# Patient Record
Sex: FEMALE | Race: WHITE | NOT HISPANIC OR LATINO | Employment: STUDENT | URBAN - METROPOLITAN AREA
[De-identification: names, ages, dates, MRNs, and addresses within clinical notes are randomized per-mention and may not be internally consistent; named-entity substitution may affect disease eponyms.]

---
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
## 2021-02-11 ENCOUNTER — TELEPHONE (OUTPATIENT)
Dept: FAMILY MEDICINE CLINIC | Facility: CLINIC | Age: 21
End: 2021-02-11

## 2021-02-12 ENCOUNTER — OFFICE VISIT (OUTPATIENT)
Dept: URGENT CARE | Age: 21
End: 2021-02-12
Payer: COMMERCIAL

## 2021-02-12 VITALS — OXYGEN SATURATION: 100 % | TEMPERATURE: 98.6 F | RESPIRATION RATE: 18 BRPM | HEART RATE: 76 BPM

## 2021-02-12 DIAGNOSIS — J02.9 SORE THROAT: ICD-10-CM

## 2021-02-12 DIAGNOSIS — B34.9 VIRAL SYNDROME: Primary | ICD-10-CM

## 2021-02-12 LAB — S PYO AG THROAT QL: NEGATIVE

## 2021-02-12 PROCEDURE — G0382 LEV 3 HOSP TYPE B ED VISIT: HCPCS | Performed by: PHYSICIAN ASSISTANT

## 2021-02-12 PROCEDURE — 87070 CULTURE OTHR SPECIMN AEROBIC: CPT | Performed by: PHYSICIAN ASSISTANT

## 2021-02-12 PROCEDURE — 87147 CULTURE TYPE IMMUNOLOGIC: CPT | Performed by: PHYSICIAN ASSISTANT

## 2021-02-12 PROCEDURE — U0005 INFEC AGEN DETEC AMPLI PROBE: HCPCS | Performed by: PHYSICIAN ASSISTANT

## 2021-02-12 PROCEDURE — U0003 INFECTIOUS AGENT DETECTION BY NUCLEIC ACID (DNA OR RNA); SEVERE ACUTE RESPIRATORY SYNDROME CORONAVIRUS 2 (SARS-COV-2) (CORONAVIRUS DISEASE [COVID-19]), AMPLIFIED PROBE TECHNIQUE, MAKING USE OF HIGH THROUGHPUT TECHNOLOGIES AS DESCRIBED BY CMS-2020-01-R: HCPCS | Performed by: PHYSICIAN ASSISTANT

## 2021-02-12 NOTE — PROGRESS NOTES
[facility]        NAME: Francheska Berg is a 24 y o  female  : 2000    MRN: [id number]  DATE: 2021  TIME: 3:58 PM    Assessment and Plan   Viral syndrome [B34 9]  1  Viral syndrome  Novel Coronavirus (Covid-19),PCR [facility] - Office Collection   2  Sore throat  POCT rapid strepA    Throat culture     Rapid strep negative, throat culture pending  COVID test pending    Patient Instructions     Rapid strep negative, throat culture pending  COVID swab collected today, test results pending  Check MyChart and call in 2-3 days for test results  Results may take up to 7-10 days  Quarantine guidelines discussed  OTC supplements/medications discussed  Follow-up with PCP in the next 1-2 days for re-evaluation  Go to the ED if any fevers, unable to stay hydrated, abdominal pain, chest pain, shortness of breath, wheezing, chest tightness, cough or cold symptoms, new or worsening symptoms or other concerning symptoms  Chief Complaint     Chief Complaint   Patient presents with    Cold Like Symptoms     sinus pressure and congestion, fatigue          History of Present Illness       50-year-old female presents with congestion, sinus pressure, postnasal drip, sore throat and mild generalized fatigue times 3-4 days  She has not tried anything for it  She denies any fevers, cough or other cold-like symptoms  She denies a loss of taste/smell  She denies any known COVID exposure but states she did travel to [place] and returned on Monday  She denies any chest pain, chest tightness, shortness of breath, GI/ symptoms or other complaints  Review of Systems   Review of Systems   Constitutional: Positive for fatigue  Negative for activity change, appetite change, chills and fever  HENT: Positive for congestion, postnasal drip, rhinorrhea, sinus pressure and sore throat  Negative for ear pain, facial swelling, trouble swallowing and voice change      Eyes: Negative for discharge, itching and visual disturbance  Respiratory: Negative for cough, chest tightness, shortness of breath and wheezing  Cardiovascular: Negative for chest pain  Gastrointestinal: Negative for abdominal pain, diarrhea, nausea and vomiting  Musculoskeletal: Negative for back pain and neck pain  Skin: Negative for rash  Neurological: Negative for dizziness, syncope, weakness, numbness and headaches  All other systems reviewed and are negative  Current Medications       Current Outpatient Medications:     ondansetron (ZOFRAN ODT) 4 mg disintegrating tablet, Take 1 tablet (4 mg total) by mouth every 8 (eight) hours as needed for nausea or vomiting for up to 5 days, Disp: 15 tablet, Rfl: 0    Current Allergies     Allergies as of 02/12/2021 - Reviewed 02/12/2021   Allergen Reaction Noted    Apple  08/02/2017    Other Other (See Comments) 02/25/2016    Prunus persica  08/02/2017            The following portions of the patient's history were reviewed and updated as appropriate: allergies, current medications, past family history, past medical history, past social history, past surgical history and problem list      History reviewed  No pertinent past medical history  History reviewed  No pertinent surgical history  History reviewed  No pertinent family history  Medications have been verified  Objective   Pulse 76   Temp 98 6 °F (37 °C)   Resp 18   LMP 02/01/2021   SpO2 100%        Physical Exam     Physical Exam  Vitals signs and nursing note reviewed  Constitutional:       General: She is not in acute distress  Appearance: She is well-developed  HENT:      Head: Normocephalic and atraumatic  Right Ear: Tympanic membrane normal       Left Ear: Tympanic membrane normal       Nose:      Right Sinus: No maxillary sinus tenderness or frontal sinus tenderness  Left Sinus: No maxillary sinus tenderness or frontal sinus tenderness        Mouth/Throat:      Mouth: Mucous membranes are moist       Pharynx: Oropharynx is clear  Uvula midline  Posterior oropharyngeal erythema present  No oropharyngeal exudate or uvula swelling  Eyes:      Pupils: Pupils are equal, round, and reactive to light  Neck:      Musculoskeletal: Normal range of motion and neck supple  Cardiovascular:      Rate and Rhythm: Normal rate and regular rhythm  Heart sounds: Normal heart sounds  Pulmonary:      Effort: Pulmonary effort is normal  No respiratory distress  Breath sounds: Normal breath sounds  No wheezing  Skin:     Capillary Refill: Capillary refill takes less than 2 seconds  Neurological:      Mental Status: She is alert and oriented to person, place, and time     Psychiatric:         Behavior: Behavior normal

## 2021-02-12 NOTE — PATIENT INSTRUCTIONS
Rapid strep negative, throat culture pending  COVID swab collected today, test results pending  Check MyChart and call in 2-3 days for test results  Results may take up to 7-10 days  Quarantine guidelines discussed  OTC supplements/medications discussed  Follow-up with PCP in the next 1-2 days for re-evaluation  Go to the ED if any fevers, unable to stay hydrated, abdominal pain, chest pain, shortness of breath, wheezing, chest tightness, cough or cold symptoms, new or worsening symptoms or other concerning symptoms  [address]    Your healthcare provider and/or public health staff have evaluated you and have determined that you do not need to remain in the hospital at this time  At this time you can be isolated at home where you will be monitored by staff from your local or state health department  You should carefully follow the prevention and isolation steps below until a healthcare provider or local or state health department says that you can return to your normal activities  Stay home except to get medical care    People who are mildly ill with COVID-19 are able to isolate at home during their illness  You should restrict activities outside your home, except for getting medical care  Do not go to work, school, or public areas  Avoid using public transportation, ride-sharing, or taxis  Separate yourself from other people and animals in your home    People: As much as possible, you should stay in a specific room and away from other people in your home  Also, you should use a separate bathroom, if available  Animals: You should restrict contact with pets and other animals while you are sick with COVID-19, just like you would around other people  Although there have not been reports of pets or other animals becoming sick with COVID-19, it is still recommended that people sick with COVID-19 limit contact with animals until more information is known about the virus   When possible, have another member of your household care for your animals while you are sick  If you are sick with COVID-19, avoid contact with your pet, including petting, snuggling, being kissed or licked, and sharing food  If you must care for your pet or be around animals while you are sick, wash your hands before and after you interact with pets and wear a facemask  See COVID-19 and Animals for more information  Call ahead before visiting your doctor    If you have a medical appointment, call the healthcare provider and tell them that you have or may have COVID-19  This will help the healthcare providers office take steps to keep other people from getting infected or exposed  Wear a facemask    You should wear a facemask when you are around other people (e g , sharing a room or vehicle) or pets and before you enter a healthcare providers office  If you are not able to wear a facemask (for example, because it causes trouble breathing), then people who live with you should not stay in the same room with you, or they should wear a facemask if they enter your room  Cover your coughs and sneezes    Cover your mouth and nose with a tissue when you cough or sneeze  Throw used tissues in a lined trash can  Immediately wash your hands with soap and water for at least 20 seconds or, if soap and water are not available, clean your hands with an alcohol-based hand  that contains at least 60% alcohol  Clean your hands often    Wash your hands often with soap and water for at least 20 seconds, especially after blowing your nose, coughing, or sneezing; going to the bathroom; and before eating or preparing food  If soap and water are not readily available, use an alcohol-based hand  with at least 60% alcohol, covering all surfaces of your hands and rubbing them together until they feel dry  Soap and water are the best option if hands are visibly dirty   Avoid touching your eyes, nose, and mouth with unwashed hands  Avoid sharing personal household items    You should not share dishes, drinking glasses, cups, eating utensils, towels, or bedding with other people or pets in your home  After using these items, they should be washed thoroughly with soap and water  Clean all high-touch surfaces everyday    High touch surfaces include counters, tabletops, doorknobs, bathroom fixtures, toilets, phones, keyboards, tablets, and bedside tables  Also, clean any surfaces that may have blood, stool, or body fluids on them  Use a household cleaning spray or wipe, according to the label instructions  Labels contain instructions for safe and effective use of the cleaning product including precautions you should take when applying the product, such as wearing gloves and making sure you have good ventilation during use of the product  Monitor your symptoms    Seek prompt medical attention if your illness is worsening (e g , difficulty breathing)  Before seeking care, call your healthcare provider and tell them that you have, or are being evaluated for, COVID-19  Put on a facemask before you enter the facility  These steps will help the healthcare providers office to keep other people in the office or waiting room from getting infected or exposed  Ask your healthcare provider to call the local or [facility] health department  Persons who are placed under active monitoring or facilitated self-monitoring should follow instructions provided by their local health department or occupational health professionals, as appropriate  If you have a medical emergency and need to call 911, notify the dispatch personnel that you have, or are being evaluated for COVID-19  If possible, put on a facemask before emergency medical services arrive      Discontinuing home isolation    Patients with confirmed COVID-19 should remain under home isolation precautions until the following conditions are met:   - They have had no fever for at least 24 hours (that is one full day of no fever without the use medicine that reduces fevers)  AND  - other symptoms have improved (for example, when their cough or shortness of breath have improved)  AND  - If had mild or moderate illness, at least 10 days have passed since their symptoms first appeared or if severe illness (needed oxygen) or immunosuppressed, at least 20 days have passed since symptoms first appeared  Patients with confirmed COVID-19 should also notify close contacts (including their workplace) and ask that they self-quarantine  Currently, close contact is defined as being within 6 feet for 15 minutes or more from the period 24 hours starting 48 hours before symptom onset to the time at which the patient went into isolation  Close contacts of patients diagnosed with COVID-19 should be instructed by the patient to self-quarantine for 14 days from the last time of their last contact with the patient       Source: [employer]

## 2021-02-14 LAB — SARS-COV-2 RNA RESP QL NAA+PROBE: NEGATIVE

## 2021-02-15 LAB — BACTERIA THROAT CULT: ABNORMAL

## 2021-02-17 ENCOUNTER — TELEPHONE (OUTPATIENT)
Dept: URGENT CARE | Age: 21
End: 2021-02-17

## 2021-02-17 DIAGNOSIS — J02.0 STREP THROAT: Primary | ICD-10-CM

## 2021-02-17 RX ORDER — AMOXICILLIN 500 MG/1
500 CAPSULE ORAL EVERY 12 HOURS SCHEDULED
Qty: 20 CAPSULE | Refills: 0 | Status: SHIPPED | OUTPATIENT
Start: 2021-02-17 | End: 2021-02-27

## 2021-02-17 NOTE — TELEPHONE ENCOUNTER
Called and discussed test results  Patient states feeling better but still slight sore throat  Quarantine reviewed  Answered all questions  Patient denies any allergies medications, denies taking any medications daily  Antibiotics sent over to pharmacy    Patient with follow-up with PCP or go to the ER sooner for any new, worsening or other concerning symptoms

## 2021-05-19 ENCOUNTER — OFFICE VISIT (OUTPATIENT)
Dept: FAMILY MEDICINE CLINIC | Facility: CLINIC | Age: 21
End: 2021-05-19
Payer: COMMERCIAL

## 2021-05-19 VITALS
SYSTOLIC BLOOD PRESSURE: 120 MMHG | TEMPERATURE: 97.7 F | WEIGHT: 165 LBS | HEIGHT: 65 IN | OXYGEN SATURATION: 98 % | BODY MASS INDEX: 27.49 KG/M2 | DIASTOLIC BLOOD PRESSURE: 70 MMHG | HEART RATE: 79 BPM

## 2021-05-19 DIAGNOSIS — G89.29 CHRONIC NECK PAIN: ICD-10-CM

## 2021-05-19 DIAGNOSIS — M54.2 CHRONIC NECK PAIN: ICD-10-CM

## 2021-05-19 DIAGNOSIS — Z00.00 ANNUAL PHYSICAL EXAM: Primary | ICD-10-CM

## 2021-05-19 PROCEDURE — 99385 PREV VISIT NEW AGE 18-39: CPT | Performed by: FAMILY MEDICINE

## 2021-05-19 RX ORDER — NORGESTREL-ETHINYL ESTRADIOL 0.3-0.03MG
TABLET ORAL
COMMUNITY
Start: 2021-03-06 | End: 2021-08-23 | Stop reason: SDUPTHER

## 2021-05-19 NOTE — PROGRESS NOTES
[facility]    NAME: Radha Jacobs  AGE: 22 y o  SEX: female  : 2000     DATE: 2021     Assessment and Plan:     Problem List Items Addressed This Visit     None      Visit Diagnoses     Annual physical exam    -  Primary    Uriel Holloway is healthy on exam   She is to continue a balanced, lower carb diet, and regular exercise  BMI 27 0-27 9,adult        Diet and exercise as above  Chronic neck pain        Pt desires to see PT - referral placed today  Relevant Orders    Ambulatory referral to Physical Therapy          Immunizations and preventive care screenings were discussed with patient today  Appropriate education was printed on patient's after visit summary  Counseling:  Dental Health: discussed importance of regular tooth brushing, flossing, and dental visits  · Exercise: the importance of regular exercise/physical activity was discussed  Recommend exercise 3-5 times per week for at least 30 minutes  BMI Counseling: Body mass index is 27 66 kg/m²  The BMI is above normal  Nutrition recommendations include moderation in carbohydrate intake  Exercise recommendations include exercising 3-5 times per week  No pharmacotherapy was ordered  Patient referred to PCP due to patient being overweight  Return in 1 year (on 2022) for Annual physical      Chief Complaint:     Chief Complaint   Patient presents with    New Patient Visit      History of Present Illness:     Adult Annual Physical   Patient here for a comprehensive physical exam  The patient reports no problems  New patient to the clinic today  Mother works for [employer] now  [place] [employer] [facility] [place] [address] [facility] in the  of  (Communications)  Exercises regularly  Had been seeing [facility]  Completed Pfizer COV-19 vaccine series last week  We discussed also the HPV9 vaccine series      Diet and Physical Activity  · Diet/Nutrition: well balanced diet  · Exercise: 5-7 times a week on average  Depression Screening  PHQ-9 Depression Screening    PHQ-9:   Frequency of the following problems over the past two weeks:      Little interest or pleasure in doing things: 0 - not at all  Feeling down, depressed, or hopeless: 0 - not at all  PHQ-2 Score: 0       General Health  · Sleep: sleeps well  · Hearing: normal - bilateral   · Vision: no vision problems  · Dental: regular dental visits  /GYN Health  · Last menstrual period: Regular menses  · Contraceptive method: oral contraceptives  · History of STDs?: no      Review of Systems:     Review of Systems   Constitutional: Negative for activity change  Respiratory: Negative for shortness of breath  Cardiovascular: Negative for chest pain  Xiphoid region soreness - with lifting  Gastrointestinal: Negative for abdominal pain  Genitourinary: Negative for menstrual problem  No new breast lumps on self-examination  Hx of endometriosis  Musculoskeletal: Positive for neck pain  Chronic, intermittent neck pain, with hx of concussions / "whip lash" injuries playing soccer/lacrosse in HS  Neurological:        Hx of concussions in past    Psychiatric/Behavioral: Negative for dysphoric mood  The patient is not nervous/anxious  Past Medical History:     No past medical history on file  Past Surgical History:     No past surgical history on file     Social History:        Social History     Socioeconomic History    Marital status: Single     Spouse name: None    Number of children: None    Years of education: None    Highest education level: None   Occupational History    None   Social Needs    Financial resource strain: None    Food insecurity     Worry: None     Inability: None    Transportation needs     Medical: None     Non-medical: None   Tobacco Use    Smoking status: Never Smoker    Smokeless tobacco: Never Used   Substance and Sexual Activity    Alcohol use: No    Drug use: Yes     Types: Marijuana    Sexual activity: Not Currently   Lifestyle    Physical activity     Days per week: None     Minutes per session: None    Stress: None   Relationships    Social connections     Talks on phone: None     Gets together: None     Attends Scientologist service: None     Active member of club or organization: None     Attends meetings of clubs or organizations: None     Relationship status: None    Intimate partner violence     Fear of current or ex partner: None     Emotionally abused: None     Physically abused: None     Forced sexual activity: None   Other Topics Concern    None   Social History Narrative    None      Family History:     No family history on file  Current Medications:     Current Outpatient Medications   Medication Sig Dispense Refill    Cryselle-28 0 3-30 MG-MCG per tablet        No current facility-administered medications for this visit  Allergies: Allergies   Allergen Reactions    Apple - Food Allergy     Other Other (See Comments)     Per patient, itchy eyes, stuffy nose & sneezing   Prunus Persica       Physical Exam:     /70 (BP Location: Left arm)   Pulse 79   Temp 97 7 °F (36 5 °C)   Ht 5' 4 76" (1 645 m)   Wt 74 8 kg (165 lb)   SpO2 98%   BMI 27 66 kg/m²     Physical Exam  Vitals signs and nursing note reviewed  Constitutional:       General: She is not in acute distress  Appearance: Normal appearance  She is not ill-appearing, toxic-appearing or diaphoretic  HENT:      Head: Normocephalic and atraumatic  Eyes:      General: No scleral icterus  Conjunctiva/sclera: Conjunctivae normal    Neck:      Musculoskeletal: Normal range of motion and neck supple  No neck rigidity or muscular tenderness  Cardiovascular:      Rate and Rhythm: Normal rate and regular rhythm  Heart sounds: Normal heart sounds  No murmur  No friction rub  No gallop  Pulmonary:      Effort: Pulmonary effort is normal  No respiratory distress  Breath sounds: Normal breath sounds  No stridor  No wheezing, rhonchi or rales  Abdominal:      General: Abdomen is flat  Bowel sounds are normal  There is no distension  Palpations: Abdomen is soft  There is no mass  Tenderness: There is no abdominal tenderness  There is no guarding or rebound  Lymphadenopathy:      Cervical: No cervical adenopathy  Neurological:      Mental Status: She is alert and oriented to person, place, and time  Psychiatric:         Mood and Affect: Mood normal          Behavior: Behavior normal          Thought Content: Thought content normal          Judgment: Judgment normal       Ama Wolfe was seen today for new patient visit  Diagnoses and all orders for this visit:    Annual physical exam  Comments:  Ama Wolfe is healthy on exam   She is to continue a balanced, lower carb diet, and regular exercise  BMI 27 0-27 9,adult  Comments:  Diet and exercise as above  Chronic neck pain  Comments:  Pt desires to see PT - referral placed today  Orders:  -     Ambulatory referral to Physical Therapy;  Future          Wojcie[address]iega DO   [facility]

## 2021-05-19 NOTE — PATIENT INSTRUCTIONS

## 2021-05-20 ENCOUNTER — OFFICE VISIT (OUTPATIENT)
Dept: OBGYN CLINIC | Facility: CLINIC | Age: 21
End: 2021-05-20
Payer: COMMERCIAL

## 2021-05-20 VITALS
BODY MASS INDEX: 27.69 KG/M2 | HEIGHT: 65 IN | WEIGHT: 166.2 LBS | SYSTOLIC BLOOD PRESSURE: 118 MMHG | DIASTOLIC BLOOD PRESSURE: 68 MMHG

## 2021-05-20 DIAGNOSIS — R87.810 ASCUS WITH POSITIVE HIGH RISK HPV CERVICAL: ICD-10-CM

## 2021-05-20 DIAGNOSIS — R87.610 ASCUS WITH POSITIVE HIGH RISK HPV CERVICAL: ICD-10-CM

## 2021-05-20 DIAGNOSIS — N71.1 CHRONIC ENDOMETRITIS: Primary | ICD-10-CM

## 2021-05-20 PROCEDURE — 99203 OFFICE O/P NEW LOW 30 MIN: CPT | Performed by: OBSTETRICS & GYNECOLOGY

## 2021-05-21 PROBLEM — R87.610 ASCUS WITH POSITIVE HIGH RISK HPV CERVICAL: Status: ACTIVE | Noted: 2021-05-21

## 2021-05-21 PROBLEM — R87.810 ASCUS WITH POSITIVE HIGH RISK HPV CERVICAL: Status: ACTIVE | Noted: 2021-05-21

## 2021-05-21 NOTE — PROGRESS NOTES
Assessment/Plan:   Diagnoses and all orders for this visit:    Chronic endometritis  - reviewed evaluation and previous treatment with patient  Significant improvement after treatment with doxycycline  - monitor for progressive and worsening postcoital bleeding  Consider using prophylactic anti-inflammatory if intercourse performed during those days preceding menses  ASCUS with positive high risk HPV cervical    - reviewed result with patient and recommendation to follow up with pap smear at her annual gyn exam this year  Discussed that pap is typically delayed until age 19yo however in light of her bleeding symptoms last year, the pap was performed to rule out any contribution  - reviewed HPV positive result and common infection found in women in early 19's  Recommended Guardasil vaccination against HPV and explained 3 part series  Patient very interested  - will schedule HPV vaccination in 1 week as patient recently received COVID vaccination last week  Subjective:    Patient ID: Armida Cardenas is a 22 y o  female  Chief Complaint   Patient presents with   [address] pt    Consult     Pt was dx w/ endometriosis  Would like a second opinion  Pt will RTO for hpv vaccine, recevied 2nd covid shot 5/10  Adrianne Valenzuela is a [place] [id number] presenting today as a new patient for second opinion regarding her diagnoses of chronic endometritis and further discussion of her abnormal pap smear and HPV positive results  She was evaluated by [facility] for postcoital bleeding last summer (August 2020)  Due to persistent post-coital bleeding, which patient described as a short menses and heavier than spotting, a pap smear was performed and she was treated with doxycycline for 10 days  She did have a recurrent episode following in November 2020 which was evaluated with EMB and US and tissue biopsy confirmed chronic endometritis       Adrianne Valenzuela states that her post-coital bleeding has improved significantly since her course of doxycycline  She states that she may have some bleeding but only on the 1-2 days preceding her menses each month  She denies dyspareunia, foul odor, and discharge  She is currently taking combined OCPs for contraception and satisfied with her method  She reports some anxiety related to her positive HPV result and wants to discuss recommendations for further management  The following portions of the patient's history were reviewed and updated as appropriate: allergies, current medications, past family history, past medical history, past social history, past surgical history and problem list     Review of Systems   Constitutional: Negative for activity change, appetite change and unexpected weight change  Respiratory: Negative for cough and shortness of breath  Gastrointestinal: Negative for abdominal pain, constipation, diarrhea, nausea and vomiting  Genitourinary: Negative for difficulty urinating, dyspareunia, frequency, menstrual problem, pelvic pain, urgency, vaginal bleeding, vaginal discharge and vaginal pain  Musculoskeletal: Negative for back pain  Skin: Negative  Neurological: Negative for dizziness, weakness, light-headedness and headaches  Psychiatric/Behavioral: Negative  Objective:  /68 (BP Location: Right arm, Patient Position: Sitting, Cuff Size: Standard)   Ht 5' 5" (1 651 m)   Wt 75 4 kg (166 lb 3 2 oz)   LMP 04/29/2021   BMI 27 66 kg/m²   Physical Exam  Constitutional:       General: She is not in acute distress  Appearance: Normal appearance  She is normal weight  She is not diaphoretic  HENT:      Head: Normocephalic and atraumatic  Pulmonary:      Effort: Pulmonary effort is normal  No respiratory distress  Neurological:      Mental Status: She is alert and oriented to person, place, and time  Psychiatric:         Mood and Affect: Mood normal          Behavior: Behavior normal          Thought Content:  Thought content normal          Judgment: Judgment normal    Vitals signs and nursing note reviewed

## 2021-06-08 ENCOUNTER — CLINICAL SUPPORT (OUTPATIENT)
Dept: OBGYN CLINIC | Facility: CLINIC | Age: 21
End: 2021-06-08
Payer: COMMERCIAL

## 2021-06-08 DIAGNOSIS — Z23 NEED FOR HPV VACCINATION: Primary | ICD-10-CM

## 2021-06-08 PROCEDURE — 90471 IMMUNIZATION ADMIN: CPT | Performed by: OBSTETRICS & GYNECOLOGY

## 2021-06-08 PROCEDURE — 96372 THER/PROPH/DIAG INJ SC/IM: CPT | Performed by: OBSTETRICS & GYNECOLOGY

## 2021-06-08 PROCEDURE — 90651 9VHPV VACCINE 2/3 DOSE IM: CPT | Performed by: OBSTETRICS & GYNECOLOGY

## 2021-06-08 NOTE — PROGRESS NOTES
Pt  Presenting for 1st Gardasil dose  Given in RIGHT deltoid without difficulty  Pt  Can return in 1-2 months for next dose

## 2021-07-07 ENCOUNTER — EVALUATION (OUTPATIENT)
Dept: PHYSICAL THERAPY | Facility: CLINIC | Age: 21
End: 2021-07-07
Payer: COMMERCIAL

## 2021-07-07 DIAGNOSIS — M54.2 CHRONIC NECK PAIN: Primary | ICD-10-CM

## 2021-07-07 DIAGNOSIS — G89.29 CHRONIC NECK PAIN: Primary | ICD-10-CM

## 2021-07-07 PROCEDURE — 97110 THERAPEUTIC EXERCISES: CPT | Performed by: PHYSICAL THERAPIST

## 2021-07-07 PROCEDURE — 97162 PT EVAL MOD COMPLEX 30 MIN: CPT | Performed by: PHYSICAL THERAPIST

## 2021-07-07 NOTE — PROGRESS NOTES
PT Evaluation     Today's date: 2021  Patient name: Frankie Bernal  : 2000  MRN: [id number]  Referring provider: Chava Hunt DO  Dx:   Encounter Diagnosis     ICD-10-CM    1  Chronic neck pain  M54 2 Ambulatory referral to Physical Therapy    G89 29     Pt desires to see PT - referral placed today  Assessment  Assessment details: Patient presents with signs and symptoms consistent with referring diagnosis  She presents with mechanical neck pain with presentation consistent with posterior derangement  She also has DNF weakness and neural tension B  Positive prognostic indicators include: Motivated to improve  Negative prognostic indicators include: :Long standing symptoms, may not be in area due ot work/schoo-l- may need to transfer care to another facility  She presents with: pain, decreased: ROM, strength and  functional capacity  She requires skilled PT to address these deficits and restore maximal functional capacity  Thank you for this pleasant referral        Impairments: abnormal or restricted ROM, activity intolerance, impaired physical strength, lacks appropriate home exercise program and pain with function  Understanding of Dx/Px/POC: good   Prognosis: good    Goals  ST-6 weeks  1  Patient to be independent with HEP  2   Decrease pain at least 2 subjective levels  LT-12 weeks  1    Patient to voice comfort with self management of condition  2   75% or > decreased pain  3   75% or > decreased functional deficits  4   Normalize AROM of all deficit planes  5   Normalize strength  7   Patient to voice understanding of activities/positions to avoid  8   Centralize symptoms        Plan  Patient would benefit from: skilled PT  Referral necessary: No  Planned modality interventions: cryotherapy  Planned therapy interventions: IADL retraining, joint mobilization, manual therapy, motor coordination training, neuromuscular re-education, patient education, postural training, self care, strengthening, stretching, therapeutic activities, therapeutic exercise, home exercise program, flexibility, ADL training, balance and body mechanics training  Frequency: 2x week  Duration in weeks: 6  Treatment plan discussed with: patient        Subjective Evaluation    History of Present Illness  Onset date: 4 years  Mechanism of injury: Chief Complaint: Neck Pain    History:  Patient has chronic history of neck pain over the past 4 years which began with a series of concussions including being it with a lacrosse ball 2x and falling on her head in soccer  Past treatment has consisted of physical therapy (last time was 2 years ago)  She notes in general her symptoms are worsening  She works in [email or web address] as well as [employer]  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        PMH:  Fasciotomy shin     Aggravating factors:  Sitting upright, lifting weights, cerviial flexion (writing, phone work), driving    Relieving factors:  Stretching, theraband exercises    Functional Deficits: Limited lifting frequency and intensity (upper body especially), cell phone use, writng    Patient Goals: "Be able to freely do whatever I want without this feeling"  "More flexibility of neck movement"    Quality of life: good    Pain  At best pain ratin  At worst pain rating: 3  Location: entire neck region and anterior shoulders; at times into  L > R UE- elbow tightness adn hand paresthesias          Objective     Postural Observations  Standing posture: fair        Active Range of Motion   Cervical/Thoracic Spine       Cervical    Subcranial retraction:   Restriction level: moderate  Flexion:  WFL  Extension:  with pain Restriction level: moderate  Left lateral flexion:  Restriction level: minimal  Right lateral flexion:  Restriction level minimal  Left rotation:  with pain Restriction level: minimal  Right rotation:  with pain Restriction level: moderate    Joint Play     Hypomobile: C2, C3, C4, C5, C6, C7 and T1   Mechanical Assessment    Cervical    Seated Flexion:  repeated movements  Pain intensity: worse  Pain level: increased  Seated Extension: repeated movements  Pain location: no change  Supine retractation: repeated movements  Pain level: increased    Thoracic      Lumbar      General Comments:      Cervical/Thoracic Comments  (-) Compression  (-) Distraction  (+) ULTT: B Median, Ulnar Nerve Bias; (+) R only Radial bias  Remainder UQS WFL               Precautions: (-)      Manuals 7/7            Retraction Mobilization             Nerve Glides B M, U; R Radial                                       Neuro Re-Ed             Nerve Glides: B Median, Ulnar             Nerve Glides R Radial                                                                              Ther Ex             HEP 10'            Scap Retraction             Cx Retractoin             Cx Retraction pt OP             DNF Hold                                                    Ther Activity                                       Gait Training                                       Modalities

## 2021-07-14 ENCOUNTER — APPOINTMENT (OUTPATIENT)
Dept: PHYSICAL THERAPY | Facility: CLINIC | Age: 21
End: 2021-07-14
Payer: COMMERCIAL

## 2021-07-14 ENCOUNTER — TELEPHONE (OUTPATIENT)
Dept: FAMILY MEDICINE CLINIC | Facility: CLINIC | Age: 21
End: 2021-07-14

## 2021-07-14 NOTE — TELEPHONE ENCOUNTER
Pt mother calls in, patient s fully vaccinated but is having a runny nose and a fever yesterday  She would like to be tested for covid for peace of mind     Needs COVID test ordered to get done at [facility]

## 2021-07-27 ENCOUNTER — CLINICAL SUPPORT (OUTPATIENT)
Dept: OBGYN CLINIC | Facility: CLINIC | Age: 21
End: 2021-07-27
Payer: COMMERCIAL

## 2021-07-27 VITALS
BODY MASS INDEX: 27.39 KG/M2 | SYSTOLIC BLOOD PRESSURE: 118 MMHG | DIASTOLIC BLOOD PRESSURE: 68 MMHG | HEIGHT: 65 IN | WEIGHT: 164.4 LBS

## 2021-07-27 DIAGNOSIS — Z23 NEED FOR HPV VACCINE: Primary | ICD-10-CM

## 2021-07-27 PROCEDURE — 90651 9VHPV VACCINE 2/3 DOSE IM: CPT | Performed by: OBSTETRICS & GYNECOLOGY

## 2021-07-27 PROCEDURE — 90471 IMMUNIZATION ADMIN: CPT | Performed by: OBSTETRICS & GYNECOLOGY

## 2021-08-03 ENCOUNTER — EVALUATION (OUTPATIENT)
Dept: PHYSICAL THERAPY | Facility: CLINIC | Age: 21
End: 2021-08-03
Payer: COMMERCIAL

## 2021-08-03 DIAGNOSIS — M54.2 CHRONIC NECK PAIN: Primary | ICD-10-CM

## 2021-08-03 DIAGNOSIS — G89.29 CHRONIC NECK PAIN: Primary | ICD-10-CM

## 2021-08-03 PROCEDURE — 97140 MANUAL THERAPY 1/> REGIONS: CPT | Performed by: PHYSICAL THERAPIST

## 2021-08-03 PROCEDURE — 97110 THERAPEUTIC EXERCISES: CPT | Performed by: PHYSICAL THERAPIST

## 2021-08-03 NOTE — PROGRESS NOTES
Daily Note     Today's date: 8/3/2021  Patient name: Mahsa Logan  : 2000  MRN: [id number]  Referring provider: Barber Hollis DO  Dx:   Encounter Diagnosis     ICD-10-CM    1  Chronic neck pain  M54 2     G89 29                   Subjective: Pt notes no significant change in neck symptoms; L UE paresthesias have been worse      Objective: See treatment diary below  Assessment: Tolerated treatment well  Patient has (+) neural tension on L and active L cervical posterior derangement  DId not tolerate seated or supine retraction, full pain free ROM with retraction/extension with distraction       Plan: Continue per plan of care        Precautions: (-)      Manuals 7/7 8/3           Retraction Mobilization  5'           Nerve Glides B M, U; R Radial             Ret with Ext Mob  5'                        Neuro Re-Ed             Nerve Glides: B Median, Ulnar  Pt ed HEP Median           Nerve Glides R Radial                                                                              Ther Ex             HEP 10'            Scap Retraction  :05 20           Cx Retractoin  20x           Cx Retraction pt OP  20x           DNF Hold             Cx Retraction supine  10x           Supine Ret with Ext with towel   10x                        Ther Activity                                       Gait Training                                       Modalities

## 2021-08-10 ENCOUNTER — OFFICE VISIT (OUTPATIENT)
Dept: PHYSICAL THERAPY | Facility: CLINIC | Age: 21
End: 2021-08-10
Payer: COMMERCIAL

## 2021-08-10 DIAGNOSIS — G89.29 CHRONIC NECK PAIN: Primary | ICD-10-CM

## 2021-08-10 DIAGNOSIS — M54.2 CHRONIC NECK PAIN: Primary | ICD-10-CM

## 2021-08-10 PROCEDURE — 97140 MANUAL THERAPY 1/> REGIONS: CPT | Performed by: PHYSICAL THERAPIST

## 2021-08-10 PROCEDURE — 97110 THERAPEUTIC EXERCISES: CPT | Performed by: PHYSICAL THERAPIST

## 2021-08-10 NOTE — PROGRESS NOTES
PT ReEvaluation and Discharge    Today's date: 8/10/2021  Patient name: Daria Ryan  : 2000  MRN: [id number]  Referring provider: Abelino Mancuso DO  Dx:   Encounter Diagnosis     ICD-10-CM    1  Chronic neck pain  M54 2     G89 29                   Assessment  Assessment details: Patient has been compliant with attending PT and home exercise program since initial eval   She has a clear directional preference of cervical extension but requires a few more sessions to fully resolve symptoms and educate in self management fully  She will only be in the area before moving to college 2 more weeks  Plan to transition to HEP at that time  Thank you for this pleasant referral       Impairments: abnormal or restricted ROM, activity intolerance, impaired physical strength and pain with function  Understanding of Dx/Px/POC: good   Prognosis: good    Goals  ST-6 weeks  1  Patient to be independent with HEP - Met  2  Decrease pain at least 2 subjective levels  - Met     LT-12 weeks  1    Patient to voice comfort with self management of condition - Partially Met  2   75% or > decreased pain  - Partially Met  3   75% or > decreased functional deficits  - Partially Met  4  Normalize AROM of all deficit planes  - Partially Met  7  Patient to voice understanding of activities/positions to avoid  - Partially Met  8    Centralize symptoms - Partially Met      Plan  Patient would benefit from: skilled PT  Referral necessary: No  Planned modality interventions: cryotherapy  Planned therapy interventions: IADL retraining, joint mobilization, manual therapy, motor coordination training, neuromuscular re-education, patient education, postural training, self care, strengthening, stretching, therapeutic activities, therapeutic exercise, home exercise program, flexibility, ADL training, balance and body mechanics training  Frequency: 2x week  Duration in weeks: 6  Treatment plan discussed with: patient        Subjective Evaluation    History of Present Illness  Onset date: 4 years  Mechanism of injury: Chief Complaint: Neck Pain    Patient notes good improvement (50-60% overall) since beginning therapy despite only 2 therapy session in the past month  She notes much less, though not yet fully resolved, hand paresthesias and overall more days with less neck pain  She still has provoked neck/shoulder tightness after heavier squatting at the gym       Patient Goals: "Be able to freely do whatever I want without this feeling"  "More flexibility of neck movement"    Quality of life: good    Pain  At best pain ratin  At worst pain rating: 3  Location: entire neck region and anterior shoulders; at times into  L > R UE- elbow tightness adn hand paresthesias          Objective     Postural Observations  Standing posture: fair        Active Range of Motion   Cervical/Thoracic Spine       Cervical    Subcranial retraction:   Restriction level: minimal  Flexion:  WFL  Extension:  with pain Restriction level: minimal  Left lateral flexion:  Restriction level: minimal  Right lateral flexion:  Restriction level minimal  Left rotation:  with pain Restriction level: minimal  Right rotation:  Restriction level: minimal    Joint Play     Hypomobile: C2, C3, C4, C5, C6, C7 and T1   Mechanical Assessment    Cervical    Seated Flexion:  repeated movements  Pain intensity: worse  Pain level: increased  Seated Extension: repeated movements  Pain location: no change  Supine retractation: repeated movements  Pain level: increased    Thoracic      Lumbar      General Comments:      Cervical/Thoracic Comments  (-) Compression  (-) Distraction  (+) ULTT: B Median Nerve Bias (mild B)  Remainder UQS WFL               Precautions: (-)         Manuals 7/7 8/3 810          Retraction Mobilization  5' 5          Nerve Glides B M, U; R Radial   5          Ret with Ext Mob  5' 5                       Neuro Re-Ed             Nerve Glides: B Median, Ulnar  Pt ed HEP Median Pt ed/review          Nerve Glides R Radial                                                                              Ther Ex             HEP 10'            Scap Retraction  :05 20           Cx Retractoin  20x 20x          Cx Retraction pt OP  20x 20x          DNF Hold             Cx Retraction supine  10x 20x          Supine Ret with Ext with towel   10x review          Seated Ret with Ext   20          Seated Ret pt OP   20                                                                           Ther Activity                                       Gait Training                                       Modalities

## 2021-08-18 ENCOUNTER — APPOINTMENT (OUTPATIENT)
Dept: PHYSICAL THERAPY | Facility: CLINIC | Age: 21
End: 2021-08-18
Payer: COMMERCIAL

## 2021-08-23 ENCOUNTER — TELEPHONE (OUTPATIENT)
Dept: FAMILY MEDICINE CLINIC | Facility: CLINIC | Age: 21
End: 2021-08-23

## 2021-08-23 DIAGNOSIS — N71.1 CHRONIC ENDOMETRITIS: Primary | ICD-10-CM

## 2021-08-23 RX ORDER — NORGESTREL-ETHINYL ESTRADIOL 0.3-0.03MG
1 TABLET ORAL DAILY
Qty: 90 TABLET | Refills: 3 | Status: SHIPPED | OUTPATIENT
Start: 2021-08-23 | End: 2022-06-20 | Stop reason: ALTCHOICE

## 2021-08-23 NOTE — TELEPHONE ENCOUNTER
Medication: Cryselle-28 0 3-30 MG-MCG per tablet  Dosage:  How Often:  Not on order  Quantity:  30  Last Office Visit: 5/19/2021  Next Office Visit:5/24/2021  Last refilled: unsure  How many pills left:0  Pharmacy:   [address] [place] [address]) - [facility] - [address]  [address] [address]  Phone: [phone] Fax: [phone]

## 2021-10-18 ENCOUNTER — OFFICE VISIT (OUTPATIENT)
Dept: FAMILY MEDICINE CLINIC | Facility: CLINIC | Age: 21
End: 2021-10-18
Payer: COMMERCIAL

## 2021-10-18 VITALS
SYSTOLIC BLOOD PRESSURE: 108 MMHG | WEIGHT: 164.8 LBS | TEMPERATURE: 97.7 F | HEART RATE: 96 BPM | HEIGHT: 65 IN | BODY MASS INDEX: 27.46 KG/M2 | OXYGEN SATURATION: 96 % | DIASTOLIC BLOOD PRESSURE: 70 MMHG | RESPIRATION RATE: 16 BRPM

## 2021-10-18 DIAGNOSIS — G89.21 CHRONIC PAIN DUE TO TRAUMA: ICD-10-CM

## 2021-10-18 DIAGNOSIS — Z87.820 PERSONAL HISTORY OF MULTIPLE CONCUSSIONS: ICD-10-CM

## 2021-10-18 DIAGNOSIS — M79.10 MYALGIA: Primary | ICD-10-CM

## 2021-10-18 PROCEDURE — 99214 OFFICE O/P EST MOD 30 MIN: CPT | Performed by: FAMILY MEDICINE

## 2021-10-18 RX ORDER — IBUPROFEN 200 MG
400 TABLET ORAL EVERY 6 HOURS
COMMUNITY

## 2021-10-18 RX ORDER — CYCLOBENZAPRINE HCL 10 MG
10 TABLET ORAL 3 TIMES DAILY PRN
Qty: 90 TABLET | Refills: 0 | Status: SHIPPED | OUTPATIENT
Start: 2021-10-18 | End: 2022-06-20 | Stop reason: ALTCHOICE

## 2021-11-30 ENCOUNTER — CONSULT (OUTPATIENT)
Dept: PAIN MEDICINE | Facility: CLINIC | Age: 21
End: 2021-11-30
Payer: COMMERCIAL

## 2021-11-30 VITALS
SYSTOLIC BLOOD PRESSURE: 127 MMHG | WEIGHT: 163 LBS | HEART RATE: 105 BPM | DIASTOLIC BLOOD PRESSURE: 84 MMHG | BODY MASS INDEX: 27.12 KG/M2

## 2021-11-30 DIAGNOSIS — G89.29 CHRONIC GENERALIZED PAIN: Primary | ICD-10-CM

## 2021-11-30 DIAGNOSIS — M79.10 MYALGIA: ICD-10-CM

## 2021-11-30 DIAGNOSIS — R52 CHRONIC GENERALIZED PAIN: Primary | ICD-10-CM

## 2021-11-30 DIAGNOSIS — G89.21 CHRONIC PAIN DUE TO TRAUMA: ICD-10-CM

## 2021-11-30 PROCEDURE — 99244 OFF/OP CNSLTJ NEW/EST MOD 40: CPT | Performed by: PHYSICAL MEDICINE & REHABILITATION

## 2021-11-30 RX ORDER — GABAPENTIN 300 MG/1
300 CAPSULE ORAL 3 TIMES DAILY
Qty: 90 CAPSULE | Refills: 1 | Status: SHIPPED | OUTPATIENT
Start: 2021-11-30 | End: 2022-06-20 | Stop reason: ALTCHOICE

## 2021-12-20 ENCOUNTER — CLINICAL SUPPORT (OUTPATIENT)
Dept: OBGYN CLINIC | Facility: CLINIC | Age: 21
End: 2021-12-20
Payer: COMMERCIAL

## 2021-12-20 VITALS — DIASTOLIC BLOOD PRESSURE: 68 MMHG | SYSTOLIC BLOOD PRESSURE: 104 MMHG | WEIGHT: 167.8 LBS | BODY MASS INDEX: 27.92 KG/M2

## 2021-12-20 DIAGNOSIS — Z23 NEED FOR HPV VACCINATION: Primary | ICD-10-CM

## 2021-12-20 PROCEDURE — 90471 IMMUNIZATION ADMIN: CPT

## 2021-12-20 PROCEDURE — 90651 9VHPV VACCINE 2/3 DOSE IM: CPT

## 2022-01-05 ENCOUNTER — LAB (OUTPATIENT)
Dept: LAB | Facility: AMBULARY SURGERY CENTER | Age: 22
End: 2022-01-05
Payer: COMMERCIAL

## 2022-01-05 ENCOUNTER — OFFICE VISIT (OUTPATIENT)
Dept: OBGYN CLINIC | Facility: CLINIC | Age: 22
End: 2022-01-05
Payer: COMMERCIAL

## 2022-01-05 VITALS — BODY MASS INDEX: 26.96 KG/M2 | DIASTOLIC BLOOD PRESSURE: 70 MMHG | WEIGHT: 162 LBS | SYSTOLIC BLOOD PRESSURE: 118 MMHG

## 2022-01-05 DIAGNOSIS — Z11.3 SCREENING FOR STD (SEXUALLY TRANSMITTED DISEASE): ICD-10-CM

## 2022-01-05 DIAGNOSIS — B37.3 VAGINAL CANDIDIASIS: Primary | ICD-10-CM

## 2022-01-05 PROBLEM — N89.8 VAGINAL IRRITATION: Status: ACTIVE | Noted: 2022-01-05

## 2022-01-05 PROCEDURE — 87340 HEPATITIS B SURFACE AG IA: CPT

## 2022-01-05 PROCEDURE — 87591 N.GONORRHOEAE DNA AMP PROB: CPT | Performed by: OBSTETRICS & GYNECOLOGY

## 2022-01-05 PROCEDURE — 99214 OFFICE O/P EST MOD 30 MIN: CPT | Performed by: OBSTETRICS & GYNECOLOGY

## 2022-01-05 PROCEDURE — 86803 HEPATITIS C AB TEST: CPT

## 2022-01-05 PROCEDURE — 36415 COLL VENOUS BLD VENIPUNCTURE: CPT

## 2022-01-05 PROCEDURE — 87491 CHLMYD TRACH DNA AMP PROBE: CPT | Performed by: OBSTETRICS & GYNECOLOGY

## 2022-01-05 PROCEDURE — 87389 HIV-1 AG W/HIV-1&-2 AB AG IA: CPT

## 2022-01-05 PROCEDURE — 86592 SYPHILIS TEST NON-TREP QUAL: CPT

## 2022-01-05 RX ORDER — FLUCONAZOLE 150 MG/1
150 TABLET ORAL ONCE
Qty: 1 TABLET | Refills: 1 | Status: SHIPPED | OUTPATIENT
Start: 2022-01-05 | End: 2022-01-05

## 2022-01-05 NOTE — PROGRESS NOTES
Assessment/Plan    Diagnoses and all orders for this visit:    Vaginal candidiasis        -POCT wet mount/ KOH- c/w yeast  -     fluconazole (DIFLUCAN) 150 mg tablet; Take 1 tablet (150 mg total) by mouth once for 1 dose  -pt will c/w probiotics    Screening for STD (sexually transmitted disease)  -     Chlamydia/GC amplified DNA by PCR  -     HIV 1/2 ANTIGEN/ANTIBODY (4TH GENERATION) W REFLEX [facility]; Future  -     Hepatitis C antibody; Future  -     Hepatitis B surface antigen; Future  -     RPR; Future              Subjective     Liv Robbins is a 22 y o  female here for a problem visit  Patient is complaining of vaginal irritation, and feeling "raw" for about 2 weeks  Pt reports a hx of recurrent vulvo-vaginal candidiasis last year  Has been better since taking probiotics  Denies dyspareunia or pelvic pain  Denies any noticeable discharge or irregular bleeding  Was having some issues remembering to take ocp, but has been consistent the past several weeks  Due to start menses tomorrow  Monogamous without condom use  Patient Active Problem List   Diagnosis    ASCUS with positive high risk HPV cervical    Chronic pain due to trauma    Myalgia    Vaginal irritation         Gynecologic History  Patient's last menstrual period was 2021 (approximate)    Contraception: OCP (estrogen/progesterone)    Obstetric History  OB History    Para Term  AB Living   0 0 0 0 0 0   SAB IAB Ectopic Multiple Live Births   0 0 0 0 0   Obstetric Comments   Menarche: 16         Past Medical History:   Diagnosis Date    Endometriosis        Past Surgical History:   Procedure Laterality Date    LEG TENDON SURGERY      WISDOM TOOTH EXTRACTION           Family History   Problem Relation Age of Onset    No Known Problems Mother     No Known Problems Father        Social History     Socioeconomic History    Marital status: Single     Spouse name: Not on file    Number of children: Not on file  Years of education: Not on file    Highest education level: Not on file   Occupational History    Not on file   Tobacco Use    Smoking status: Never Smoker    Smokeless tobacco: Never Used   Vaping Use    Vaping Use: Never used   Substance and Sexual Activity    Alcohol use: Not Currently     Comment: social     Drug use: Yes     Types: Marijuana     Comment: social     Sexual activity: Yes     Partners: Male     Birth control/protection: OCP   Other Topics Concern    Not on file   Social History Narrative    Not on file     Social Determinants of Health     Financial Resource Strain: Not on file   Food Insecurity: Not on file   Transportation Needs: Not on file   Physical Activity: Not on file   Stress: Not on file   Social Connections: Not on file   Intimate Partner Violence: Not on file   Housing Stability: Not on file       Allergies  Apple - food allergy, Other, and Prunus persica    Medications    Current Outpatient Medications:     Cryselle-28 0 3-30 MG-MCG per tablet, Take 1 tablet by mouth daily, Disp: 90 tablet, Rfl: 3    cyclobenzaprine (FLEXERIL) 10 mg tablet, Take 1 tablet (10 mg total) by mouth 3 (three) times a day as needed for muscle spasms, Disp: 90 tablet, Rfl: 0    gabapentin (NEURONTIN) 300 mg capsule, Take 1 capsule (300 mg total) by mouth 3 (three) times a day, Disp: 90 capsule, Rfl: 1    ibuprofen (MOTRIN) 200 mg tablet, Take 400 mg by mouth every 6 (six) hours Take RTC for 1-2 weeks, Disp: , Rfl:     fluconazole (DIFLUCAN) 150 mg tablet, Take 1 tablet (150 mg total) by mouth once for 1 dose, Disp: 1 tablet, Rfl: 1      Review of Systems  Review of Systems   Constitutional: Negative for activity change, chills, fever and unexpected weight change  HENT: Negative for congestion, ear pain, hearing loss and sore throat  Respiratory: Negative for cough, chest tightness and shortness of breath  Cardiovascular: Negative for chest pain and leg swelling     Gastrointestinal: Negative for abdominal pain, constipation, diarrhea, nausea and vomiting  Endocrine: Negative for polydipsia, polyphagia and polyuria  Genitourinary: Positive for vaginal pain  Negative for difficulty urinating, dysuria, frequency, menstrual problem, pelvic pain and vaginal discharge  Skin: Negative for color change and rash  Neurological: Negative for dizziness, numbness and headaches  Psychiatric/Behavioral: Negative for agitation and confusion  Objective     /70 (BP Location: Right arm, Patient Position: Sitting, Cuff Size: Standard)   Wt 73 5 kg (162 lb)   LMP 12/05/2021 (Approximate)   BMI 26 96 kg/m²       Physical Exam  Constitutional:       Appearance: Normal appearance  Genitourinary:      Vulva and rectum normal       Right Labia: No rash, tenderness or lesions  Left Labia: No tenderness, lesions or rash  No labial fusion noted  Vaginal discharge and bleeding present  No vaginal tenderness  Vaginal exam comments: Small blood in vault  KOH/wet mount performed- hyphae  No cervical motion tenderness or friability  HENT:      Head: Normocephalic and atraumatic  Eyes:      Extraocular Movements: Extraocular movements intact  Conjunctiva/sclera: Conjunctivae normal       Pupils: Pupils are equal, round, and reactive to light  Pulmonary:      Effort: Pulmonary effort is normal    Musculoskeletal:         General: Normal range of motion  Cervical back: Normal range of motion  Neurological:      General: No focal deficit present  Mental Status: She is alert and oriented to person, place, and time  Skin:     General: Skin is warm and dry  Psychiatric:         Mood and Affect: Mood normal          Behavior: Behavior normal          Thought Content: Thought content normal    Vitals and nursing note reviewed

## 2022-01-06 LAB
HBV SURFACE AG SER QL: NORMAL
HCV AB SER QL: NORMAL
HIV 1+2 AB+HIV1 P24 AG SERPL QL IA: NORMAL
RPR SER QL: NORMAL

## 2022-01-07 ENCOUNTER — TELEPHONE (OUTPATIENT)
Dept: OBGYN CLINIC | Facility: CLINIC | Age: 22
End: 2022-01-07

## 2022-01-08 LAB
C TRACH DNA SPEC QL NAA+PROBE: NEGATIVE
N GONORRHOEA DNA SPEC QL NAA+PROBE: NEGATIVE

## 2022-03-01 ENCOUNTER — APPOINTMENT (OUTPATIENT)
Dept: LAB | Facility: CLINIC | Age: 22
End: 2022-03-01
Payer: COMMERCIAL

## 2022-03-01 ENCOUNTER — OFFICE VISIT (OUTPATIENT)
Dept: RHEUMATOLOGY | Facility: CLINIC | Age: 22
End: 2022-03-01
Payer: COMMERCIAL

## 2022-03-01 VITALS
HEIGHT: 65 IN | WEIGHT: 162 LBS | BODY MASS INDEX: 26.99 KG/M2 | DIASTOLIC BLOOD PRESSURE: 76 MMHG | SYSTOLIC BLOOD PRESSURE: 124 MMHG

## 2022-03-01 DIAGNOSIS — R52 CHRONIC GENERALIZED PAIN: ICD-10-CM

## 2022-03-01 DIAGNOSIS — G89.21 CHRONIC PAIN DUE TO TRAUMA: ICD-10-CM

## 2022-03-01 DIAGNOSIS — M47.819 SPONDYLOARTHROPATHY: Primary | ICD-10-CM

## 2022-03-01 DIAGNOSIS — M79.10 MYALGIA: ICD-10-CM

## 2022-03-01 DIAGNOSIS — M25.50 ARTHRALGIA, UNSPECIFIED JOINT: ICD-10-CM

## 2022-03-01 DIAGNOSIS — M47.819 SPONDYLOARTHROPATHY: ICD-10-CM

## 2022-03-01 DIAGNOSIS — G89.29 CHRONIC GENERALIZED PAIN: ICD-10-CM

## 2022-03-01 LAB
25(OH)D3 SERPL-MCNC: 37.5 NG/ML (ref 30–100)
ALBUMIN SERPL BCP-MCNC: 3.8 G/DL (ref 3.5–5)
ALP SERPL-CCNC: 55 U/L (ref 46–116)
ALT SERPL W P-5'-P-CCNC: 26 U/L (ref 12–78)
ANION GAP SERPL CALCULATED.3IONS-SCNC: 11 MMOL/L (ref 4–13)
AST SERPL W P-5'-P-CCNC: 28 U/L (ref 5–45)
BASOPHILS # BLD AUTO: 0.06 THOUSANDS/ΜL (ref 0–0.1)
BASOPHILS NFR BLD AUTO: 1 % (ref 0–1)
BILIRUB SERPL-MCNC: 0.36 MG/DL (ref 0.2–1)
BUN SERPL-MCNC: 15 MG/DL (ref 5–25)
CALCIUM SERPL-MCNC: 9.2 MG/DL (ref 8.3–10.1)
CHLORIDE SERPL-SCNC: 102 MMOL/L (ref 100–108)
CK MB SERPL-MCNC: 2.8 NG/ML (ref 0–5)
CK MB SERPL-MCNC: <1 % (ref 0–2.5)
CK SERPL-CCNC: 300 U/L (ref 26–192)
CO2 SERPL-SCNC: 26 MMOL/L (ref 21–32)
CREAT SERPL-MCNC: 0.76 MG/DL (ref 0.6–1.3)
CRP SERPL QL: 4.6 MG/L
EOSINOPHIL # BLD AUTO: 0.31 THOUSAND/ΜL (ref 0–0.61)
EOSINOPHIL NFR BLD AUTO: 5 % (ref 0–6)
ERYTHROCYTE [DISTWIDTH] IN BLOOD BY AUTOMATED COUNT: 11.9 % (ref 11.6–15.1)
ERYTHROCYTE [SEDIMENTATION RATE] IN BLOOD: 3 MM/HOUR (ref 0–19)
GFR SERPL CREATININE-BSD FRML MDRD: 113 ML/MIN/1.73SQ M
GLUCOSE P FAST SERPL-MCNC: 86 MG/DL (ref 65–99)
HCT VFR BLD AUTO: 44.5 % (ref 34.8–46.1)
HGB BLD-MCNC: 15.2 G/DL (ref 11.5–15.4)
IMM GRANULOCYTES # BLD AUTO: 0.02 THOUSAND/UL (ref 0–0.2)
IMM GRANULOCYTES NFR BLD AUTO: 0 % (ref 0–2)
LYMPHOCYTES # BLD AUTO: 2.13 THOUSANDS/ΜL (ref 0.6–4.47)
LYMPHOCYTES NFR BLD AUTO: 33 % (ref 14–44)
MCH RBC QN AUTO: 32.6 PG (ref 26.8–34.3)
MCHC RBC AUTO-ENTMCNC: 34.2 G/DL (ref 31.4–37.4)
MCV RBC AUTO: 96 FL (ref 82–98)
MONOCYTES # BLD AUTO: 0.5 THOUSAND/ΜL (ref 0.17–1.22)
MONOCYTES NFR BLD AUTO: 8 % (ref 4–12)
NEUTROPHILS # BLD AUTO: 3.44 THOUSANDS/ΜL (ref 1.85–7.62)
NEUTS SEG NFR BLD AUTO: 53 % (ref 43–75)
NRBC BLD AUTO-RTO: 0 /100 WBCS
PLATELET # BLD AUTO: 314 THOUSANDS/UL (ref 149–390)
PMV BLD AUTO: 9.1 FL (ref 8.9–12.7)
POTASSIUM SERPL-SCNC: 3.9 MMOL/L (ref 3.5–5.3)
PROT SERPL-MCNC: 7.7 G/DL (ref 6.4–8.2)
RBC # BLD AUTO: 4.66 MILLION/UL (ref 3.81–5.12)
SODIUM SERPL-SCNC: 139 MMOL/L (ref 136–145)
TSH SERPL DL<=0.05 MIU/L-ACNC: 2.28 UIU/ML (ref 0.46–3.98)
VIT B12 SERPL-MCNC: 539 PG/ML (ref 100–900)
WBC # BLD AUTO: 6.46 THOUSAND/UL (ref 4.31–10.16)

## 2022-03-01 PROCEDURE — 84443 ASSAY THYROID STIM HORMONE: CPT

## 2022-03-01 PROCEDURE — 85652 RBC SED RATE AUTOMATED: CPT

## 2022-03-01 PROCEDURE — 80053 COMPREHEN METABOLIC PANEL: CPT

## 2022-03-01 PROCEDURE — 82550 ASSAY OF CK (CPK): CPT

## 2022-03-01 PROCEDURE — 81374 HLA I TYPING 1 ANTIGEN LR: CPT

## 2022-03-01 PROCEDURE — 82085 ASSAY OF ALDOLASE: CPT

## 2022-03-01 PROCEDURE — 86200 CCP ANTIBODY: CPT

## 2022-03-01 PROCEDURE — 82553 CREATINE MB FRACTION: CPT

## 2022-03-01 PROCEDURE — 82306 VITAMIN D 25 HYDROXY: CPT

## 2022-03-01 PROCEDURE — 85025 COMPLETE CBC W/AUTO DIFF WBC: CPT

## 2022-03-01 PROCEDURE — 86140 C-REACTIVE PROTEIN: CPT

## 2022-03-01 PROCEDURE — 36415 COLL VENOUS BLD VENIPUNCTURE: CPT

## 2022-03-01 PROCEDURE — 82607 VITAMIN B-12: CPT

## 2022-03-01 PROCEDURE — 99204 OFFICE O/P NEW MOD 45 MIN: CPT | Performed by: INTERNAL MEDICINE

## 2022-03-01 PROCEDURE — 86431 RHEUMATOID FACTOR QUANT: CPT

## 2022-03-01 PROCEDURE — 86235 NUCLEAR ANTIGEN ANTIBODY: CPT

## 2022-03-01 PROCEDURE — 86430 RHEUMATOID FACTOR TEST QUAL: CPT

## 2022-03-01 NOTE — PROGRESS NOTES
Rheumatology Consult   Danny Chairez 22 y o  female 2000    DATE: 3/1/2022    Reason for Consult: diffuse arthralgias/myalgias and spinal pain    Assessment and Plan:  Arthralgias/myalgias, inflammatory spinal pain--check serologies and muscle-specific enzymes  Check TSH, B12   Raynaud's--check anti-Centromere/RNP antibodies  Muscle stretching, topical analgesics  Anti-inflammatory diet/exercise  F/u in 6-8 weeks to discuss results and assess response to Flexeril  Recc  Neuro  eval  for mult  paresthesias, history of headache and mult  concussions  History of Present Illness:  Danny Chairez is a 22 y o  female Here for initial eval   She describes diffuse spinal stiffness and paraspinal muscle discomfort and "tightening "  She has paresthesias affecting her hands and feet  +Raynaud's affecting her feet  +diffuse myalgias  Did not start Flexeril or Neurontin  No recent labs available for review  Performing chiropractic weekly  Review of Systems  Review of Systems   Constitutional: Negative for fatigue, fever and unexpected weight change  HENT: Negative for mouth sores  Respiratory: Negative for cough and shortness of breath  Cardiovascular: Negative for chest pain and leg swelling  Gastrointestinal: Negative for abdominal pain, constipation and diarrhea  Musculoskeletal: Positive for arthralgias, back pain, myalgias, neck pain and neck stiffness  Negative for joint swelling  Skin: Negative for color change and rash  Neurological: Negative for weakness  Hematological: Negative for adenopathy  Psychiatric/Behavioral: Negative for sleep disturbance         Allergies  Allergies   Allergen Reactions    Apple - Food Allergy     Other Other (See Comments)     Per patient, itchy eyes, stuffy nose & sneezing --Cats    Prunus Persica        Current Medications      Past Medical History  Past Medical History:   Diagnosis Date    Endometriosis        Past Surgical History  Past Surgical History:   Procedure Laterality Date    LEG TENDON SURGERY      WISDOM TOOTH EXTRACTION         Family History  No known autoimmune or inflammatory diseases in the family  Family History   Problem Relation Age of Onset    No Known Problems Mother     No Known Problems Father        Social History  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  Social History     Substance and Sexual Activity   Alcohol Use Not Currently    Comment: social      Social History     Substance and Sexual Activity   Drug Use Yes    Types: Marijuana    Comment: social      Social History     Tobacco Use   Smoking Status Never Smoker   Smokeless Tobacco Never Used        Objective:  /76   Ht 5' 5" (1 651 m)   Wt 73 5 kg (162 lb)   BMI 26 96 kg/m²     Physical Exam  Constitutional:       General: She is not in acute distress  HENT:      Head: Normocephalic and atraumatic  Eyes:      Conjunctiva/sclera: Conjunctivae normal    Cardiovascular:      Rate and Rhythm: Normal rate and regular rhythm  Heart sounds: S1 normal and S2 normal  No friction rub  Pulmonary:      Effort: Pulmonary effort is normal  No respiratory distress  Breath sounds: Normal breath sounds  No wheezing, rhonchi or rales  Musculoskeletal:      Right hand: Tenderness present  Left hand: Tenderness present  Cervical back: Neck supple  Skin:     General: Skin is warm and dry  Coloration: Skin is not pale  Findings: No rash  Neurological:      Mental Status: She is alert  Mental status is at baseline  Psychiatric:         Mood and Affect: Mood normal          Behavior: Behavior normal             Lab Results: I have personally reviewed pertinent reports        CBC:   , Chemistry Profile:       Invalid input(s): ALBUMIN, Coagulation Studies:   , Cardiac Studies:   , Additional Labs:   , iSTAT CHEM 8:       Invalid input(s): POTASSIUMIS, ABG:   , Toxicology:   , Last A1C/Lipid Panel/Thyroid Panel: No results found for: HGBA1C, TRIG, CHOL, HDL, LDLCALC, UKP2HMIVFEBM, T3FREE, N7TBGSV, FREET4  Lab Results   Component Value Date    HEPCAB Non-reactive 01/05/2022           Invalid input(s): URIBILINOGEN         Imaging: I have personally reviewed pertinent films in PACS

## 2022-03-01 NOTE — PATIENT INSTRUCTIONS
Continue stretching every day  Use warm moist heat on painful stiff muscles  You can also try over the counter lidocaine patches to your painful back (Salonpas)  Apply one patch to the painful back area and leave on for 12 hours  Take the Flexeril (cyclobenzaprine) at bedtime  Also, please make an appointment to see a Neurologist give the history of multiple concussions, "head discomfort," and tingling that you feel in your hands and feet  I ordered some blood work for you to have done today  We'll discuss the results at a virtual/telephone follow up appointment in 6-8 weeks

## 2022-03-02 LAB
ALDOLASE SERPL-CCNC: 5.3 U/L (ref 3.3–10.3)
CENTROMERE B AB SER-ACNC: <0.2 AI (ref 0–0.9)
CRYOGLOB RF SER-ACNC: ABNORMAL [IU]/ML
ENA RNP AB SER-ACNC: <0.2 AI (ref 0–0.9)
ENA SM AB SER-ACNC: <0.2 AI (ref 0–0.9)
RHEUMATOID FACT SER QL LA: POSITIVE

## 2022-03-04 LAB — CCP AB SER IA-ACNC: 1.1

## 2022-03-11 LAB — HLA-B27 QL NAA+PROBE: NEGATIVE

## 2022-05-23 ENCOUNTER — TELEMEDICINE (OUTPATIENT)
Dept: RHEUMATOLOGY | Facility: CLINIC | Age: 22
End: 2022-05-23
Payer: COMMERCIAL

## 2022-05-23 DIAGNOSIS — M79.10 MYALGIA: Primary | ICD-10-CM

## 2022-05-23 DIAGNOSIS — M19.90 ARTHRITIS: ICD-10-CM

## 2022-05-23 PROCEDURE — 99213 OFFICE O/P EST LOW 20 MIN: CPT | Performed by: INTERNAL MEDICINE

## 2022-05-23 NOTE — PROGRESS NOTES
Virtual Brief Visit    Patient is located in the following state in which I hold an active license [place]      Assessment/Plan:    Problem List Items Addressed This Visit    None         Recent Visits  No visits were found meeting these conditions  Showing recent visits within past 7 days and meeting all other requirements  Future Appointments  No visits were found meeting these conditions  Showing future appointments within next 150 days and meeting all other requirements         I spent 15 minutes directly with the patient during this visit   Assessment and Plan:   +RF, +CRP, arthralgias/myalgias--once COVID infection has resloved, will initiate a short course of corticosteroids  Medrol prescription will be sent in  Continue to monitor symptoms  Will repeat CRP and CK once COVID infection has resolved and patient understands  Rheumatic Disease Summary  As above    Here for a virtual f/u visit  Last visit in March 2022  She has an active COVID infection  Still with arthralgias and myalgias  No other complaints  The following portions of the patient's history were reviewed and updated as appropriate: allergies, current medications, past family history, past medical history, past social history, past surgical history and problem list     Review of Systems:   Review of Systems   Constitutional: Positive for fatigue  HENT: Negative for mouth sores  Eyes: Negative for pain  Respiratory: Negative for shortness of breath  Cardiovascular: Negative for leg swelling  Musculoskeletal: Positive for arthralgias  Negative for joint swelling  Skin: Negative for rash  Neurological: Negative for weakness  Hematological: Negative for adenopathy  Psychiatric/Behavioral: Negative for sleep disturbance         Home Medications:    Current Outpatient Medications:     Cryselle-28 0 3-30 MG-MCG per tablet, Take 1 tablet by mouth daily, Disp: 90 tablet, Rfl: 3    cyclobenzaprine (FLEXERIL) 10 mg tablet, Take 1 tablet (10 mg total) by mouth 3 (three) times a day as needed for muscle spasms, Disp: 90 tablet, Rfl: 0    gabapentin (NEURONTIN) 300 mg capsule, Take 1 capsule (300 mg total) by mouth 3 (three) times a day, Disp: 90 capsule, Rfl: 1    ibuprofen (MOTRIN) 200 mg tablet, Take 400 mg by mouth every 6 (six) hours Take RTC for 1-2 weeks, Disp: , Rfl:     Objective: There were no vitals filed for this visit  Physical Exam  Constitutional:       General: She is not in acute distress  HENT:      Head: Normocephalic and atraumatic  Eyes:      Conjunctiva/sclera: Conjunctivae normal    Pulmonary:      Effort: Pulmonary effort is normal  No respiratory distress  Musculoskeletal:      Cervical back: Neck supple  Skin:     Coloration: Skin is not pale  Neurological:      Mental Status: She is alert  Mental status is at baseline  Psychiatric:         Mood and Affect: Mood normal          Behavior: Behavior normal          Reviewed labs and imaging  Imaging:   No results found      Labs:   Appointment on 03/01/2022   Component Date Value Ref Range Status    Aldolase 03/01/2022 5 3  3 3 - 10 3 U/L Final    WBC 03/01/2022 6 46  4 31 - 10 16 Thousand/uL Final    RBC 03/01/2022 4 66  3 81 - 5 12 Million/uL Final    Hemoglobin 03/01/2022 15 2  11 5 - 15 4 g/dL Final    Hematocrit 03/01/2022 44 5  34 8 - 46 1 % Final    MCV 03/01/2022 96  82 - 98 fL Final    MCH 03/01/2022 32 6  26 8 - 34 3 pg Final    MCHC 03/01/2022 34 2  31 4 - 37 4 g/dL Final    RDW 03/01/2022 11 9  11 6 - 15 1 % Final    MPV 03/01/2022 9 1  8 9 - 12 7 fL Final    Platelets 49/95/7364 314  149 - 390 Thousands/uL Final    nRBC 03/01/2022 0  /100 WBCs Final    Neutrophils Relative 03/01/2022 53  43 - 75 % Final    Immat GRANS % 03/01/2022 0  0 - 2 % Final    Lymphocytes Relative 03/01/2022 33  14 - 44 % Final    Monocytes Relative 03/01/2022 8  4 - 12 % Final    Eosinophils Relative 03/01/2022 5  0 - 6 % Final    Basophils Relative 03/01/2022 1  0 - 1 % Final    Neutrophils Absolute 03/01/2022 3 44  1 85 - 7 62 Thousands/µL Final    Immature Grans Absolute 03/01/2022 0 02  0 00 - 0 20 Thousand/uL Final    Lymphocytes Absolute 03/01/2022 2 13  0 60 - 4 47 Thousands/µL Final    Monocytes Absolute 03/01/2022 0 50  0 17 - 1 22 Thousand/µL Final    Eosinophils Absolute 03/01/2022 0 31  0 00 - 0 61 Thousand/µL Final    Basophils Absolute 03/01/2022 0 06  0 00 - 0 10 Thousands/µL Final    Total CK 03/01/2022 300 (A) 26 - 192 U/L Final    Sodium 03/01/2022 139  136 - 145 mmol/L Final    Potassium 03/01/2022 3 9  3 5 - 5 3 mmol/L Final    Chloride 03/01/2022 102  100 - 108 mmol/L Final    CO2 03/01/2022 26  21 - 32 mmol/L Final    ANION GAP 03/01/2022 11  4 - 13 mmol/L Final    BUN 03/01/2022 15  5 - 25 mg/dL Final    Creatinine 03/01/2022 0 76  0 60 - 1 30 mg/dL Final    Standardized to IDMS reference method    Glucose, Fasting 03/01/2022 86  65 - 99 mg/dL Final    Specimen collection should occur prior to Sulfasalazine administration due to the potential for falsely depressed results  Specimen collection should occur prior to Sulfapyridine administration due to the potential for falsely elevated results   Calcium 03/01/2022 9 2  8 3 - 10 1 mg/dL Final    AST 03/01/2022 28  5 - 45 U/L Final    Specimen collection should occur prior to Sulfasalazine administration due to the potential for falsely depressed results   ALT 03/01/2022 26  12 - 78 U/L Final    Specimen collection should occur prior to Sulfasalazine administration due to the potential for falsely depressed results       Alkaline Phosphatase 03/01/2022 55  46 - 116 U/L Final    Total Protein 03/01/2022 7 7  6 4 - 8 2 g/dL Final    Albumin 03/01/2022 3 8  3 5 - 5 0 g/dL Final    Total Bilirubin 03/01/2022 0 36  0 20 - 1 00 mg/dL Final    Use of this assay is not recommended for patients undergoing treatment with eltrombopag due to the potential for falsely elevated results   eGFR 03/01/2022 113  ml/min/1 73sq m Final    CRP 03/01/2022 4 6 (A) <3 0 mg/L Final    Sed Rate 03/01/2022 3  0 - 19 mm/hour Final    TSH 3RD GENERATON 03/01/2022 2 278  0 463 - 3 980 uIU/mL Final    The recommended reference ranges for TSH during pregnancy are as follows:   First trimester 0 1 to 2 5 uIU/mL   Second trimester  0 2 to 3 0 uIU/mL   Third trimester 0 3 to 3 0 uIU/m    Note: Normal ranges may not apply to patients who are transgender, non-binary, or whose legal sex, sex at birth, and gender identity differ   Vitamin B-12 03/01/2022 539  100 - 900 pg/mL Final    Vit D, 25-Hydroxy 03/01/2022 37 5  30 0 - 100 0 ng/mL Final    Rheumatoid Factor 03/01/2022 Positive (A) Negative Final    See RF Quantitation    HLA B27 03/01/2022 Negative   Final    HLA-B*27 Negative  B27 allele interpretation for all loci based on IMGT/HLA  database version 3 44  This test was developed and its performance characteristics  determined by [facility]   It has not been cleared or approved  by the Food and Drug Administration  [facility] CLIA ID Number [id number]  This test was performed using PCR (Polymerase Chain Reaction)/SSOP  (Sequence Specific Oligonucleotide Probes) technique  SBT (Sequence  Based Typing) and/or SSP (Sequence Specific Primers) may be used as  supplemental methods when necessary  Please contact [facility] Customer  Service at [phone] if you have any questions     Director of [facility] Laboratory   Dr Mariama Jacobs, PhD    Anti-Centromere B Antibodies 03/01/2022 <0 2  0 0 - 0 9 AI Final    Cyclic Citrullinated Peptide Ab  03/01/2022 1 1  See comment Final    NIXON Rivas (SM) Ab 03/01/2022 <0 2  0 0 - 0 9 AI Final    NIXON RNP Ab 03/01/2022 <0 2  0 0 - 0 9 AI Final    CK-MB Index 03/01/2022 <1 0  0 0 - 2 5 % Final    CK-MB 03/01/2022 2 8  0 0 - 5 0 ng/mL Final    RF Quantitation 03/01/2022 32 IU/mL (A) (none) Final   Lab on 01/05/2022   Component Date Value Ref Range Status  HIV-1/HIV-2 Ab 01/05/2022 Non-Reactive  Non-Reactive Final    Hepatitis C Ab 01/05/2022 Non-reactive  Non-reactive Final    Hepatitis B Surface Ag 01/05/2022 Non-reactive  Non-reactive, NonReactive - Confirmed Final    RPR 01/05/2022 Non-Reactive  Non-Reactive Final   Office Visit on 01/05/2022   Component Date Value Ref Range Status    N gonorrhoeae, DNA Probe 01/05/2022 Negative  Negative Final    Chlamydia trachomatis, DNA Probe 01/05/2022 Negative  Negative Final

## 2022-05-23 NOTE — PATIENT INSTRUCTIONS
Continue to monitor your symptoms  Please have your blood work done once you are feeling better  We can schedule a follow up appointment in 2-3 months

## 2022-06-01 ENCOUNTER — TELEPHONE (OUTPATIENT)
Dept: OBGYN CLINIC | Facility: HOSPITAL | Age: 22
End: 2022-06-01

## 2022-06-01 DIAGNOSIS — M19.90 ARTHRITIS: Primary | ICD-10-CM

## 2022-06-01 NOTE — TELEPHONE ENCOUNTER
Patient was called, she needs blood work entered into the system to check her inflammation levels  They were high on her last results and was told to have them rechecked prior to her next appointment

## 2022-06-01 NOTE — TELEPHONE ENCOUNTER
Patient needs lab work script uploaded in the system  Please call patient when ready at [phone]

## 2022-06-02 NOTE — TELEPHONE ENCOUNTER
Patient's labs were faxed to [address] [place] [address] labs, and patient was called to let her know

## 2022-06-03 NOTE — TELEPHONE ENCOUNTER
Patient was called and spoken with, She stated " that the lab had them in the system , but kept telling her they were not there  She made them re-check and they found them " Patient had her lab work completed

## 2022-06-17 ENCOUNTER — TELEPHONE (OUTPATIENT)
Dept: ADMINISTRATIVE | Facility: OTHER | Age: 22
End: 2022-06-17

## 2022-06-17 ENCOUNTER — TELEPHONE (OUTPATIENT)
Dept: OTHER | Facility: OTHER | Age: 22
End: 2022-06-17

## 2022-06-17 NOTE — TELEPHONE ENCOUNTER
----- Message from Jett Akins sent at 6/17/2022  8:13 AM EDT -----  Regarding: cervical cancer screening    Hello, our patient attached above has had Pap Smear (HPV) aka Cervical Cancer Screening completed/performed  Please assist in updating the patient chart by pulling the Care Everywhere (CE) document  The date of service is 9/9/2020       Thank you,  bell

## 2022-06-17 NOTE — TELEPHONE ENCOUNTER
Upon review of the In Basket request we were able to locate, review, and update the patient chart as requested for Pap Smear (HPV) aka Cervical Cancer Screening  Any additional questions or concerns should be emailed to the Practice Liaisons via [email or web address] email, please do not reply via In Basket      Thank you  Arianna Pearce

## 2022-06-20 ENCOUNTER — OFFICE VISIT (OUTPATIENT)
Dept: FAMILY MEDICINE CLINIC | Facility: CLINIC | Age: 22
End: 2022-06-20
Payer: COMMERCIAL

## 2022-06-20 VITALS
HEIGHT: 66 IN | RESPIRATION RATE: 16 BRPM | BODY MASS INDEX: 27.71 KG/M2 | WEIGHT: 172.4 LBS | TEMPERATURE: 97.7 F | DIASTOLIC BLOOD PRESSURE: 78 MMHG | HEART RATE: 93 BPM | SYSTOLIC BLOOD PRESSURE: 112 MMHG | OXYGEN SATURATION: 98 %

## 2022-06-20 DIAGNOSIS — E66.3 OVERWEIGHT (BMI 25.0-29.9): ICD-10-CM

## 2022-06-20 DIAGNOSIS — Z11.1 SCREENING-PULMONARY TB: ICD-10-CM

## 2022-06-20 DIAGNOSIS — Z00.00 ANNUAL PHYSICAL EXAM: Primary | ICD-10-CM

## 2022-06-20 DIAGNOSIS — Z23 ENCOUNTER FOR IMMUNIZATION: ICD-10-CM

## 2022-06-20 PROCEDURE — 86580 TB INTRADERMAL TEST: CPT

## 2022-06-20 PROCEDURE — 90715 TDAP VACCINE 7 YRS/> IM: CPT

## 2022-06-20 PROCEDURE — 90471 IMMUNIZATION ADMIN: CPT

## 2022-06-20 PROCEDURE — 99395 PREV VISIT EST AGE 18-39: CPT | Performed by: NURSE PRACTITIONER

## 2022-06-20 NOTE — ASSESSMENT & PLAN NOTE
Unremarkable exam of healthy adult female  UTD with gyn screening  tdap administered today  Continue with healthy diet, regular exercise  utd with dental and eye exams

## 2022-06-20 NOTE — PATIENT INSTRUCTIONS

## 2022-06-20 NOTE — ASSESSMENT & PLAN NOTE
Intentional weight gain reported, increased calorie intake to put on muscle  Continue with healthy diet, lean protein, regular exercise

## 2022-06-20 NOTE — PROGRESS NOTES
[facility]    NAME: Naman Almanzar  AGE: 25 y o  SEX: female  : 2000     DATE: 2022     Assessment and Plan:     Problem List Items Addressed This Visit        Other    Annual physical exam - Primary     Unremarkable exam of healthy adult female  UTD with gyn screening  tdap administered today  Continue with healthy diet, regular exercise  utd with dental and eye exams  Overweight (BMI 25 0-29  9)     Intentional weight gain reported, increased calorie intake to put on muscle  Continue with healthy diet, lean protein, regular exercise  Other Visit Diagnoses     Encounter for immunization        Relevant Orders    TDAP VACCINE GREATER THAN OR EQUAL TO 6YO IM    Screening-pulmonary TB        Relevant Orders    TB Skin Test          Immunizations and preventive care screenings were discussed with patient today  Appropriate education was printed on patient's after visit summary  Counseling:  Dental Health: discussed importance of regular tooth brushing, flossing, and dental visits  Injury prevention: discussed safety/seat belts, safety helmets, smoke detectors, carbon dioxide detectors, and smoking near bedding or upholstery  Sexual health: discussed sexually transmitted diseases, partner selection, use of condoms, avoidance of unintended pregnancy, and contraceptive alternatives  Exercise: the importance of regular exercise/physical activity was discussed  Recommend exercise 3-5 times per week for at least 30 minutes  BMI Counseling: Body mass index is 28 17 kg/m²  The BMI is above normal  Nutrition recommendations include decreasing portion sizes, encouraging healthy choices of fruits and vegetables, consuming healthier snacks, moderation in carbohydrate intake and increasing intake of lean protein   Exercise recommendations include moderate physical activity 150 minutes/week, exercising 3-5 times per week and strength training exercises  Rationale for BMI follow-up plan is due to patient being overweight or obese  Depression Screening and Follow-up Plan: Patient was screened for depression during today's encounter  They screened negative with a PHQ-2 score of 0  Return in about 1 year (around 6/20/2023) for Annual physical      Chief Complaint:     Chief Complaint   Patient presents with    Physical Exam     Patient is here for her physical       History of Present Illness:     Adult Annual Physical   Patient here for a comprehensive physical exam  The patient reports no problems  Diet and Physical Activity  Diet/Nutrition: well balanced diet, consuming 3-5 servings of fruits/vegetables daily, adequate fiber intake and adequate whole grain intake  Exercise: moderate cardiovascular exercise, strength training exercises, 5-7 times a week on average and 1-2 hours on average  Depression Screening  PHQ-2/9 Depression Screening    Little interest or pleasure in doing things: 0 - not at all  Feeling down, depressed, or hopeless: 0 - not at all  PHQ-2 Score: 0  PHQ-2 Interpretation: Negative depression screen       General Health  Sleep: sleeps well and gets 7-8 hours of sleep on average  Hearing: normal - bilateral   Vision: no vision problems, goes for regular eye exams, most recent eye exam <1 year ago and wears glasses and contacts  Dental: regular dental visits and brushes teeth twice daily  /GYN Health  Last menstrual period: 5/20/22  Contraceptive method: none  History of STDs?: no      Review of Systems:     Review of Systems   Constitutional: Negative for activity change, appetite change, chills, diaphoresis, fatigue, fever and unexpected weight change  HENT: Negative for hearing loss  Eyes: Negative for visual disturbance  Respiratory: Negative for cough, chest tightness and shortness of breath      Cardiovascular: Negative for chest pain (with vigerous cardiovascular activity gets chest wall pain with inhalation), palpitations and leg swelling  Gastrointestinal: Negative for constipation, diarrhea, nausea and vomiting  Genitourinary: Negative for difficulty urinating, dysuria, frequency and menstrual problem  Musculoskeletal: Negative for arthralgias, back pain and myalgias  Skin: Negative  Allergic/Immunologic: Negative for environmental allergies  Neurological: Negative for dizziness, weakness, light-headedness, numbness and headaches  Psychiatric/Behavioral: Negative for dysphoric mood, self-injury, sleep disturbance and suicidal ideas  The patient is not nervous/anxious         Past Medical History:     Past Medical History:   Diagnosis Date    Endometriosis       Past Surgical History:     Past Surgical History:   Procedure Laterality Date    LEG TENDON SURGERY      WISDOM TOOTH EXTRACTION        Social History:     Social History     Socioeconomic History    Marital status: Single     Spouse name: None    Number of children: None    Years of education: None    Highest education level: None   Occupational History    None   Tobacco Use    Smoking status: Never Smoker    Smokeless tobacco: Never Used   Vaping Use    Vaping Use: Never used   Substance and Sexual Activity    Alcohol use: Not Currently     Comment: social     Drug use: Yes     Types: Marijuana     Comment: social     Sexual activity: Yes     Partners: Male     Birth control/protection: OCP   Other Topics Concern    None   Social History Narrative    None     Social Determinants of Health     Financial Resource Strain: Not on file   Food Insecurity: Not on file   Transportation Needs: Not on file   Physical Activity: Not on file   Stress: Not on file   Social Connections: Not on file   Intimate Partner Violence: Not on file   Housing Stability: Not on file      Family History:     Family History   Problem Relation Age of Onset    No Known Problems Mother    Heather Arroyo No Known Problems Father       Current Medications:     Current Outpatient Medications   Medication Sig Dispense Refill    ibuprofen (MOTRIN) 200 mg tablet Take 400 mg by mouth every 6 (six) hours Take RTC for 1-2 weeks (Patient not taking: Reported on 6/20/2022)       No current facility-administered medications for this visit  Allergies: Allergies   Allergen Reactions    Apple - Food Allergy     Other Other (See Comments)     Per patient, itchy eyes, stuffy nose & sneezing --Cats    Prunus Persica       Physical Exam:     /78   Pulse 93   Temp 97 7 °F (36 5 °C)   Resp 16   Ht 5' 5 59" (1 666 m)   Wt 78 2 kg (172 lb 6 4 oz)   SpO2 98%   BMI 28 17 kg/m²     Physical Exam  Vitals reviewed  Constitutional:       General: She is awake  She is not in acute distress  Appearance: Normal appearance  She is well-developed and well-groomed  She is not ill-appearing  HENT:      Head: Normocephalic  Right Ear: Hearing, tympanic membrane, ear canal and external ear normal       Left Ear: Hearing, tympanic membrane, ear canal and external ear normal       Nose: Nose normal       Mouth/Throat:      Lips: Pink  Mouth: Mucous membranes are moist       Pharynx: Uvula midline  Tonsils: No tonsillar exudate  2+ on the right  2+ on the left  Eyes:      General: Lids are normal       Conjunctiva/sclera: Conjunctivae normal       Pupils: Pupils are equal, round, and reactive to light  Neck:      Thyroid: No thyroid mass or thyromegaly  Vascular: Normal carotid pulses  No carotid bruit or JVD  Cardiovascular:      Rate and Rhythm: Normal rate and regular rhythm  Pulses: Normal pulses  Heart sounds: Normal heart sounds, S1 normal and S2 normal  No murmur heard  Pulmonary:      Effort: Pulmonary effort is normal       Breath sounds: Normal breath sounds  Abdominal:      General: Abdomen is flat  Bowel sounds are normal       Palpations: Abdomen is soft  Tenderness: There is no abdominal tenderness  Musculoskeletal:         General: Normal range of motion  Cervical back: Full passive range of motion without pain  Right lower leg: No edema  Left lower leg: No edema  Lymphadenopathy:      Head:      Right side of head: No submental, submandibular, tonsillar, preauricular, posterior auricular or occipital adenopathy  Left side of head: No submental, submandibular, tonsillar, preauricular, posterior auricular or occipital adenopathy  Cervical: No cervical adenopathy  Skin:     General: Skin is warm and dry  Neurological:      Mental Status: She is alert and oriented to person, place, and time  Sensory: No sensory deficit  Psychiatric:         Attention and Perception: Attention normal          Mood and Affect: Mood normal          Speech: Speech normal          Behavior: Behavior normal  Behavior is cooperative  Thought Content:  Thought content normal          Cognition and Memory: Cognition normal          Judgment: Judgment normal           Mia Pro, [address] [place] [address]

## 2022-06-21 ENCOUNTER — NURSE TRIAGE (OUTPATIENT)
Dept: OTHER | Facility: OTHER | Age: 22
End: 2022-06-21

## 2022-06-21 DIAGNOSIS — B37.9 YEAST INFECTION: Primary | ICD-10-CM

## 2022-06-21 RX ORDER — FLUCONAZOLE 150 MG/1
150 TABLET ORAL ONCE
Qty: 1 TABLET | Refills: 0 | Status: SHIPPED | OUTPATIENT
Start: 2022-06-21 | End: 2022-06-21

## 2022-06-21 NOTE — TELEPHONE ENCOUNTER
Pt called in stating she was just seen for  PE yesterday and woke up today with Yeast infection sx's  Per pt she is having white cottage cheese like discharge and severe vaginal itching  Pt stated she gets yeast infections often and would like a Rx called in for her  Please advise

## 2022-06-21 NOTE — TELEPHONE ENCOUNTER
Reason for Disposition   Symptoms of a vaginal yeast infection (i e , white, thick, cottage-cheese-like, itchy, not bad smelling discharge)    Answer Assessment - Initial Assessment Questions  1  DISCHARGE: "Describe the discharge " (e g , white, yellow, green, gray, foamy, cottage cheese-like)      White discharge   2  ODOR: "Is there a bad odor?"      Denies  3  ONSET: "When did the discharge begin?"      Started yesterday   4  RASH: "Is there a rash in that area?" If Yes, ask: "Describe it " (e g , redness, blisters, sores, bumps)      Denies  5  ABDOMINAL PAIN: "Are you having any abdominal pain?" If Yes, ask: "What does it feel like? " (e g , crampy, dull, intermittent, constant)       Denies  6  ABDOMINAL PAIN SEVERITY: If present, ask: "How bad is it?"  (e g , mild, moderate, severe)   - MILD - doesn't interfere with normal activities    - MODERATE - interferes with normal activities or awakens from sleep    - SEVERE - patient doesn't want to move (R/O peritonitis)       N/a   7  CAUSE: "What do you think is causing the discharge?" "Have you had the same problem before? What happened then?"      Frequent Yeast infections  8  OTHER SYMPTOMS: "Do you have any other symptoms?" (e g , fever, itching, vaginal bleeding, pain with urination, injury to genital area, vaginal foreign body)      Vaginal itchy and dryness noted      Protocols used: VAGINAL DISCHARGE-ADULT-OH

## 2022-06-21 NOTE — TELEPHONE ENCOUNTER
Regarding: Yeast infection  ----- Message from Keila Barnes sent at 6/21/2022  8:30 AM EDT -----  "I have a yeast infection "

## 2022-06-22 ENCOUNTER — CLINICAL SUPPORT (OUTPATIENT)
Dept: FAMILY MEDICINE CLINIC | Facility: CLINIC | Age: 22
End: 2022-06-22

## 2022-06-22 DIAGNOSIS — Z11.1 ENCOUNTER FOR PPD SKIN TEST READING: Primary | ICD-10-CM

## 2022-06-22 LAB
INDURATION: 0 MM
TB SKIN TEST: NEGATIVE

## 2022-06-22 NOTE — PROGRESS NOTES
Assessment/Plan:    No problem-specific Assessment & Plan notes found for this encounter  Diagnoses and all orders for this visit:    Encounter for PPD skin test reading          Subjective:      Patient ID: Naman Almanzar is a 25 y o  female  HPI        Review of Systems      Objective: There were no vitals taken for this visit           Physical Exam

## 2022-06-28 ENCOUNTER — OFFICE VISIT (OUTPATIENT)
Dept: OBGYN CLINIC | Facility: CLINIC | Age: 22
End: 2022-06-28
Payer: COMMERCIAL

## 2022-06-28 VITALS
DIASTOLIC BLOOD PRESSURE: 72 MMHG | WEIGHT: 170 LBS | BODY MASS INDEX: 27.32 KG/M2 | HEIGHT: 66 IN | SYSTOLIC BLOOD PRESSURE: 110 MMHG

## 2022-06-28 DIAGNOSIS — Z11.3 SCREENING FOR STD (SEXUALLY TRANSMITTED DISEASE): Primary | ICD-10-CM

## 2022-06-28 DIAGNOSIS — N76.0 ACUTE VAGINITIS: ICD-10-CM

## 2022-06-28 PROCEDURE — 99214 OFFICE O/P EST MOD 30 MIN: CPT | Performed by: PHYSICIAN ASSISTANT

## 2022-06-28 RX ORDER — FLUCONAZOLE 150 MG/1
150 TABLET ORAL
Qty: 2 TABLET | Refills: 0 | Status: SHIPPED | OUTPATIENT
Start: 2022-06-28 | End: 2022-07-02

## 2022-06-28 NOTE — PROGRESS NOTES
Assessment/Plan:  - Start diflucan   - Keep area clean and dry, avoid fragrant soaps, wet bathing suits  - Sureswab collected  - Recommend repeat UPT if no menses in 1 week  - Recommend complete STI labs in 1 month  - Will reach out to patient with results  Diagnoses and all orders for this visit:    Screening for STD (sexually transmitted disease)  -     Cancel: Chlamydia/GC amplified DNA by PCR  -     Hepatitis B surface antigen; Future  -     Hepatitis C antibody; Future  -     HIV 1/2 Antigen/Antibody (4th Generation) w Reflex [facility]; Future  -     RPR; Future  -     Sureswab(R), Vaginosis/Vaginitis Plus    Acute vaginitis  -     Sureswab(R), Vaginosis/Vaginitis Plus  -     fluconazole (DIFLUCAN) 150 mg tablet; Take 1 tablet (150 mg total) by mouth every third day for 2 doses          Subjective:      Patient ID: Luis Alcala is a 25 y o  female  Amandohank Power is a 23Yo G0 WF presenting to the office with complaints of vaginal itching and irritation x 2 weeks  Patient reports she was intoxicated on 6/12 and had unprotected sex  She reports that she is not pursuing any type of assault charges, even though she states she would not have had intercourse if she was sober  She states that since that time, she has experienced vaginal itching and irritation with a white discharge  She has been prone to yeast infections in the past and took a diflucan which she does not feel helped her symptoms  She reports she would like STI screening today  She denies vaginal odor or yellow/green discharge  She also reports she has been under stress and was due for her period 1 week ago  She took a negative UPT this past week         The following portions of the patient's history were reviewed and updated as appropriate: allergies, current medications, past family history, past medical history, past social history, past surgical history and problem list     Review of Systems   Constitutional: Negative for chills, fever and unexpected weight change  Respiratory: Negative for shortness of breath  Cardiovascular: Negative for chest pain  Gastrointestinal: Negative for abdominal pain, diarrhea, nausea and vomiting  Genitourinary: Positive for vaginal discharge  Skin: Negative for rash  Objective:      /72 (BP Location: Right arm, Patient Position: Sitting, Cuff Size: Standard)   Ht 5' 5 5" (1 664 m)   Wt 77 1 kg (170 lb)   LMP 05/25/2022 (Exact Date)   BMI 27 86 kg/m²          Physical Exam  Vitals reviewed  Constitutional:       Appearance: Normal appearance  HENT:      Head: Normocephalic and atraumatic  Pulmonary:      Effort: Pulmonary effort is normal    Abdominal:      General: There is no distension  Palpations: Abdomen is soft  Tenderness: There is no abdominal tenderness  Genitourinary:     General: Normal vulva  Exam position: Lithotomy position  Pubic Area: No rash  Labia:         Right: No rash or lesion  Left: No rash or lesion  Vagina: Vaginal discharge (mild white clumped discharge) present  No tenderness or lesions  Cervix: No discharge or lesion  Skin:     General: Skin is warm and dry  Findings: No lesion or rash  Neurological:      General: No focal deficit present  Mental Status: She is alert

## 2022-07-03 LAB
A VAGINAE DNA VAG NAA+PROBE-LOG#: NOT DETECTED LOG CELLS/ML
C GLABRATA DNA VAG QL NAA+PROBE: NOT DETECTED
C TRACH RRNA SPEC QL NAA+PROBE: NOT DETECTED
CANDIDA DNA VAG QL NAA+PROBE: NOT DETECTED
G VAGINALIS DNA VAG NAA+PROBE-LOG#: 5.1 LOG (CELLS/ML)
LACTOBACILLUS DNA VAG NAA+PROBE-LOG#: 7.1 LOG (CELLS/ML)
MEGASPHAERA SP DNA VAG NAA+PROBE-LOG#: NOT DETECTED LOG CELLS/ML
N GONORRHOEA RRNA SPEC QL NAA+PROBE: NOT DETECTED
SL AMB BV CATEGORY:: NORMAL
SL AMB C. PARAPSILOSIS, DNA: NOT DETECTED
SL AMB C. TROPICALIS, DNA: NOT DETECTED
T VAGINALIS RRNA SPEC QL NAA+PROBE: NOT DETECTED

## 2022-08-01 ENCOUNTER — TELEPHONE (OUTPATIENT)
Dept: OTHER | Facility: OTHER | Age: 22
End: 2022-08-01

## 2022-11-14 ENCOUNTER — TELEPHONE (OUTPATIENT)
Dept: OTHER | Facility: OTHER | Age: 22
End: 2022-11-14

## 2022-11-14 NOTE — TELEPHONE ENCOUNTER
Per patient's phone call, she would like to have a Rx for Cryselle-28 0 3-30 MG-MCG sent to [facility] on [address] [facility] in [facility]  Per patient she last took this in May/Anyi

## 2022-11-29 ENCOUNTER — TELEPHONE (OUTPATIENT)
Dept: OBGYN CLINIC | Facility: CLINIC | Age: 22
End: 2022-11-29

## 2022-11-29 DIAGNOSIS — Z30.011 ENCOUNTER FOR INITIAL PRESCRIPTION OF CONTRACEPTIVE PILLS: Primary | ICD-10-CM

## 2022-11-29 RX ORDER — NORGESTREL-ETHINYL ESTRADIOL 0.3-0.03MG
1 TABLET ORAL DAILY
Qty: 84 TABLET | Refills: 0 | Status: SHIPPED | OUTPATIENT
Start: 2022-11-29 | End: 2023-02-21

## 2022-11-29 NOTE — TELEPHONE ENCOUNTER
RX sent  Patient needs to wait until next normal period and negative pregnancy test to start her OCP  Needs backup protection x 2 weeks to prevent pregnancy

## 2022-11-29 NOTE — TELEPHONE ENCOUNTER
Pt  Would like to go back on OCPs  She stopped them about 7 mos ago  They were previously prescribed by PCP, who advised her to reach out to us for a new rx  Yearly scheduled with Brandon Quiñones in January        Cryselle-28 0 3-30 MG-MCG per tablet

## 2023-01-09 ENCOUNTER — ANNUAL EXAM (OUTPATIENT)
Dept: OBGYN CLINIC | Facility: CLINIC | Age: 23
End: 2023-01-09

## 2023-01-09 VITALS
HEIGHT: 66 IN | DIASTOLIC BLOOD PRESSURE: 68 MMHG | WEIGHT: 172 LBS | BODY MASS INDEX: 27.64 KG/M2 | SYSTOLIC BLOOD PRESSURE: 118 MMHG

## 2023-01-09 DIAGNOSIS — Z01.419 WELL WOMAN EXAM WITH ROUTINE GYNECOLOGICAL EXAM: Primary | ICD-10-CM

## 2023-01-09 DIAGNOSIS — Z12.4 ENCOUNTER FOR SCREENING FOR MALIGNANT NEOPLASM OF CERVIX: ICD-10-CM

## 2023-01-09 DIAGNOSIS — Z11.51 SCREENING FOR HPV (HUMAN PAPILLOMAVIRUS): ICD-10-CM

## 2023-01-09 PROBLEM — Z00.00 ANNUAL PHYSICAL EXAM: Status: RESOLVED | Noted: 2022-06-20 | Resolved: 2023-01-09

## 2023-01-09 PROBLEM — F07.81 POSTCONCUSSION SYNDROME: Status: ACTIVE | Noted: 2017-01-11

## 2023-01-09 PROBLEM — N89.8 VAGINAL IRRITATION: Status: RESOLVED | Noted: 2022-01-05 | Resolved: 2023-01-09

## 2023-01-09 NOTE — PROGRESS NOTES
ASSESSMENT & PLAN:   Diagnoses and all orders for this visit:    Well woman exam with routine gynecological exam  -     Liquid-based pap, screening    Encounter for screening for malignant neoplasm of cervix  -     Liquid-based pap, screening    Screening for HPV (human papillomavirus)  -     Liquid-based pap, screening      The following were reviewed in today's visit: ASCCP guidelines, Gardisil vaccination, STD testing breast self exam, STD testing, exercise and healthy diet  Patient to return to office in yearly for annual exam      All questions have been answered to her satisfaction  CC:  Annual Gynecologic Examination  Chief Complaint   Patient presents with   • Gynecologic Exam     Pt is here for her annual exam and first pap smear  She is doing well, no concerns  HPI: Darci Vargas is a 25 y o  Celia Snide who presents for annual gynecologic examination  She has the following concerns:  None today  Patient had abnormal PAP in 2019 which was done due to post coital bleeding  Results of that PAP were ASCUS, otherHRHPV positive  She received her full HPV vaccine series  Health Maintenance:    Exercise: frequently  Breast exams/breast awareness: yes  Diet: well balanced diet      Past Medical History:   Diagnosis Date   • Endometriosis        Past Surgical History:   Procedure Laterality Date   • LEG TENDON SURGERY     • WISDOM TOOTH EXTRACTION         Past OB/Gyn History:  Period Cycle (Days): 30  Period Duration (Days): 4-5  Period Pattern: Regular  Menstrual Flow: Moderate, Light  Dysmenorrhea: NonePatient's last menstrual period was 01/03/2023  Last Pap: 2020 : ASCUS, +otherHRHPV, HPV16/18 neg (had PCB)  History of abnormal Pap smear: yes  HPV vaccine completed: yes    Patient is currently sexually active     STD testing: no  Current contraception: OCP (estrogen/progesterone)      Family History  Family History   Problem Relation Age of Onset   • No Known Problems Mother • No Known Problems Father    • No Known Problems Brother    • No Known Problems Maternal Grandmother    • No Known Problems Maternal Grandfather    • Diabetes Paternal Grandmother    • Lung disease Paternal Grandmother        Family history of uterine or ovarian cancer: no  Family history of breast cancer: no  Family history of colon cancer: no    Social History:  Social History     Socioeconomic History   • Marital status: Single     Spouse name: Not on file   • Number of children: Not on file   • Years of education: Not on file   • Highest education level: Not on file   Occupational History   • Not on file   Tobacco Use   • Smoking status: Never   • Smokeless tobacco: Never   Vaping Use   • Vaping Use: Never used   Substance and Sexual Activity   • Alcohol use: Yes     Comment: social    • Drug use: Yes     Types: Marijuana     Comment: social    • Sexual activity: Not Currently     Partners: Male     Birth control/protection: None   Other Topics Concern   • Not on file   Social History Narrative   • Not on file     Social Determinants of Health     Financial Resource Strain: Not on file   Food Insecurity: Not on file   Transportation Needs: Not on file   Physical Activity: Not on file   Stress: Not on file   Social Connections: Not on file   Intimate Partner Violence: Not on file   Housing Stability: Not on file     Domestic violence screen: negative    Allergies: Allergies   Allergen Reactions   • Apple - Food Allergy    • Other Other (See Comments)     Per patient, itchy eyes, stuffy nose & sneezing --Cats   • Prunus Persica        Medications:    Current Outpatient Medications:   •  norgestrel-ethinyl estradiol (Cryselle-28) 0 3 mg-30 mcg per tablet, Take 1 tablet by mouth daily, Disp: 84 tablet, Rfl: 0    Review of Systems:  Review of Systems   Constitutional: Negative for chills, fever and unexpected weight change  Respiratory: Negative for shortness of breath      Cardiovascular: Negative for chest pain    Gastrointestinal: Negative for abdominal pain, diarrhea, nausea and vomiting  Skin: Negative for rash  Psychiatric/Behavioral: Negative for dysphoric mood  The patient is not nervous/anxious  Physical Exam:  /68   Ht 5' 5 5" (1 664 m)   Wt 78 kg (172 lb)   LMP 01/03/2023   BMI 28 19 kg/m²    Physical Exam  Constitutional:       Appearance: Normal appearance  Genitourinary:      Vulva and urethral meatus normal       No lesions in the vagina  Right Labia: No rash or lesions  Left Labia: No lesions or rash  No vaginal discharge, erythema or bleeding  Right Adnexa: not tender and no mass present  Left Adnexa: not tender and no mass present  Cervical lesion (3 small (1-2mm) erythematous lesions on posterior aspect of cervix) present  No cervical discharge or friability  Uterus is not tender  Breasts:     Breasts are symmetrical       Right: No mass or skin change  Left: No mass or skin change  HENT:      Head: Normocephalic and atraumatic  Cardiovascular:      Rate and Rhythm: Normal rate and regular rhythm  Heart sounds: Normal heart sounds  No murmur heard  No friction rub  No gallop  Pulmonary:      Effort: Pulmonary effort is normal       Breath sounds: Normal breath sounds  No wheezing, rhonchi or rales  Abdominal:      General: Abdomen is flat  There is no distension  Palpations: Abdomen is soft  Tenderness: There is no abdominal tenderness  Musculoskeletal:      Cervical back: Neck supple  Lymphadenopathy:      Upper Body:      Right upper body: No axillary adenopathy  Left upper body: No axillary adenopathy  Neurological:      General: No focal deficit present  Mental Status: She is alert  Skin:     General: Skin is warm and dry  Psychiatric:         Mood and Affect: Mood normal          Behavior: Behavior normal    Vitals reviewed

## 2023-01-17 LAB
LAB AP GYN PRIMARY INTERPRETATION: NORMAL
Lab: NORMAL

## 2023-02-07 ENCOUNTER — OFFICE VISIT (OUTPATIENT)
Dept: FAMILY MEDICINE CLINIC | Facility: CLINIC | Age: 23
End: 2023-02-07

## 2023-02-07 ENCOUNTER — APPOINTMENT (OUTPATIENT)
Dept: RADIOLOGY | Age: 23
End: 2023-02-07

## 2023-02-07 VITALS
TEMPERATURE: 96.4 F | RESPIRATION RATE: 14 BRPM | WEIGHT: 170.2 LBS | HEART RATE: 96 BPM | SYSTOLIC BLOOD PRESSURE: 122 MMHG | DIASTOLIC BLOOD PRESSURE: 84 MMHG | OXYGEN SATURATION: 98 % | BODY MASS INDEX: 27.35 KG/M2 | HEIGHT: 66 IN

## 2023-02-07 DIAGNOSIS — G89.29 CHRONIC RIGHT-SIDED LOW BACK PAIN WITHOUT SCIATICA: Primary | ICD-10-CM

## 2023-02-07 DIAGNOSIS — M54.50 CHRONIC RIGHT-SIDED LOW BACK PAIN WITHOUT SCIATICA: ICD-10-CM

## 2023-02-07 DIAGNOSIS — G89.29 CHRONIC RIGHT-SIDED THORACIC BACK PAIN: ICD-10-CM

## 2023-02-07 DIAGNOSIS — M54.6 CHRONIC RIGHT-SIDED THORACIC BACK PAIN: ICD-10-CM

## 2023-02-07 DIAGNOSIS — G89.29 CHRONIC RIGHT-SIDED LOW BACK PAIN WITHOUT SCIATICA: ICD-10-CM

## 2023-02-07 DIAGNOSIS — M54.50 CHRONIC RIGHT-SIDED LOW BACK PAIN WITHOUT SCIATICA: Primary | ICD-10-CM

## 2023-02-07 NOTE — PROGRESS NOTES
FAMILY PRACTICE OFFICE VISIT       NAME: Luis Schwartz  AGE: 25 y o  SEX: female       : 2000        MRN: [id number]    DATE: 2023  TIME: 4:26 PM    Assessment and Plan   1  Chronic right-sided low back pain without sciatica  Comments:  Pt stable on exam  Treating with heat to the regions, OTC Tylenol PRN, stretching, PT referral, etc   Xrays ordered  Orders:  -     XR spine thoracic 3 vw; Future; Expected date: 2023  -     XR spine lumbar 2 or 3 views injury; Future; Expected date: 2023  -     Ambulatory Referral to Physical Therapy; Future    2  Chronic right-sided thoracic back pain  Comments:  As above  Pt declines PRN muscle relaxant  Orders:  -     XR spine thoracic 3 vw; Future; Expected date: 2023  -     XR spine lumbar 2 or 3 views injury; Future; Expected date: 2023  -     Ambulatory Referral to Physical Therapy; Future         There are no Patient Instructions on file for this visit  Chief Complaint     Chief Complaint   Patient presents with   • Back Pain     Patient being seen for right side back pain x 8-9 months        History of Present Illness   Luis Schwartz is a 25y o -year-old female who presents with the c/o right upper and lower back pain for several months  Continues to exercise, lift weights  No hx of back surgery  No fecal / urinary incontinence  No true sciatica - will feel in the left inner thigh occasionally  Pt is not pregnant at this time  [place] PDMP was checked  Review of Systems   Review of Systems    Active Problem List     Patient Active Problem List   Diagnosis   • ASCUS with positive high risk HPV cervical   • Chronic pain due to trauma   • Myalgia   • Overweight (BMI 25 0-29  9)   • Postconcussion syndrome   • Late effect of intracranial injury         Past Medical History:  Past Medical History:   Diagnosis Date   • Endometriosis        Past Surgical History:  Past Surgical History:   Procedure Laterality Date   • LEG TENDON SURGERY     • WISDOM TOOTH EXTRACTION         Family History:  Family History   Problem Relation Age of Onset   • No Known Problems Mother    • No Known Problems Father    • No Known Problems Brother    • No Known Problems Maternal Grandmother    • No Known Problems Maternal Grandfather    • Diabetes Paternal Grandmother    • Lung disease Paternal Grandmother        Social History:  Social History     Socioeconomic History   • Marital status: Single     Spouse name: Not on file   • Number of children: Not on file   • Years of education: Not on file   • Highest education level: Not on file   Occupational History   • Not on file   Tobacco Use   • Smoking status: Never   • Smokeless tobacco: Never   Vaping Use   • Vaping Use: Never used   Substance and Sexual Activity   • Alcohol use: Yes     Comment: social    • Drug use: Not Currently     Types: Marijuana   • Sexual activity: Not Currently     Partners: Male     Birth control/protection: None   Other Topics Concern   • Not on file   Social History Narrative   • Not on file     Social Determinants of Health     Financial Resource Strain: Not on file   Food Insecurity: Not on file   Transportation Needs: Not on file   Physical Activity: Not on file   Stress: Not on file   Social Connections: Not on file   Intimate Partner Violence: Not on file   Housing Stability: Not on file       Objective     Vitals:    02/07/23 1604   BP: 122/84   Pulse: 96   Resp: 14   Temp: (!) 96 4 °F (35 8 °C)   SpO2: 98%     Wt Readings from Last 3 Encounters:   02/07/23 77 2 kg (170 lb 3 2 oz)   01/09/23 78 kg (172 lb)   06/28/22 77 1 kg (170 lb)       Physical Exam  Vitals and nursing note reviewed  Constitutional:       General: She is not in acute distress  Appearance: Normal appearance  She is not ill-appearing, toxic-appearing or diaphoretic  HENT:      Head: Normocephalic and atraumatic  Eyes:      General: No scleral icterus  Conjunctiva/sclera: Conjunctivae normal    Musculoskeletal:        Back:    Neurological:      Mental Status: She is alert and oriented to person, place, and time  Sensory: Sensation is intact  No sensory deficit  Motor: Motor function is intact  No weakness  Deep Tendon Reflexes:      Reflex Scores:       Tricep reflexes are 2+ on the right side and 2+ on the left side  Bicep reflexes are 2+ on the right side and 2+ on the left side  Brachioradialis reflexes are 2+ on the right side and 2+ on the left side  Patellar reflexes are 2+ on the right side and 2+ on the left side  Achilles reflexes are 2+ on the right side and 2+ on the left side  Comments: MS +5/5 bilateral UE and LE  Psychiatric:         Mood and Affect: Mood normal          Behavior: Behavior normal          Thought Content:  Thought content normal          Judgment: Judgment normal          Pertinent Laboratory/Diagnostic Studies:  Lab Results   Component Value Date    BUN 15 03/01/2022    CREATININE 0 76 03/01/2022    CALCIUM 9 2 03/01/2022    K 3 9 03/01/2022    CO2 26 03/01/2022     03/01/2022     Lab Results   Component Value Date    ALT 26 03/01/2022    AST 28 03/01/2022    ALKPHOS 55 03/01/2022       Lab Results   Component Value Date    WBC 6 46 03/01/2022    HGB 15 2 03/01/2022    HCT 44 5 03/01/2022    MCV 96 03/01/2022     03/01/2022       No results found for: TSH    No results found for: CHOL  No results found for: TRIG  No results found for: HDL  No results found for: LDLCALC  No results found for: HGBA1C    Results for orders placed or performed in visit on 01/09/23   Liquid-based pap, screening   Result Value Ref Range    Case Report       Gynecologic Cytology Report                       Case: [id number]                                  Authorizing Provider:  Cynthia Lopez PA-C        Collected:           01/09/2023 5315              Ordering Location:     [facility]  Received:            01/09/2023 3561                                     Health                                                                       First Screen:          Lauren Shoulders, CT                                                    Rescreen:              Nyasia Bernabe                                                                  Specimen:    LIQUID-BASED PAP, SCREENING, Cervix, Endocervical                                          Primary Interpretation Negative for intraepithelial lesion or malignancy     Specimen Adequacy       Satisfactory for evaluation  Endocervical/transformation zone component present  Additional Information       [facility]'s FDA approved ,  and ThinPrep Imaging Duo System are utilized with strict adherence to the 's instruction manual to prepare gynecologic and non-gynecologic cytology specimens for the production of ThinPrep slides as well as for gynecologic ThinPrep imaging  These processes have been validated by our laboratory and/or by the   The Pap test is not a diagnostic procedure and should not be used as the sole means to detect cervical cancer  It is only a screening procedure to aid in the detection of cervical cancer and its precursors  Both false-negative and false-positive results have been experienced  Your patient's test result should be interpreted in this context together with the history and clinical findings           Orders Placed This Encounter   Procedures   • XR spine thoracic 3 vw   • XR spine lumbar 2 or 3 views injury   • Ambulatory Referral to Physical Therapy       ALLERGIES:  Allergies   Allergen Reactions   • Apple - Food Allergy    • Other Other (See Comments)     Per patient, itchy eyes, stuffy nose & sneezing --Cats   • Prunus Persica        Current Medications     Current Outpatient Medications   Medication Sig Dispense Refill   • norgestrel-ethinyl estradiol (Cryselle-28) 0 3 mg-30 mcg per tablet Take 1 tablet by mouth daily 84 tablet 0     No current facility-administered medications for this visit           Health Maintenance     Health Maintenance   Topic Date Due   • COVID-19 Vaccine (3 - Booster for Pfizer series) 07/05/2021   • Influenza Vaccine (1) 09/01/2022   • Chlamydia Screening  01/05/2023   • BMI: Followup Plan  06/20/2023   • Annual Physical  01/09/2024   • Depression Screening  02/07/2024   • BMI: Adult  02/07/2024   • Cervical Cancer Screening  01/09/2026   • DTaP,Tdap,and Td Vaccines (8 - Td or Tdap) 06/20/2032   • HIV Screening  Completed   • Hepatitis C Screening  Completed   • HIB Vaccine  Completed   • IPV Vaccine  Completed   • Hepatitis A Vaccine  Completed   • Meningococcal ACWY Vaccine  Completed   • HPV Vaccine  Completed   • Pneumococcal Vaccine: Pediatrics (0 to 5 Years) and At-Risk Patients (6 to 59 Years)  Aged [employer] History   Administered Date(s) Administered   • COVID-19 PFIZER VACCINE 0 3 ML IM 02/19/2021, 05/10/2021   • DTaP / HiB 2001   • DTaP / Avonne Peppers / IPV 2001, 2001   • DTaP 5 06/27/2002, 05/09/2006   • HPV9 06/08/2021, 06/08/2021, 07/27/2021, 07/27/2021, 12/20/2021   • Hep A, adult 05/10/2007, 05/12/2008   • Hep B, adult 2001, 01/02/2002, 06/27/2002   • Hib (PRP-OMP) 03/27/2002   • INFLUENZA 2001, 11/20/2010, 11/03/2014, 02/19/2016, 01/30/2018   • IPV 06/27/2002, 05/09/2006   • Influenza, seasonal, injectable 11/23/2013   • MMR 03/27/2002, 04/25/2005   • Meningococcal MCV4P 10/18/2017   • Meningococcal, Unknown Serogroups 05/14/2012   • Pneumococcal Conjugate PCV 7 2001, 2001, 2001, 03/27/2002   • Tdap 05/14/2012, 06/20/2022   • Tuberculin Skin Test-PPD Intradermal 06/20/2022   • Varicella 03/27/2002, 05/10/2007          Wojciech Mckay DO

## 2023-03-06 DIAGNOSIS — Z30.011 ENCOUNTER FOR INITIAL PRESCRIPTION OF CONTRACEPTIVE PILLS: ICD-10-CM

## 2023-03-06 RX ORDER — NORGESTREL-ETHINYL ESTRADIOL 0.3-0.03MG
1 TABLET ORAL DAILY
Qty: 84 TABLET | Refills: 0 | Status: SHIPPED | OUTPATIENT
Start: 2023-03-06 | End: 2023-05-29

## 2023-03-06 NOTE — TELEPHONE ENCOUNTER
Pt is calling for her BC refill  pt is at school and would like the refill sent to the [facility] now on file ([facility])

## 2023-03-14 ENCOUNTER — OFFICE VISIT (OUTPATIENT)
Dept: FAMILY MEDICINE CLINIC | Facility: CLINIC | Age: 23
End: 2023-03-14

## 2023-03-14 VITALS
RESPIRATION RATE: 16 BRPM | TEMPERATURE: 96.5 F | SYSTOLIC BLOOD PRESSURE: 114 MMHG | DIASTOLIC BLOOD PRESSURE: 82 MMHG | BODY MASS INDEX: 27.51 KG/M2 | OXYGEN SATURATION: 99 % | HEART RATE: 96 BPM | HEIGHT: 66 IN | WEIGHT: 171.2 LBS

## 2023-03-14 DIAGNOSIS — R05.1 ACUTE COUGH: Primary | ICD-10-CM

## 2023-03-14 DIAGNOSIS — R09.81 SINUS CONGESTION: ICD-10-CM

## 2023-03-14 RX ORDER — AZITHROMYCIN 250 MG/1
TABLET, FILM COATED ORAL
Qty: 6 TABLET | Refills: 0 | Status: SHIPPED | OUTPATIENT
Start: 2023-03-14 | End: 2023-03-19

## 2023-03-14 RX ORDER — ALBUTEROL SULFATE 90 UG/1
2 AEROSOL, METERED RESPIRATORY (INHALATION) EVERY 6 HOURS PRN
Qty: 18 G | Refills: 0 | Status: SHIPPED | OUTPATIENT
Start: 2023-03-14

## 2023-03-14 NOTE — PROGRESS NOTES
FAMILY PRACTICE OFFICE VISIT       NAME: Gagandeep Brantley  AGE: 25 y o  SEX: female       : 2000        MRN: [id number]    DATE: 3/14/2023  TIME: 12:17 PM    Assessment and Plan   1  Acute cough  Comments:  Pt stable on exam   Treating with Azithromycin, Albuterol PRN, warm salt water gargles, OTC Cough/Cold Preps PRN, rest, and good PO hydration  Orders:  -     azithromycin (ZITHROMAX) 250 mg tablet; Take 2 tablets by mouth on day #1, then 1 tab daily for four more days  -     albuterol (Ventolin HFA) 90 mcg/act inhaler; Inhale 2 puffs every 6 (six) hours as needed for wheezing    2  Sinus congestion  Comments:  As above  Orders:  -     azithromycin (ZITHROMAX) 250 mg tablet; Take 2 tablets by mouth on day #1, then 1 tab daily for four more days  There are no Patient Instructions on file for this visit  Chief Complaint     Chief Complaint   Patient presents with   • Cough     Patient being seen for cough x 1 week   • Nasal Congestion     Patient being seen for congestion x 1 week   • Generalized Body Aches     Patient being seen for body aches x 1 week       History of Present Illness   Gagandeep Brantley is a 25y o -year-old female who presents today with the c/o feeling sick x 1 week+  Tested negative for COV-19 at home  Had been to an Urgent Care - treated with a burst of Prednisone, and an undisclosed antbx x 5 days  Now with ongoing cough, sinus discharge, and chest congestion  Pt is not pregnant at this time  Review of Systems   Review of Systems   Constitutional: Negative for activity change and fever  HENT: Positive for congestion and rhinorrhea  Negative for sore throat  Respiratory: Positive for cough  +Chest congestion  Active Problem List     Patient Active Problem List   Diagnosis   • ASCUS with positive high risk HPV cervical   • Chronic pain due to trauma   • Myalgia   • Overweight (BMI 25 0-29  9)   • Postconcussion syndrome   • Late effect of intracranial injury         Past Medical History:  Past Medical History:   Diagnosis Date   • Endometriosis        Past Surgical History:  Past Surgical History:   Procedure Laterality Date   • LEG TENDON SURGERY     • WISDOM TOOTH EXTRACTION         Family History:  Family History   Problem Relation Age of Onset   • No Known Problems Mother    • No Known Problems Father    • No Known Problems Brother    • No Known Problems Maternal Grandmother    • No Known Problems Maternal Grandfather    • Diabetes Paternal Grandmother    • Lung disease Paternal Grandmother        Social History:  Social History     Socioeconomic History   • Marital status: Single     Spouse name: Not on file   • Number of children: Not on file   • Years of education: Not on file   • Highest education level: Not on file   Occupational History   • Not on file   Tobacco Use   • Smoking status: Never   • Smokeless tobacco: Never   Vaping Use   • Vaping Use: Never used   Substance and Sexual Activity   • Alcohol use: Yes     Comment: social    • Drug use: Not Currently     Types: Marijuana   • Sexual activity: Not Currently     Partners: Male     Birth control/protection: None   Other Topics Concern   • Not on file   Social History Narrative   • Not on file     Social Determinants of Health     Financial Resource Strain: Not on file   Food Insecurity: Not on file   Transportation Needs: Not on file   Physical Activity: Not on file   Stress: Not on file   Social Connections: Not on file   Intimate Partner Violence: Not on file   Housing Stability: Not on file       Objective     Vitals:    03/14/23 1150   BP: 114/82   Pulse: 96   Resp: 16   Temp: (!) 96 5 °F (35 8 °C)   SpO2: 99%     Wt Readings from Last 3 Encounters:   03/14/23 77 7 kg (171 lb 3 2 oz)   02/07/23 77 2 kg (170 lb 3 2 oz)   01/09/23 78 kg (172 lb)       Physical Exam  Vitals and nursing note reviewed  Constitutional:       General: She is not in acute distress  Appearance: Normal appearance  She is not ill-appearing, toxic-appearing or diaphoretic  HENT:      Head: Normocephalic and atraumatic  Right Ear: Tympanic membrane, ear canal and external ear normal       Left Ear: Tympanic membrane, ear canal and external ear normal       Nose: Congestion present  Right Sinus: No maxillary sinus tenderness or frontal sinus tenderness  Left Sinus: No maxillary sinus tenderness or frontal sinus tenderness  Eyes:      General: No scleral icterus  Conjunctiva/sclera: Conjunctivae normal    Cardiovascular:      Rate and Rhythm: Normal rate and regular rhythm  Heart sounds: Normal heart sounds  No murmur heard  No friction rub  No gallop  Pulmonary:      Effort: Pulmonary effort is normal  No respiratory distress  Breath sounds: Normal breath sounds  No stridor  No wheezing, rhonchi or rales  Musculoskeletal:      Cervical back: Normal range of motion and neck supple  No rigidity or tenderness  Lymphadenopathy:      Cervical: No cervical adenopathy  Neurological:      Mental Status: She is alert and oriented to person, place, and time  Psychiatric:         Mood and Affect: Mood normal          Behavior: Behavior normal          Thought Content:  Thought content normal          Judgment: Judgment normal          Pertinent Laboratory/Diagnostic Studies:  Lab Results   Component Value Date    BUN 15 03/01/2022    CREATININE 0 76 03/01/2022    CALCIUM 9 2 03/01/2022    K 3 9 03/01/2022    CO2 26 03/01/2022     03/01/2022     Lab Results   Component Value Date    ALT 26 03/01/2022    AST 28 03/01/2022    ALKPHOS 55 03/01/2022       Lab Results   Component Value Date    WBC 6 46 03/01/2022    HGB 15 2 03/01/2022    HCT 44 5 03/01/2022    MCV 96 03/01/2022     03/01/2022       No results found for: TSH    No results found for: CHOL  No results found for: TRIG  No results found for: HDL  No results found for: LDLCALC  No results found for: HGBA1C    Results for orders placed or performed in visit on 01/09/23   Liquid-based pap, screening   Result Value Ref Range    Case Report       Gynecologic Cytology Report                       Case: [id number]                                  Authorizing Provider:  Doris Bradshaw PA-C        Collected:           01/09/2023 [id number]              Ordering Location:     [facility]  Received:            01/09/2023 [id number]                                     Health                                                                       First Screen:          Candance Flow, CT                                                    Rescreen:              Huy Membreno                                                                  Specimen:    LIQUID-BASED PAP, SCREENING, Cervix, Endocervical                                          Primary Interpretation Negative for intraepithelial lesion or malignancy     Specimen Adequacy       Satisfactory for evaluation  Endocervical/transformation zone component present  Additional Information       [employer]'s FDA approved ,  and ThinPrep Imaging Duo System are utilized with strict adherence to the 's instruction manual to prepare gynecologic and non-gynecologic cytology specimens for the production of ThinPrep slides as well as for gynecologic ThinPrep imaging  These processes have been validated by our laboratory and/or by the   The Pap test is not a diagnostic procedure and should not be used as the sole means to detect cervical cancer  It is only a screening procedure to aid in the detection of cervical cancer and its precursors  Both false-negative and false-positive results have been experienced  Your patient's test result should be interpreted in this context together with the history and clinical findings  No orders of the defined types were placed in this encounter        ALLERGIES:  Allergies   Allergen Reactions • Apple - Food Allergy    • Other Other (See Comments)     Per patient, itchy eyes, stuffy nose & sneezing --Cats   • Prunus Persica        Current Medications     Current Outpatient Medications   Medication Sig Dispense Refill   • albuterol (Ventolin HFA) 90 mcg/act inhaler Inhale 2 puffs every 6 (six) hours as needed for wheezing 18 g 0   • azithromycin (ZITHROMAX) 250 mg tablet Take 2 tablets by mouth on day #1, then 1 tab daily for four more days  6 tablet 0   • norgestrel-ethinyl estradiol (Cryselle-28) 0 3 mg-30 mcg per tablet Take 1 tablet by mouth daily 84 tablet 0     No current facility-administered medications for this visit           Health Maintenance     Health Maintenance   Topic Date Due   • COVID-19 Vaccine (3 - Booster for Pfizer series) 07/05/2021   • Influenza Vaccine (1) 09/01/2022   • Chlamydia Screening  01/05/2023   • BMI: Followup Plan  06/20/2023   • Annual Physical  01/09/2024   • Depression Screening  02/07/2024   • BMI: Adult  03/14/2024   • Cervical Cancer Screening  01/09/2026   • DTaP,Tdap,and Td Vaccines (8 - Td or Tdap) 06/20/2032   • HIV Screening  Completed   • Hepatitis C Screening  Completed   • HIB Vaccine  Completed   • IPV Vaccine  Completed   • Hepatitis A Vaccine  Completed   • Meningococcal ACWY Vaccine  Completed   • HPV Vaccine  Completed   • Pneumococcal Vaccine: Pediatrics (0 to 5 Years) and At-Risk Patients (6 to 59 Years)  Aged [employer] History   Administered Date(s) Administered   • COVID-19 PFIZER VACCINE 0 3 ML IM 02/19/2021, 05/10/2021   • DTaP / HiB 2001   • DTaP / Candance Divine / IPV 2001, 2001   • DTaP 5 06/27/2002, 05/09/2006   • HPV9 06/08/2021, 06/08/2021, 07/27/2021, 07/27/2021, 12/20/2021   • Hep A, adult 05/10/2007, 05/12/2008   • Hep B, adult 2001, 01/02/2002, 06/27/2002   • Hib (PRP-OMP) 03/27/2002   • INFLUENZA 2001, 11/20/2010, 11/03/2014, 02/19/2016, 01/30/2018   • IPV 06/27/2002, 05/09/2006   • Influenza, seasonal, injectable 11/23/2013   • MMR 03/27/2002, 04/25/2005   • Meningococcal MCV4P 10/18/2017   • Meningococcal, Unknown Serogroups 05/14/2012   • Pneumococcal Conjugate PCV 7 2001, 2001, 2001, 03/27/2002   • Tdap 05/14/2012, 06/20/2022   • Tuberculin Skin Test-PPD Intradermal 06/20/2022   • Varicella 03/27/2002, 05/10/2007          Wojciech Mckay DO

## 2023-03-21 ENCOUNTER — TELEPHONE (OUTPATIENT)
Dept: FAMILY MEDICINE CLINIC | Facility: CLINIC | Age: 23
End: 2023-03-21

## 2023-03-21 DIAGNOSIS — J01.01 ACUTE RECURRENT MAXILLARY SINUSITIS: Primary | ICD-10-CM

## 2023-03-21 RX ORDER — AMOXICILLIN AND CLAVULANATE POTASSIUM 875; 125 MG/1; MG/1
1 TABLET, FILM COATED ORAL 2 TIMES DAILY WITH MEALS
Qty: 20 TABLET | Refills: 0 | Status: SHIPPED | OUTPATIENT
Start: 2023-03-21 | End: 2023-03-31

## 2023-03-21 NOTE — TELEPHONE ENCOUNTER
Patient called and was asking if you would be able to call something else into the pharmacy for her  The patient stated that she still is not feeling good from her visit with you on 3/14/2023  The patient stated that she has gotten worse since when you saw her  Please advise        Pharmacy:     [facility] [address] [place] [address]  Phone: [phone] Fax: [phone]

## 2023-04-25 ENCOUNTER — TELEPHONE (OUTPATIENT)
Dept: OTHER | Facility: OTHER | Age: 23
End: 2023-04-25

## 2023-04-25 NOTE — TELEPHONE ENCOUNTER
Call from patient advising when she was in last she had asked Dr Javed Chicas if he could write her a letter to have her deep tissue massages covered under her [address] [place] [address]  Patient is just following up because she has not heard back regarding that  Please return her call

## 2023-05-05 DIAGNOSIS — Z30.011 ENCOUNTER FOR INITIAL PRESCRIPTION OF CONTRACEPTIVE PILLS: ICD-10-CM

## 2023-05-05 RX ORDER — NORGESTREL-ETHINYL ESTRADIOL 0.3-0.03MG
1 TABLET ORAL DAILY
Qty: 84 TABLET | Refills: 0 | Status: SHIPPED | OUTPATIENT
Start: 2023-05-05 | End: 2023-07-28

## 2023-05-30 ENCOUNTER — TELEPHONE (OUTPATIENT)
Dept: DERMATOLOGY | Facility: CLINIC | Age: 23
End: 2023-05-30

## 2023-06-02 ENCOUNTER — OFFICE VISIT (OUTPATIENT)
Age: 23
End: 2023-06-02
Payer: COMMERCIAL

## 2023-06-02 VITALS
HEART RATE: 100 BPM | DIASTOLIC BLOOD PRESSURE: 89 MMHG | BODY MASS INDEX: 27.98 KG/M2 | SYSTOLIC BLOOD PRESSURE: 105 MMHG | HEIGHT: 66 IN

## 2023-06-02 DIAGNOSIS — M79.18 DIFFUSE MYOFASCIAL PAIN SYNDROME: ICD-10-CM

## 2023-06-02 DIAGNOSIS — M99.03 SEGMENTAL DYSFUNCTION OF LUMBAR REGION: ICD-10-CM

## 2023-06-02 DIAGNOSIS — M99.02 SEGMENTAL DYSFUNCTION OF THORACIC REGION: ICD-10-CM

## 2023-06-02 DIAGNOSIS — M99.04 SEGMENTAL DYSFUNCTION OF SACRAL REGION: Primary | ICD-10-CM

## 2023-06-02 DIAGNOSIS — M99.01 SEGMENTAL DYSFUNCTION OF CERVICAL REGION: ICD-10-CM

## 2023-06-02 PROCEDURE — 98941 CHIROPRACT MANJ 3-4 REGIONS: CPT | Performed by: CHIROPRACTOR

## 2023-06-02 PROCEDURE — 97110 THERAPEUTIC EXERCISES: CPT | Performed by: CHIROPRACTOR

## 2023-06-02 PROCEDURE — 99203 OFFICE O/P NEW LOW 30 MIN: CPT | Performed by: CHIROPRACTOR

## 2023-06-02 NOTE — PROGRESS NOTES
Diagnoses and all orders for this visit:    Segmental dysfunction of sacral region    Diffuse myofascial pain syndrome    Segmental dysfunction of lumbar region    Segmental dysfunction of thoracic region    Segmental dysfunction of cervical region    No red flags, radiculopathy or neurologic deficit appreciated clinically  Pt's symptoms and exam findings consistent with mechanical neck/back pain secondary to repetitive st/sp injury, exacerbated by myofascial pain syndrome  Discussed possible metabolic and inflammatory factors that may be associated with underlying issue  Pt responded well to stretches and manual mobilization of the affected spinal and myofascial tissues with increased ROM; trial of conservative tx recommended consisting of stretching, ther-ex, graded mobilization/manipulation of the affected spinal/myofascial tissues, postural/ergonomic education and take home stretches/exercises  If symptoms fail to improve with short trial of conservative care, appropriate imaging and referral will be coordinated  Spent greater than 30 min c pt discussing hx, pe, ddx, tx options and reviewing notes/imaging    TREATMENT:  Fear avoidance behavior discussion, encouraged and reassured pt that natural course of condition is to improve over time with adherence to tx plan and home care strategies  Home care recommendations: avoid bed rest, walk (but avoid trails and uneven surfaces), gradual return to activity to tolerance (avoid anything that peripheralizes symptoms), ice 20 min on/off, watch for ice burn, call if symptoms peripheralize, worsen, or neurologic deficit progresses   Ther-ex: IASTM - discussed post procedure soreness and/or ecchymosis for up to 36 hrs, applied to affected mm hypertonicities; wall sarah, axial retraction, upper trap stretch, lev scap stretch, SCM stretch, side laying QL stretch, single knee to chest stretch, lat rows with t-band, lat pull down with t-band, abdominal bracing; greater than 15 min spent performing above mentioned ther-ex  Thoracic mobilization/manipulation: prone P-A mob, supine A-P manip; cervical mobilization/manipulation: diversified supine graded mobilization, cervical traction; R SIJ LAT and crespo drop table maneuver; lumbar supine diversified rotary mobilization B    HPI:  Chely Gottlieb is a 26 y o  female   Chief Complaint   Patient presents with   • Neck Pain     Pain score 8   • Back Pain     Lower right sided going down rt leg  Pain score 8     Pt presents for eval and tx of chronic intermittent neck/back pain  Pt reports hx of 4 concussions; whiplash associuated; c/s xrays and MRI demonstrate straightening and grade 1 listhesis C3/4/5  Pt underwent PT for neck 3 times without benefit  Pt lifts weights 5 days/wk    Neck Pain   This is a chronic problem  The current episode started more than 1 year ago  The problem occurs constantly  The problem has been waxing and waning  The pain is present in the midline and right side  The quality of the pain is described as aching and shooting  Pain scale: 3-8/10  The symptoms are aggravated by stress and position (crunches, mvmt of neck; palliative includes massage, chiro care, laying on neck support)  Worse during: worse end of day  Stiffness is present all day  Pertinent negatives include no chest pain, fever, headaches, numbness, trouble swallowing or weakness  Back Pain  This is a chronic problem  The current episode started more than 1 year ago  The problem occurs constantly  The pain is present in the lumbar spine, sacro-iliac, thoracic spine and gluteal  The quality of the pain is described as aching  The pain radiates to the right thigh and right knee  Pain scale: 3-8/10  Worse during: worse end of day  The symptoms are aggravated by bending and stress  Stiffness is present all day  Pertinent negatives include no chest pain, dysuria, fever, headaches, numbness, paresthesias or weakness       Past Medical History: Diagnosis Date   • Endometriosis       The following portions of the patient's history were reviewed and updated as appropriate: allergies, past family history, past medical history, past social history, past surgical history, and problem list   Review of Systems   Constitutional: Negative for fever and unexpected weight change  HENT: Negative for tinnitus and trouble swallowing  Cardiovascular: Negative for chest pain  Gastrointestinal: Negative for nausea  Genitourinary: Negative for dysuria  Musculoskeletal: Positive for back pain and neck pain  Neurological: Negative for dizziness, syncope, speech difficulty, weakness, light-headedness, numbness, headaches and paresthesias  Physical Exam  Eyes:      Extraocular Movements: Extraocular movements intact  Neck:        Comments: Pnful and limited in ext/rrot  Cardiovascular:      Pulses: Normal pulses  Abdominal:      General: There is no distension  Tenderness: There is no abdominal tenderness  Musculoskeletal:      Cervical back: Pain with movement and muscular tenderness present  Decreased range of motion  Thoracic back: Spasms and tenderness present  Decreased range of motion  Lumbar back: Spasms and tenderness present  Decreased range of motion  Negative right straight leg raise test and negative left straight leg raise test         Back:       Comments: Pnful and limited in Ext in R SIJ, Rlf    Lymphadenopathy:      Cervical: No cervical adenopathy  Skin:     General: Skin is warm and dry  Neurological:      Mental Status: She is alert and oriented to person, place, and time  Cranial Nerves: Cranial nerves 2-12 are intact  Sensory: Sensation is intact  Motor: Motor function is intact  Coordination: Coordination is intact  Gait: Gait is intact  Gait and tandem walk normal       Deep Tendon Reflexes: Reflexes normal  Babinski sign absent on the right side  Babinski sign absent on the left side  Reflex Scores:       Tricep reflexes are 2+ on the right side and 2+ on the left side  Bicep reflexes are 2+ on the right side and 2+ on the left side  Brachioradialis reflexes are 2+ on the right side and 2+ on the left side  Patellar reflexes are 2+ on the right side and 2+ on the left side  Achilles reflexes are 2+ on the right side and 2+ on the left side  Psychiatric:         Mood and Affect: Mood and affect normal          Behavior: Behavior normal        SOFT TISSUE ASSESSMENT: Hypertonicity and tenderness palpated B C5-T6 erector spinae, R upper traps, R lev scap, R SCM, R rhomboid, R QL, R glute med/min, R hip flexor JOINT RECTRICTIONS: C5-T6, L2-S1 and R SIJ ORTHO: Mervat unremarkable for centralization/peripheralization; max foraminal comp elicits local np R; shoulder depression elicits stiffness in R upper trap; brachial plexus tension test elicits no neural tension in R/L UE; cervical distraction palliative; iliac compression, thigh thrust and gaenslens elicit stiffness in R SIJ; tristin elicits tension in R hip mm    Return in about 2 weeks (around 6/16/2023) for Next scheduled follow up

## 2023-06-14 ENCOUNTER — PROCEDURE VISIT (OUTPATIENT)
Age: 23
End: 2023-06-14
Payer: COMMERCIAL

## 2023-06-14 VITALS
BODY MASS INDEX: 27.48 KG/M2 | HEIGHT: 66 IN | HEART RATE: 88 BPM | SYSTOLIC BLOOD PRESSURE: 113 MMHG | WEIGHT: 171 LBS | DIASTOLIC BLOOD PRESSURE: 73 MMHG

## 2023-06-14 DIAGNOSIS — M79.18 DIFFUSE MYOFASCIAL PAIN SYNDROME: ICD-10-CM

## 2023-06-14 DIAGNOSIS — M99.03 SEGMENTAL DYSFUNCTION OF LUMBAR REGION: ICD-10-CM

## 2023-06-14 DIAGNOSIS — M99.01 SEGMENTAL DYSFUNCTION OF CERVICAL REGION: ICD-10-CM

## 2023-06-14 DIAGNOSIS — M99.02 SEGMENTAL DYSFUNCTION OF THORACIC REGION: ICD-10-CM

## 2023-06-14 DIAGNOSIS — M99.04 SEGMENTAL DYSFUNCTION OF SACRAL REGION: Primary | ICD-10-CM

## 2023-06-14 PROCEDURE — 98941 CHIROPRACT MANJ 3-4 REGIONS: CPT | Performed by: CHIROPRACTOR

## 2023-06-14 PROCEDURE — 97110 THERAPEUTIC EXERCISES: CPT | Performed by: CHIROPRACTOR

## 2023-06-14 NOTE — PROGRESS NOTES
Diagnoses and all orders for this visit:    Segmental dysfunction of sacral region    Diffuse myofascial pain syndrome    Segmental dysfunction of lumbar region    Segmental dysfunction of thoracic region    Segmental dysfunction of cervical region    Pt slightly improved with reduced pain and increased ROM    TREATMENT:  Ther-ex: IASTM - discussed post procedure soreness and/or ecchymosis for up to 36 hrs, applied to affected mm hypertonicities; wall sarah, axial retraction, upper trap stretch, lev scap stretch, SCM stretch, side laying QL stretch, single knee to chest stretch, lat rows with t-band, lat pull down with t-band, abdominal bracing; greater than 15 min spent performing above mentioned ther-ex  Thoracic mobilization/manipulation: prone P-A mob, supine A-P manip; cervical mobilization/manipulation: diversified supine graded mobilization, cervical traction; R SIJ LAT and crespo drop table maneuver; lumbar supine diversified rotary mobilization B    HPI:  Verner Catchings is a 26 y o  female   Chief Complaint   Patient presents with   • Back Pain     Pain score 6   • Leg Pain     Right leg pain score 6/7   • Hip Pain     Right hip pain s core 7   • Neck Pain     Pain score 5     Pt presents for eval tx of chronic intermittent neck/back pain  Pt reports hx of 4 concussions; whiplash associuated; c/s xrays and MRI demonstrate straightening and grade 1 listhesis C3/4/5  Pt underwent PT for neck 3 times without benefit  Pt lifts weights 5 days/wk  6/14: Pt reports overall better but R hip more tight    Neck Pain   This is a chronic problem  The current episode started more than 1 year ago  The problem occurs constantly  The problem has been waxing and waning  The pain is present in the midline and right side  The quality of the pain is described as aching and shooting  Pain scale: 3-6/10   The symptoms are aggravated by stress and position (crunches, mvmt of neck; palliative includes massage, chiro care, laying on neck support)  Worse during: worse end of day  Stiffness is present all day  Associated symptoms include leg pain  Back Pain  This is a chronic problem  The current episode started more than 1 year ago  The problem occurs constantly  The pain is present in the lumbar spine, sacro-iliac, thoracic spine and gluteal  The quality of the pain is described as aching  The pain radiates to the right thigh and right knee  Pain scale: 3-6/10  Worse during: worse end of day  The symptoms are aggravated by bending and stress  Stiffness is present all day  Associated symptoms include leg pain  Pertinent negatives include no paresthesias  Leg Pain     Hip Pain       Past Medical History:   Diagnosis Date   • Endometriosis       The following portions of the patient's history were reviewed and updated as appropriate: allergies, past family history, past medical history, past social history, past surgical history, and problem list   Review of Systems   Musculoskeletal: Positive for back pain and neck pain  Neurological: Negative for paresthesias  Physical Exam  Neck:        Comments: Pnful and limited in ext/rrot  Musculoskeletal:      Cervical back: Pain with movement and muscular tenderness present  Decreased range of motion  Thoracic back: Spasms and tenderness present  Decreased range of motion  Lumbar back: Spasms and tenderness present  Decreased range of motion  Negative right straight leg raise test and negative left straight leg raise test         Back:       Comments: Pnful and limited in Ext in R SIJ, Rlf    Lymphadenopathy:      Cervical: No cervical adenopathy  Skin:     General: Skin is warm and dry  Neurological:      Mental Status: She is alert and oriented to person, place, and time  Gait: Gait is intact     Psychiatric:         Mood and Affect: Mood and affect normal          Behavior: Behavior normal        SOFT TISSUE ASSESSMENT: Hypertonicity and tenderness palpated B C5-T6 erector spinae, R upper traps, R lev scap, R SCM, R rhomboid, R QL, R glute med/min, R hip flexor JOINT RECTRICTIONS: C5-T6, L2-S1 and R SIJ   Return in about 2 weeks (around 6/28/2023) for Next scheduled follow up

## 2023-06-28 ENCOUNTER — PROCEDURE VISIT (OUTPATIENT)
Age: 23
End: 2023-06-28
Payer: COMMERCIAL

## 2023-06-28 VITALS
BODY MASS INDEX: 27.48 KG/M2 | WEIGHT: 171 LBS | HEART RATE: 115 BPM | HEIGHT: 66 IN | SYSTOLIC BLOOD PRESSURE: 108 MMHG | DIASTOLIC BLOOD PRESSURE: 81 MMHG

## 2023-06-28 DIAGNOSIS — M99.02 SEGMENTAL DYSFUNCTION OF THORACIC REGION: ICD-10-CM

## 2023-06-28 DIAGNOSIS — M99.04 SEGMENTAL DYSFUNCTION OF SACRAL REGION: Primary | ICD-10-CM

## 2023-06-28 DIAGNOSIS — M99.01 SEGMENTAL DYSFUNCTION OF CERVICAL REGION: ICD-10-CM

## 2023-06-28 DIAGNOSIS — M79.18 DIFFUSE MYOFASCIAL PAIN SYNDROME: ICD-10-CM

## 2023-06-28 DIAGNOSIS — M99.03 SEGMENTAL DYSFUNCTION OF LUMBAR REGION: ICD-10-CM

## 2023-06-28 PROCEDURE — 97110 THERAPEUTIC EXERCISES: CPT | Performed by: CHIROPRACTOR

## 2023-06-28 PROCEDURE — 98941 CHIROPRACT MANJ 3-4 REGIONS: CPT | Performed by: CHIROPRACTOR

## 2023-06-28 NOTE — PROGRESS NOTES
Diagnoses and all orders for this visit:    Segmental dysfunction of sacral region    Diffuse myofascial pain syndrome    Segmental dysfunction of lumbar region    Segmental dysfunction of thoracic region    Segmental dysfunction of cervical region    Pt slightly improved with reduced pain and increased ROM    TREATMENT:  Ther-ex: IASTM - discussed post procedure soreness and/or ecchymosis for up to 36 hrs, applied to affected mm hypertonicities; wall sarah, axial retraction, upper trap stretch, lev scap stretch, SCM stretch, side laying QL stretch, single knee to chest stretch, lat rows with t-band, lat pull down with t-band, abdominal bracing; greater than 15 min spent performing above mentioned ther-ex  Thoracic mobilization/manipulation: prone P-A mob, supine A-P manip; cervical mobilization/manipulation: diversified supine graded mobilization, cervical traction; R SIJ LAT and crespo drop table maneuver; lumbar supine diversified rotary mobilization B    HPI:  Yaa Perez is a 26 y o  female   Chief Complaint   Patient presents with   • Back Pain     Lower right 7/8  Left side pain score 4   • Neck Pain     Pain score 6     Pt presents for tx of chronic intermittent neck/back pain  Pt reports hx of 4 concussions; whiplash associuated; c/s xrays and MRI demonstrate straightening and grade 1 listhesis C3/4/5  Pt underwent PT for neck 3 times without benefit  Pt lifts weights 5 days/wk  6/28: Pt reports feeling and moving better; eased up at gym and bit and watching form more    Back Pain  This is a chronic problem  The current episode started more than 1 year ago  The problem occurs constantly  The pain is present in the lumbar spine, sacro-iliac, thoracic spine and gluteal  The quality of the pain is described as aching  The pain radiates to the right thigh and right knee  Pain scale: 1-5/10  Worse during: worse end of day  The symptoms are aggravated by bending and stress   Stiffness is present all day  Associated symptoms include leg pain  Leg Pain     Hip Pain     Neck Pain   This is a chronic problem  The current episode started more than 1 year ago  The problem occurs constantly  The problem has been waxing and waning  The pain is present in the midline and right side  The quality of the pain is described as aching and shooting  Pain scale: 1-6/10  The symptoms are aggravated by stress and position (crunches, mvmt of neck; palliative includes massage, chiro care, laying on neck support)  Worse during: worse end of day  Stiffness is present all day  Associated symptoms include leg pain  Past Medical History:   Diagnosis Date   • Endometriosis       The following portions of the patient's history were reviewed and updated as appropriate: allergies, past family history, past medical history, past social history, past surgical history, and problem list   Review of Systems   Musculoskeletal: Positive for back pain and neck pain  Physical Exam  Neck:        Comments: Pnful and limited in ext/rrot  Musculoskeletal:      Cervical back: Pain with movement and muscular tenderness present  Decreased range of motion  Thoracic back: Spasms and tenderness present  Decreased range of motion  Lumbar back: Spasms and tenderness present  Decreased range of motion  Negative right straight leg raise test and negative left straight leg raise test         Back:       Comments: Pnful and limited in Ext in R SIJ, Rlf    Lymphadenopathy:      Cervical: No cervical adenopathy  Skin:     General: Skin is warm and dry  Neurological:      Mental Status: She is alert and oriented to person, place, and time  Gait: Gait is intact     Psychiatric:         Mood and Affect: Mood and affect normal          Behavior: Behavior normal        SOFT TISSUE ASSESSMENT: Hypertonicity and tenderness palpated B C5-T6 erector spinae, R upper traps, R lev scap, R SCM, R rhomboid, R QL, R glute med/min, R hip flexor JOINT RECTRICTIONS: C5-T6, L2-S1 and R SIJ   Return in about 2 weeks (around 7/12/2023) for Next scheduled follow up

## 2023-07-12 ENCOUNTER — PROCEDURE VISIT (OUTPATIENT)
Age: 23
End: 2023-07-12
Payer: COMMERCIAL

## 2023-07-12 VITALS
SYSTOLIC BLOOD PRESSURE: 124 MMHG | DIASTOLIC BLOOD PRESSURE: 82 MMHG | BODY MASS INDEX: 27.68 KG/M2 | HEART RATE: 88 BPM | HEIGHT: 66 IN

## 2023-07-12 DIAGNOSIS — M99.01 SEGMENTAL DYSFUNCTION OF CERVICAL REGION: ICD-10-CM

## 2023-07-12 DIAGNOSIS — M79.18 DIFFUSE MYOFASCIAL PAIN SYNDROME: ICD-10-CM

## 2023-07-12 DIAGNOSIS — M99.04 SEGMENTAL DYSFUNCTION OF SACRAL REGION: Primary | ICD-10-CM

## 2023-07-12 DIAGNOSIS — M99.02 SEGMENTAL DYSFUNCTION OF THORACIC REGION: ICD-10-CM

## 2023-07-12 DIAGNOSIS — M99.03 SEGMENTAL DYSFUNCTION OF LUMBAR REGION: ICD-10-CM

## 2023-07-12 PROCEDURE — 98941 CHIROPRACT MANJ 3-4 REGIONS: CPT | Performed by: CHIROPRACTOR

## 2023-07-12 PROCEDURE — 97110 THERAPEUTIC EXERCISES: CPT | Performed by: CHIROPRACTOR

## 2023-07-12 NOTE — PROGRESS NOTES
Diagnoses and all orders for this visit:    Segmental dysfunction of sacral region    Diffuse myofascial pain syndrome    Segmental dysfunction of lumbar region    Segmental dysfunction of thoracic region    Segmental dysfunction of cervical region    Pt slightly improved with reduced pain and increased ROM    TREATMENT:  Ther-ex: IASTM - discussed post procedure soreness and/or ecchymosis for up to 36 hrs, applied to affected mm hypertonicities; wall sarah, axial retraction, upper trap stretch, lev scap stretch, SCM stretch, side laying QL stretch, single knee to chest stretch, lat rows with t-band, lat pull down with t-band, abdominal bracing; greater than 15 min spent performing above mentioned ther-ex. Thoracic mobilization/manipulation: prone P-A mob, supine A-P manip; cervical mobilization/manipulation: diversified supine graded mobilization, cervical traction; R SIJ LAT and crespo drop table maneuver; lumbar supine diversified rotary mobilization B    HPI:  Gabbie Villeda is a 26 y.o. female   Chief Complaint   Patient presents with   • Back Pain     Lower rt side tightness   • Neck Pain     Tightness. Pain score 4/5     Pt presents for tx of chronic intermittent neck/back pain. Pt reports hx of 4 concussions; whiplash associuated; c/s xrays and MRI demonstrate straightening and grade 1 listhesis C3/4/5. Pt underwent PT for neck 3 times without benefit. Pt lifts weights 5 days/wk  7/12 Pt reports feeling and moving better; able to do more at gym without injury    Back Pain  This is a chronic problem. The current episode started more than 1 year ago. The problem occurs constantly. The pain is present in the lumbar spine, sacro-iliac, thoracic spine and gluteal. The quality of the pain is described as aching. The pain radiates to the right thigh and right knee. Pain scale: 1-5/10. Worse during: worse end of day. The symptoms are aggravated by bending and stress. Stiffness is present all day. Associated symptoms include leg pain. Neck Pain   This is a chronic problem. The current episode started more than 1 year ago. The problem occurs constantly. The problem has been waxing and waning. The pain is present in the midline and right side. The quality of the pain is described as aching and shooting. Pain scale: 1-6/10. The symptoms are aggravated by stress and position (crunches, mvmt of neck; palliative includes massage, chiro care, laying on neck support). Worse during: worse end of day. Stiffness is present all day. Associated symptoms include leg pain. Leg Pain     Hip Pain       Past Medical History:   Diagnosis Date   • Endometriosis       The following portions of the patient's history were reviewed and updated as appropriate: allergies, past family history, past medical history, past social history, past surgical history, and problem list.  Review of Systems   Musculoskeletal: Positive for back pain and neck pain. Physical Exam  Neck:        Comments: Pnful and limited in ext/rrot  Musculoskeletal:      Cervical back: Pain with movement and muscular tenderness present. Decreased range of motion. Thoracic back: Spasms and tenderness present. Decreased range of motion. Lumbar back: Spasms and tenderness present. Decreased range of motion. Negative right straight leg raise test and negative left straight leg raise test.        Back:       Comments: Pnful and limited in Ext in R SIJ, Rlf    Lymphadenopathy:      Cervical: No cervical adenopathy. Skin:     General: Skin is warm and dry. Neurological:      Mental Status: She is alert and oriented to person, place, and time. Gait: Gait is intact.    Psychiatric:         Mood and Affect: Mood and affect normal.         Behavior: Behavior normal.       SOFT TISSUE ASSESSMENT: Hypertonicity and tenderness palpated B C5-T6 erector spinae, R upper traps, R lev scap, R SCM, R rhomboid, R QL, R glute med/min, R hip flexor JOINT RECTRICTIONS: C5-T6, L2-S1 and R SIJ   Return in about 1 week (around 7/19/2023) for Next scheduled follow up.

## 2023-07-17 ENCOUNTER — PROCEDURE VISIT (OUTPATIENT)
Age: 23
End: 2023-07-17
Payer: COMMERCIAL

## 2023-07-17 VITALS — BODY MASS INDEX: 27.68 KG/M2 | HEIGHT: 66 IN | DIASTOLIC BLOOD PRESSURE: 71 MMHG | SYSTOLIC BLOOD PRESSURE: 105 MMHG

## 2023-07-17 DIAGNOSIS — M99.03 SEGMENTAL DYSFUNCTION OF LUMBAR REGION: ICD-10-CM

## 2023-07-17 DIAGNOSIS — M79.18 DIFFUSE MYOFASCIAL PAIN SYNDROME: ICD-10-CM

## 2023-07-17 DIAGNOSIS — M99.01 SEGMENTAL DYSFUNCTION OF CERVICAL REGION: ICD-10-CM

## 2023-07-17 DIAGNOSIS — S29.019A THORACIC MYOFASCIAL STRAIN, INITIAL ENCOUNTER: Primary | ICD-10-CM

## 2023-07-17 DIAGNOSIS — M99.02 SEGMENTAL DYSFUNCTION OF THORACIC REGION: ICD-10-CM

## 2023-07-17 DIAGNOSIS — M99.04 SEGMENTAL DYSFUNCTION OF SACRAL REGION: ICD-10-CM

## 2023-07-17 PROCEDURE — 97110 THERAPEUTIC EXERCISES: CPT | Performed by: CHIROPRACTOR

## 2023-07-17 PROCEDURE — 98941 CHIROPRACT MANJ 3-4 REGIONS: CPT | Performed by: CHIROPRACTOR

## 2023-07-17 NOTE — PROGRESS NOTES
Diagnoses and all orders for this visit:    Thoracic myofascial strain, initial encounter    Segmental dysfunction of sacral region    Segmental dysfunction of lumbar region    Segmental dysfunction of thoracic region    Segmental dysfunction of cervical region    Diffuse myofascial pain syndrome    Pt suffered exacerbation but responded well to tx with reduced pain and increased ROM    TREATMENT:  Ther-ex: IASTM - discussed post procedure soreness and/or ecchymosis for up to 36 hrs, applied to affected mm hypertonicities; wall sarah, axial retraction, upper trap stretch, lev scap stretch, SCM stretch, side laying QL stretch, single knee to chest stretch, lat rows with t-band, lat pull down with t-band, abdominal bracing; greater than 15 min spent performing above mentioned ther-ex. Thoracic mobilization/manipulation: prone P-A mob, supine A-P manip; cervical mobilization/manipulation: diversified supine graded mobilization, cervical traction; R SIJ LAT and crespo drop table maneuver; lumbar supine diversified rotary mobilization B    HPI:  Gabbie Villeda is a 26 y.o. female   Chief Complaint   Patient presents with   • Back Pain     Pulled her upper and mis back over the weekend carrying a case of water bottles about a 7      Pt presents for tx of chronic intermittent neck/back pain. Pt reports hx of 4 concussions; whiplash associuated; c/s xrays and MRI demonstrate straightening and grade 1 listhesis C3/4/5. Pt underwent PT for neck 3 times without benefit. Pt lifts weights 5 days/wk  7/17: Pt reports feeling and moving better after last tx until Sat when she was dehydrated by sitting on beach all day and then "pulled back" reaching awkwardly into tall truck bed to pull a case of water off    Back Pain  This is a chronic problem. The current episode started more than 1 year ago. The problem occurs constantly.  The pain is present in the lumbar spine, sacro-iliac, thoracic spine and gluteal. The quality of the pain is described as aching. The pain radiates to the right thigh and right knee. Pain scale: 5-8/10. Worse during: worse end of day. The symptoms are aggravated by bending and stress. Stiffness is present all day. Associated symptoms include leg pain. Neck Pain   This is a chronic problem. The current episode started more than 1 year ago. The problem occurs constantly. The problem has been waxing and waning. The pain is present in the midline and right side. The quality of the pain is described as aching and shooting. Pain scale: 1-6/10. The symptoms are aggravated by stress and position (crunches, mvmt of neck; palliative includes massage, chiro care, laying on neck support). Worse during: worse end of day. Stiffness is present all day. Associated symptoms include leg pain. Leg Pain     Hip Pain       Past Medical History:   Diagnosis Date   • Endometriosis       The following portions of the patient's history were reviewed and updated as appropriate: allergies, past family history, past medical history, past social history, past surgical history, and problem list.  Review of Systems   Musculoskeletal: Positive for back pain and neck pain. Physical Exam  Neck:        Comments: Pnful and limited in ext/rrot  Musculoskeletal:      Cervical back: Pain with movement and muscular tenderness present. Decreased range of motion. Thoracic back: Spasms and tenderness present. Decreased range of motion. Lumbar back: Spasms and tenderness present. Decreased range of motion. Negative right straight leg raise test and negative left straight leg raise test.        Back:       Comments: Pnful and limited in Ext in R SIJ, Rlf    Lymphadenopathy:      Cervical: No cervical adenopathy. Skin:     General: Skin is warm and dry. Neurological:      Mental Status: She is alert and oriented to person, place, and time. Gait: Gait is intact.    Psychiatric:         Mood and Affect: Mood and affect normal. Behavior: Behavior normal.       SOFT TISSUE ASSESSMENT: Hypertonicity and tenderness palpated B C5-T6 erector spinae, R upper traps, R lev scap, R SCM, R rhomboid, R QL, R glute med/min, R hip flexor JOINT RECTRICTIONS: C5-T6, L2-S1 and R SIJ   Return in about 1 week (around 7/24/2023) for Next scheduled follow up.

## 2023-07-26 ENCOUNTER — PROCEDURE VISIT (OUTPATIENT)
Age: 23
End: 2023-07-26
Payer: COMMERCIAL

## 2023-07-26 VITALS
RESPIRATION RATE: 17 BRPM | HEART RATE: 86 BPM | WEIGHT: 171 LBS | DIASTOLIC BLOOD PRESSURE: 68 MMHG | BODY MASS INDEX: 27.48 KG/M2 | SYSTOLIC BLOOD PRESSURE: 116 MMHG | HEIGHT: 66 IN

## 2023-07-26 DIAGNOSIS — M99.04 SEGMENTAL DYSFUNCTION OF SACRAL REGION: ICD-10-CM

## 2023-07-26 DIAGNOSIS — M99.03 SEGMENTAL DYSFUNCTION OF LUMBAR REGION: ICD-10-CM

## 2023-07-26 DIAGNOSIS — M99.02 SEGMENTAL DYSFUNCTION OF THORACIC REGION: ICD-10-CM

## 2023-07-26 DIAGNOSIS — M79.18 DIFFUSE MYOFASCIAL PAIN SYNDROME: ICD-10-CM

## 2023-07-26 DIAGNOSIS — M99.01 SEGMENTAL DYSFUNCTION OF CERVICAL REGION: ICD-10-CM

## 2023-07-26 DIAGNOSIS — S29.019A THORACIC MYOFASCIAL STRAIN, INITIAL ENCOUNTER: Primary | ICD-10-CM

## 2023-07-26 PROCEDURE — 97110 THERAPEUTIC EXERCISES: CPT | Performed by: CHIROPRACTOR

## 2023-07-26 PROCEDURE — 98941 CHIROPRACT MANJ 3-4 REGIONS: CPT | Performed by: CHIROPRACTOR

## 2023-07-26 NOTE — PROGRESS NOTES
Diagnoses and all orders for this visit:    Thoracic myofascial strain, initial encounter    Segmental dysfunction of sacral region    Segmental dysfunction of lumbar region    Segmental dysfunction of thoracic region    Segmental dysfunction of cervical region    Diffuse myofascial pain syndrome    Pt improved with reduced pain and increased ROM    TREATMENT:  Ther-ex: IASTM - discussed post procedure soreness and/or ecchymosis for up to 36 hrs, applied to affected mm hypertonicities; wall sarah, axial retraction, upper trap stretch, lev scap stretch, SCM stretch, side laying QL stretch, single knee to chest stretch, lat rows with t-band, lat pull down with t-band, abdominal bracing; greater than 15 min spent performing above mentioned ther-ex. Thoracic mobilization/manipulation: prone P-A mob, supine A-P manip; cervical mobilization/manipulation: diversified supine graded mobilization, cervical traction; R SIJ LAT and crespo drop table maneuver; lumbar supine diversified rotary mobilization B    HPI:  Ivory Velasquez is a 26 y.o. female   Chief Complaint   Patient presents with   • Back Pain     Back still tight working way down to hip  Rates back pain 6   • Neck Pain     Neck still tight   Rates pain at a 7     Pt presents for tx of chronic intermittent neck/back pain. Pt reports hx of 4 concussions; whiplash associuated; c/s xrays and MRI demonstrate straightening and grade 1 listhesis C3/4/5. Pt underwent PT for neck 3 times without benefit. Pt lifts weights 5 days/wk  7/26: Pt reports feeling and moving better since last tx    Back Pain  This is a chronic problem. The current episode started more than 1 year ago. The problem occurs constantly. The pain is present in the lumbar spine, sacro-iliac, thoracic spine and gluteal. The quality of the pain is described as aching. The pain radiates to the right thigh and right knee. Pain scale: 1-6/10. Worse during: worse end of day.  The symptoms are aggravated by bending and stress. Stiffness is present all day. Associated symptoms include leg pain. Neck Pain   This is a chronic problem. The current episode started more than 1 year ago. The problem occurs constantly. The problem has been waxing and waning. The pain is present in the midline and right side. The quality of the pain is described as aching and shooting. Pain scale: 0-7/10. The symptoms are aggravated by stress and position (crunches, mvmt of neck; palliative includes massage, chiro care, laying on neck support). Worse during: worse end of day. Stiffness is present all day. Associated symptoms include leg pain. Leg Pain     Hip Pain       Past Medical History:   Diagnosis Date   • Endometriosis       The following portions of the patient's history were reviewed and updated as appropriate: allergies, past family history, past medical history, past social history, past surgical history, and problem list.  Review of Systems   Musculoskeletal: Positive for back pain and neck pain. Physical Exam  Neck:        Comments: Pnful and limited in ext/rrot  Musculoskeletal:      Cervical back: Pain with movement and muscular tenderness present. Decreased range of motion. Thoracic back: Spasms and tenderness present. Decreased range of motion. Lumbar back: Spasms and tenderness present. Decreased range of motion. Negative right straight leg raise test and negative left straight leg raise test.        Back:       Comments: Pnful and limited in Ext in R SIJ, Rlf    Lymphadenopathy:      Cervical: No cervical adenopathy. Skin:     General: Skin is warm and dry. Neurological:      Mental Status: She is alert and oriented to person, place, and time. Gait: Gait is intact.    Psychiatric:         Mood and Affect: Mood and affect normal.         Behavior: Behavior normal.       SOFT TISSUE ASSESSMENT: Hypertonicity and tenderness palpated B C5-T6 erector spinae, R upper traps, R lev scap, R SCM, R rhomboid, R QL, R glute med/min, R hip flexor JOINT RECTRICTIONS: C5-T6, L2-S1 and R SIJ   Return in about 1 week (around 8/2/2023) for Next scheduled follow up.

## 2023-07-27 DIAGNOSIS — Z30.011 ENCOUNTER FOR INITIAL PRESCRIPTION OF CONTRACEPTIVE PILLS: ICD-10-CM

## 2023-07-27 RX ORDER — NORGESTREL AND ETHINYL ESTRADIOL 0.3-0.03MG
1 KIT ORAL DAILY
Qty: 84 TABLET | Refills: 0 | Status: SHIPPED | OUTPATIENT
Start: 2023-07-27 | End: 2023-07-28 | Stop reason: SDUPTHER

## 2023-07-28 DIAGNOSIS — Z30.011 ENCOUNTER FOR INITIAL PRESCRIPTION OF CONTRACEPTIVE PILLS: ICD-10-CM

## 2023-07-28 RX ORDER — NORGESTREL AND ETHINYL ESTRADIOL 0.3-0.03MG
1 KIT ORAL DAILY
Qty: 84 TABLET | Refills: 1 | Status: SHIPPED | OUTPATIENT
Start: 2023-07-28

## 2023-07-28 NOTE — TELEPHONE ENCOUNTER
Pt is calling for a refill of her birth control. Pt saw Suha Merlin for her annual exam on 1/9/2023. Pt would like it sent to [phone]. Thank you!

## 2023-08-09 ENCOUNTER — PROCEDURE VISIT (OUTPATIENT)
Age: 23
End: 2023-08-09
Payer: COMMERCIAL

## 2023-08-09 VITALS
DIASTOLIC BLOOD PRESSURE: 82 MMHG | HEIGHT: 66 IN | WEIGHT: 171 LBS | BODY MASS INDEX: 27.48 KG/M2 | HEART RATE: 67 BPM | SYSTOLIC BLOOD PRESSURE: 116 MMHG

## 2023-08-09 DIAGNOSIS — M99.02 SEGMENTAL DYSFUNCTION OF THORACIC REGION: ICD-10-CM

## 2023-08-09 DIAGNOSIS — M99.01 SEGMENTAL DYSFUNCTION OF CERVICAL REGION: ICD-10-CM

## 2023-08-09 DIAGNOSIS — M79.18 DIFFUSE MYOFASCIAL PAIN SYNDROME: ICD-10-CM

## 2023-08-09 DIAGNOSIS — S29.019A THORACIC MYOFASCIAL STRAIN, INITIAL ENCOUNTER: ICD-10-CM

## 2023-08-09 DIAGNOSIS — M99.03 SEGMENTAL DYSFUNCTION OF LUMBAR REGION: ICD-10-CM

## 2023-08-09 DIAGNOSIS — M99.04 SEGMENTAL DYSFUNCTION OF SACRAL REGION: Primary | ICD-10-CM

## 2023-08-09 PROCEDURE — 97110 THERAPEUTIC EXERCISES: CPT | Performed by: CHIROPRACTOR

## 2023-08-09 PROCEDURE — 98941 CHIROPRACT MANJ 3-4 REGIONS: CPT | Performed by: CHIROPRACTOR

## 2023-08-09 NOTE — PROGRESS NOTES
Diagnoses and all orders for this visit:    Segmental dysfunction of sacral region    Segmental dysfunction of lumbar region    Thoracic myofascial strain, initial encounter    Segmental dysfunction of thoracic region    Segmental dysfunction of cervical region    Diffuse myofascial pain syndrome    Pt improved with reduced pain and increased ROM    TREATMENT: [id number]  Ther-ex: IASTM - discussed post procedure soreness and/or ecchymosis for up to 36 hrs, applied to affected mm hypertonicities; wall sarah, axial retraction, upper trap stretch, lev scap stretch, SCM stretch, side laying QL stretch, single knee to chest stretch, lat rows with t-band, lat pull down with t-band, abdominal bracing; greater than 15 min spent performing above mentioned ther-ex. Thoracic mobilization/manipulation: prone P-A mob, supine A-P manip; cervical mobilization/manipulation: diversified supine graded mobilization, cervical traction; R SIJ LAT and crespo drop table maneuver; lumbar supine diversified rotary mobilization B    HPI:  Fred Parker is a 26 y.o. female   No chief complaint on file. Pt presents for tx of chronic intermittent neck/back pain. Pt reports hx of 4 concussions; whiplash associuated; c/s xrays and MRI demonstrate straightening and grade 1 listhesis C3/4/5. Pt underwent PT for neck 3 times without benefit. Pt lifts weights 5 days/wk  8/9: Pt reports feeling and moving better since last tx; has been yoga for 30 min daily, which is helping    Back Pain  This is a chronic problem. The current episode started more than 1 year ago. The problem occurs constantly. The pain is present in the lumbar spine, sacro-iliac, thoracic spine and gluteal. The quality of the pain is described as aching. The pain radiates to the right thigh and right knee. Pain scale: 0-6/10. Worse during: worse end of day. The symptoms are aggravated by bending and stress. Stiffness is present all day.  Associated symptoms include leg pain. Neck Pain   This is a chronic problem. The current episode started more than 1 year ago. The problem occurs constantly. The problem has been waxing and waning. The pain is present in the midline and right side. The quality of the pain is described as aching and shooting. Pain scale: 0-6/10. The symptoms are aggravated by stress and position (crunches, mvmt of neck; palliative includes massage, chiro care, laying on neck support). Worse during: worse end of day. Stiffness is present all day. Associated symptoms include leg pain. Leg Pain     Hip Pain       Past Medical History:   Diagnosis Date   • Endometriosis       The following portions of the patient's history were reviewed and updated as appropriate: allergies, past family history, past medical history, past social history, past surgical history, and problem list.  Review of Systems   Musculoskeletal: Positive for back pain and neck pain. Physical Exam  Neck:        Comments: Pnful and limited in ext/rrot  Musculoskeletal:      Cervical back: Pain with movement and muscular tenderness present. Decreased range of motion. Thoracic back: Spasms and tenderness present. Decreased range of motion. Lumbar back: Spasms and tenderness present. Decreased range of motion. Negative right straight leg raise test and negative left straight leg raise test.        Back:       Comments: Pnful and limited in Ext in R SIJ, Rlf    Lymphadenopathy:      Cervical: No cervical adenopathy. Skin:     General: Skin is warm and dry. Neurological:      Mental Status: She is alert and oriented to person, place, and time. Gait: Gait is intact.    Psychiatric:         Mood and Affect: Mood and affect normal.         Behavior: Behavior normal.       SOFT TISSUE ASSESSMENT: Hypertonicity and tenderness palpated B C5-T6 erector spinae, R upper traps, R lev scap, R SCM, R rhomboid, R QL, R glute med/min, R hip flexor JOINT RECTRICTIONS: C5-T6, L2-S1 and R SIJ Return in about 1 week (around 8/16/2023) for Next scheduled follow up.

## 2023-08-10 ENCOUNTER — OFFICE VISIT (OUTPATIENT)
Dept: OBGYN CLINIC | Facility: CLINIC | Age: 23
End: 2023-08-10
Payer: COMMERCIAL

## 2023-08-10 VITALS
HEIGHT: 66 IN | DIASTOLIC BLOOD PRESSURE: 66 MMHG | SYSTOLIC BLOOD PRESSURE: 110 MMHG | BODY MASS INDEX: 27.8 KG/M2 | WEIGHT: 173 LBS

## 2023-08-10 DIAGNOSIS — N89.8 VAGINAL ITCHING: Primary | ICD-10-CM

## 2023-08-10 DIAGNOSIS — N76.0 ACUTE VAGINITIS: ICD-10-CM

## 2023-08-10 PROCEDURE — 87510 GARDNER VAG DNA DIR PROBE: CPT | Performed by: PHYSICIAN ASSISTANT

## 2023-08-10 PROCEDURE — 87480 CANDIDA DNA DIR PROBE: CPT | Performed by: PHYSICIAN ASSISTANT

## 2023-08-10 PROCEDURE — 99213 OFFICE O/P EST LOW 20 MIN: CPT | Performed by: PHYSICIAN ASSISTANT

## 2023-08-10 PROCEDURE — 87660 TRICHOMONAS VAGIN DIR PROBE: CPT | Performed by: PHYSICIAN ASSISTANT

## 2023-08-10 NOTE — PROGRESS NOTES
Assessment/Plan:    No problem-specific Assessment & Plan notes found for this encounter. Diagnoses and all orders for this visit:    Vaginal itching    Acute vaginitis  -     VAGINOSIS DNA PROBE (AFFIRM)          Subjective:      Patient ID: Tavon Freitas is a 26 y.o. female. Pt presents for a problem visit today  She complains of vaginal itching and burning x 1 week  She denies any change in meds, products. .. Did have unprotected IC with new partner ~ 2 weeks jaylan  LMP 2 weeks ago  Bowel and bladder are regular    Cultures done  rec daily probiotic  External witch hazel prn      The following portions of the patient's history were reviewed and updated as appropriate: allergies, current medications, past family history, past medical history, past social history, past surgical history and problem list.    Review of Systems   Constitutional: Negative for chills, fever and unexpected weight change. Gastrointestinal: Negative for abdominal pain, blood in stool, constipation and diarrhea. Genitourinary: Positive for vaginal pain. Negative for vaginal bleeding and vaginal discharge. Objective:      /66 (BP Location: Right arm, Patient Position: Sitting, Cuff Size: Standard)   Ht 5' 5.9" (1.674 m)   Wt 78.5 kg (173 lb)   LMP 07/26/2023 (Approximate)   BMI 28.01 kg/m²          Physical Exam  Vitals and nursing note reviewed. Constitutional:       Appearance: She is well-developed. Genitourinary:     Exam position: Supine. Labia:         Right: No rash or lesion. Left: No rash or lesion. Vagina: Erythema present. No vaginal discharge. Cervix: No cervical motion tenderness, discharge or friability. Lymphadenopathy:      Lower Body: No right inguinal adenopathy. No left inguinal adenopathy.

## 2023-08-11 LAB
CANDIDA RRNA VAG QL PROBE: POSITIVE
G VAGINALIS RRNA GENITAL QL PROBE: NEGATIVE
T VAGINALIS RRNA GENITAL QL PROBE: NEGATIVE

## 2023-08-14 DIAGNOSIS — B37.9 YEAST INFECTION: Primary | ICD-10-CM

## 2023-09-08 ENCOUNTER — PROCEDURE VISIT (OUTPATIENT)
Age: 23
End: 2023-09-08
Payer: COMMERCIAL

## 2023-09-08 VITALS
HEART RATE: 78 BPM | BODY MASS INDEX: 27.8 KG/M2 | WEIGHT: 173 LBS | SYSTOLIC BLOOD PRESSURE: 120 MMHG | HEIGHT: 66 IN | DIASTOLIC BLOOD PRESSURE: 78 MMHG

## 2023-09-08 DIAGNOSIS — M79.18 DIFFUSE MYOFASCIAL PAIN SYNDROME: ICD-10-CM

## 2023-09-08 DIAGNOSIS — M99.03 SEGMENTAL DYSFUNCTION OF LUMBAR REGION: ICD-10-CM

## 2023-09-08 DIAGNOSIS — M99.04 SEGMENTAL DYSFUNCTION OF SACRAL REGION: Primary | ICD-10-CM

## 2023-09-08 DIAGNOSIS — M99.01 SEGMENTAL DYSFUNCTION OF CERVICAL REGION: ICD-10-CM

## 2023-09-08 DIAGNOSIS — M99.02 SEGMENTAL DYSFUNCTION OF THORACIC REGION: ICD-10-CM

## 2023-09-08 DIAGNOSIS — S29.019A THORACIC MYOFASCIAL STRAIN, INITIAL ENCOUNTER: ICD-10-CM

## 2023-09-08 PROCEDURE — 98941 CHIROPRACT MANJ 3-4 REGIONS: CPT | Performed by: CHIROPRACTOR

## 2023-09-08 PROCEDURE — 97110 THERAPEUTIC EXERCISES: CPT | Performed by: CHIROPRACTOR

## 2023-09-08 NOTE — PROGRESS NOTES
Diagnoses and all orders for this visit:    Segmental dysfunction of sacral region    Segmental dysfunction of lumbar region    Thoracic myofascial strain, initial encounter    Segmental dysfunction of thoracic region    Segmental dysfunction of cervical region    Diffuse myofascial pain syndrome    Pt suffered exacerbation but responded to tx with reduced pain and increased ROM    TREATMENT: [id number]  Ther-ex: IASTM - discussed post procedure soreness and/or ecchymosis for up to 36 hrs, applied to affected mm hypertonicities; wall sarah, axial retraction, upper trap stretch, lev scap stretch, SCM stretch, side laying QL stretch, single knee to chest stretch, lat rows with t-band, lat pull down with t-band, abdominal bracing; greater than 15 min spent performing above mentioned ther-ex. Thoracic mobilization/manipulation: prone P-A mob, supine A-P manip; cervical mobilization/manipulation: diversified supine graded mobilization, cervical traction; R SIJ LAT and crespo drop table maneuver; lumbar supine diversified rotary mobilization B    HPI:  Michelle Zee is a 26 y.o. female   Chief Complaint   Patient presents with   • Neck - Pain     Neck pain that is tight. Pain score 6   • Back Pain     Lower lumbar pain is tight. Pain score 6     Pt presents for tx of chronic intermittent neck/back pain. Pt reports hx of 4 concussions; whiplash associuated; c/s xrays and MRI demonstrate straightening and grade 1 listhesis C3/4/5. Pt underwent PT for neck 3 times without benefit. Pt lifts weights 5 days/wk  8/9: Pt reports feeling and moving better since last tx but suffered flare-up with trip to [place] and then squat machine at gym    Back Pain  This is a chronic problem. The current episode started more than 1 year ago. The problem occurs constantly. The pain is present in the lumbar spine, sacro-iliac, thoracic spine and gluteal. The quality of the pain is described as aching.  The pain radiates to the right thigh and right knee. Pain scale: 0-6/10. Worse during: worse end of day. The symptoms are aggravated by bending and stress. Stiffness is present all day. Associated symptoms include leg pain. Neck Pain   This is a chronic problem. The current episode started more than 1 year ago. The problem occurs constantly. The problem has been waxing and waning. The pain is present in the midline and right side. The quality of the pain is described as aching and shooting. Pain scale: 0-6/10. The symptoms are aggravated by stress and position (crunches, mvmt of neck; palliative includes massage, chiro care, laying on neck support). Worse during: worse end of day. Stiffness is present all day. Associated symptoms include leg pain. Leg Pain     Hip Pain       Past Medical History:   Diagnosis Date   • Endometriosis       The following portions of the patient's history were reviewed and updated as appropriate: allergies, past family history, past medical history, past social history, past surgical history, and problem list.  Review of Systems   Musculoskeletal: Positive for back pain and neck pain. Physical Exam  Neck:        Comments: Pnful and limited in ext/rrot  Musculoskeletal:      Cervical back: Pain with movement and muscular tenderness present. Decreased range of motion. Thoracic back: Spasms and tenderness present. Decreased range of motion. Lumbar back: Spasms and tenderness present. Decreased range of motion. Negative right straight leg raise test and negative left straight leg raise test.        Back:       Comments: Pnful and limited in Ext in R SIJ, Rlf    Lymphadenopathy:      Cervical: No cervical adenopathy. Skin:     General: Skin is warm and dry. Neurological:      Mental Status: She is alert and oriented to person, place, and time. Gait: Gait is intact.    Psychiatric:         Mood and Affect: Mood and affect normal.         Behavior: Behavior normal.       SOFT TISSUE ASSESSMENT: Hypertonicity and tenderness palpated B C5-T6 erector spinae, R upper traps, R lev scap, R SCM, R rhomboid, R QL, R glute med/min, R hip flexor JOINT RECTRICTIONS: C5-T6, L2-S1 and R SIJ   Return in about 1 week (around 9/15/2023) for Next scheduled follow up.

## 2023-10-04 ENCOUNTER — PROCEDURE VISIT (OUTPATIENT)
Age: 23
End: 2023-10-04
Payer: COMMERCIAL

## 2023-10-04 VITALS
SYSTOLIC BLOOD PRESSURE: 111 MMHG | DIASTOLIC BLOOD PRESSURE: 68 MMHG | BODY MASS INDEX: 27.8 KG/M2 | WEIGHT: 173 LBS | HEIGHT: 66 IN

## 2023-10-04 DIAGNOSIS — M99.03 SEGMENTAL DYSFUNCTION OF LUMBAR REGION: ICD-10-CM

## 2023-10-04 DIAGNOSIS — M99.04 SEGMENTAL DYSFUNCTION OF SACRAL REGION: ICD-10-CM

## 2023-10-04 DIAGNOSIS — M99.01 SEGMENTAL DYSFUNCTION OF CERVICAL REGION: ICD-10-CM

## 2023-10-04 DIAGNOSIS — M79.18 DIFFUSE MYOFASCIAL PAIN SYNDROME: ICD-10-CM

## 2023-10-04 DIAGNOSIS — S29.019A THORACIC MYOFASCIAL STRAIN, INITIAL ENCOUNTER: Primary | ICD-10-CM

## 2023-10-04 PROCEDURE — 98941 CHIROPRACT MANJ 3-4 REGIONS: CPT | Performed by: CHIROPRACTOR

## 2023-10-04 PROCEDURE — 97110 THERAPEUTIC EXERCISES: CPT | Performed by: CHIROPRACTOR

## 2023-10-04 NOTE — PROGRESS NOTES
Diagnoses and all orders for this visit:    Thoracic myofascial strain, initial encounter    Segmental dysfunction of lumbar region    Segmental dysfunction of sacral region    Segmental dysfunction of cervical region    Diffuse myofascial pain syndrome    Pt suffered exacerbation; responded well to tx with reduced pain and increased ROM, however due to presence of pain since August and mechanism of injury of lifting heavy cooler up into truck, sending for x-rays to r/o compression fx. Pt also complaining of increased R periscap pain since feeling more bloating in abdomen post meals therefore is following up with PCP; gallbladder was tender to palpation on exam, R periscap pain likely part of thoracic strain but may be referred from galbladder as well. Pt to start PT as well to assess workout routine and form    TREATMENT: [id number]  Ther-ex: IASTM - discussed post procedure soreness and/or ecchymosis for up to 36 hrs, applied to affected mm hypertonicities; wall sarah, axial retraction, upper trap stretch, lev scap stretch, SCM stretch, side laying QL stretch, single knee to chest stretch, lat rows with t-band, lat pull down with t-band, abdominal bracing; greater than 15 min spent performing above mentioned ther-ex. Thoracic mobilization/manipulation: prone P-A mob, supine A-P manip; cervical mobilization/manipulation: diversified supine graded mobilization, cervical traction; R SIJ LAT and crespo drop table maneuver; lumbar supine diversified rotary mobilization B    HPI:  Faina Cardoza is a 26 y.o. female   Chief Complaint   Patient presents with   • Neck Pain     A little tight about a 4    • Back Pain     Bilateral thoracic pain about an 8      Pt presents for tx of chronic intermittent neck/back pain. Pt reports hx of 4 concussions; whiplash associuated; c/s xrays and MRI demonstrate straightening and grade 1 listhesis C3/4/5. Pt underwent PT for neck 3 times without benefit.  Pt lifts weights 5 days/wk  10/4: Pt reports still feeling pain since lifting heavy cooler into truck in August; had responded well to tx initially but persisted since: thinks "overtraining" also responsible therefore has eased up at gym since last Sept. Pt also reports abdominal ache and bloating recently with increased R periscap pain    Back Pain  This is a chronic problem. The current episode started more than 1 year ago. The problem occurs constantly. The pain is present in the lumbar spine, sacro-iliac, thoracic spine and gluteal. The quality of the pain is described as aching. The pain radiates to the right thigh and right knee. Pain scale: 0-8/10. Worse during: worse end of day. The symptoms are aggravated by bending and stress. Stiffness is present all day. Associated symptoms include leg pain. Neck Pain   This is a chronic problem. The current episode started more than 1 year ago. The problem occurs constantly. The problem has been waxing and waning. The pain is present in the midline and right side. The quality of the pain is described as aching and shooting. Pain scale: 0-4/10. The symptoms are aggravated by stress and position (crunches, mvmt of neck; palliative includes massage, chiro care, laying on neck support). Worse during: worse end of day. Stiffness is present all day. Associated symptoms include leg pain. Leg Pain     Hip Pain       Past Medical History:   Diagnosis Date   • Endometriosis       The following portions of the patient's history were reviewed and updated as appropriate: allergies, past family history, past medical history, past social history, past surgical history, and problem list.  Review of Systems   Musculoskeletal: Positive for back pain and neck pain. Physical Exam  Neck:        Comments: Pnful and limited in ext/rrot  Musculoskeletal:      Cervical back: Pain with movement and muscular tenderness present. Decreased range of motion. Thoracic back: Spasms and tenderness present. Decreased range of motion. Lumbar back: Spasms and tenderness present. Decreased range of motion. Negative right straight leg raise test and negative left straight leg raise test.        Back:       Comments: Pnful and limited in Ext in R SIJ, Rlf    Lymphadenopathy:      Cervical: No cervical adenopathy. Skin:     General: Skin is warm and dry. Neurological:      Mental Status: She is alert and oriented to person, place, and time. Gait: Gait is intact. Psychiatric:         Mood and Affect: Mood and affect normal.         Behavior: Behavior normal.       SOFT TISSUE ASSESSMENT: Hypertonicity and tenderness palpated B C5-T6 erector spinae, R upper traps, R lev scap, R SCM, R rhomboid, R QL, R glute med/min, R hip flexor JOINT RECTRICTIONS: C5-T6, L2-S1 and R SIJ   Return in about 2 weeks (around 10/18/2023) for Next scheduled follow up.

## 2023-10-06 ENCOUNTER — OFFICE VISIT (OUTPATIENT)
Dept: FAMILY MEDICINE CLINIC | Facility: CLINIC | Age: 23
End: 2023-10-06
Payer: COMMERCIAL

## 2023-10-06 VITALS
DIASTOLIC BLOOD PRESSURE: 68 MMHG | RESPIRATION RATE: 16 BRPM | SYSTOLIC BLOOD PRESSURE: 108 MMHG | HEIGHT: 66 IN | WEIGHT: 170 LBS | OXYGEN SATURATION: 97 % | BODY MASS INDEX: 27.32 KG/M2 | HEART RATE: 93 BPM | TEMPERATURE: 97.6 F

## 2023-10-06 DIAGNOSIS — K90.49 FOOD INTOLERANCE: ICD-10-CM

## 2023-10-06 DIAGNOSIS — M54.6 CHRONIC RIGHT-SIDED THORACIC BACK PAIN: Primary | ICD-10-CM

## 2023-10-06 DIAGNOSIS — G89.29 CHRONIC RIGHT-SIDED THORACIC BACK PAIN: Primary | ICD-10-CM

## 2023-10-06 DIAGNOSIS — R63.8: ICD-10-CM

## 2023-10-06 DIAGNOSIS — Z13.1 SCREENING FOR DIABETES MELLITUS: ICD-10-CM

## 2023-10-06 DIAGNOSIS — Z13.29 SCREENING FOR THYROID DISORDER: ICD-10-CM

## 2023-10-06 PROCEDURE — 99214 OFFICE O/P EST MOD 30 MIN: CPT | Performed by: NURSE PRACTITIONER

## 2023-10-06 RX ORDER — METHOCARBAMOL 500 MG/1
500 TABLET, FILM COATED ORAL 3 TIMES DAILY PRN
Qty: 20 TABLET | Refills: 0 | Status: SHIPPED | OUTPATIENT
Start: 2023-10-06

## 2023-10-06 NOTE — ASSESSMENT & PLAN NOTE
Requesting food allergy testing, having a lot of bloating and abd discomfort, feels she is always hungry. Will check labs.

## 2023-10-06 NOTE — ASSESSMENT & PLAN NOTE
Referral to PT, try methocarbamol at bedtime, otc nsaids.   Pt instructed to call for reevaluation if sx worsen or persist.

## 2023-10-06 NOTE — PROGRESS NOTES
Name: Lanette Gomez      : 2000      MRN: [id number]  Encounter Provider: FELICIA Godfrey  Encounter Date: 10/6/2023   Encounter department: [facility]     1. Chronic right-sided thoracic back pain  Assessment & Plan:  Referral to PT, try methocarbamol at bedtime, otc nsaids. Pt instructed to call for reevaluation if sx worsen or persist.      Orders:  -     Ambulatory Referral to Physical Therapy; Future  -     methocarbamol (ROBAXIN) 500 mg tablet; Take 1 tablet (500 mg total) by mouth 3 (three) times a day as needed for muscle spasms    2. Food intolerance  Assessment & Plan:  Requesting food allergy testing, having a lot of bloating and abd discomfort, feels she is always hungry. Will check labs. Orders:  -     Food Allergy Profile; Future  -     CBC and differential; Future  -     Comprehensive metabolic panel; Future    3. Screening for thyroid disorder  -     TSH, 3rd generation with Free T4 reflex; Future    4. Screening for diabetes mellitus  -     Comprehensive metabolic panel; Future  -     Hemoglobin A1C; Future    5. Sensation of hunger  -     CBC and differential; Future  -     Comprehensive metabolic panel; Future  -     TSH, 3rd generation with Free T4 reflex; Future  -     Hemoglobin A1C; Future         Subjective      Pt is a 25 yo female here for ealuation of hip and back pain. Past medical history of chronic pain due to trauma, postconcussion syndrome, late effect of intracranial trauma. She says she has had R mid back pain for over a year, she notes that about 7 months ago she says it popped and effected her R shoulder blade and her R hip. She does strength training and tries to avoid any exercises that would use the lats. She says no matter what she does she still feels like that area end up hurting. She says pain has become constant for the last 2 months. She rates pain 7/10. No numbness or tingling.   She does yoga and she notes this does give her short term relief. She has a massage once a month and that also is only short term relief. She also sees the chiropractor and this also provides her short term relief. She was here for evaluation last year for the same issue and she was given a muscle relaxer but she says it made her too sleepy. She has not tried ibuprofen or acetaminophen. She says she does not like to take medication. She uses cbd lotion as needed. Review of Systems   Constitutional: Positive for appetite change (increased hunger). Negative for activity change, chills, fatigue and fever. Respiratory: Negative for shortness of breath. Cardiovascular: Negative for chest pain. Gastrointestinal: Positive for abdominal distention. Musculoskeletal: Positive for back pain and myalgias. Negative for arthralgias and joint swelling. Skin: Negative for color change, rash and wound. Neurological: Negative for dizziness, weakness and headaches. Hematological: Negative for adenopathy. Current Outpatient Medications on File Prior to Visit   Medication Sig   • albuterol (Ventolin HFA) 90 mcg/act inhaler Inhale 2 puffs every 6 (six) hours as needed for wheezing   • norgestrel-ethinyl estradiol (Low-Ogestrel) 0.3 mg-30 mcg per tablet Take 1 tablet by mouth daily       Objective     /68 (BP Location: Left arm, Patient Position: Sitting, Cuff Size: Adult)   Pulse 93   Temp 97.6 °F (36.4 °C) (Tympanic)   Resp 16   Ht 5' 5.9" (1.674 m)   Wt 77.1 kg (170 lb)   SpO2 97%   BMI 27.52 kg/m²     Physical Exam  Vitals reviewed. Constitutional:       General: She is awake. She is not in acute distress. Appearance: Normal appearance. She is well-developed and well-groomed. She is not ill-appearing. Cardiovascular:      Rate and Rhythm: Normal rate and regular rhythm. Pulses: Normal pulses. Heart sounds: Normal heart sounds. No murmur heard.   Pulmonary:      Effort: Pulmonary effort is normal.      Breath sounds: Normal breath sounds. Musculoskeletal:         General: Normal range of motion. Cervical back: Full passive range of motion without pain and normal range of motion. No rigidity, tenderness or bony tenderness. No pain with movement or muscular tenderness. Normal range of motion. Thoracic back: Tenderness present. No swelling or deformity. Normal range of motion. Skin:     General: Skin is warm and dry. Findings: No erythema. Neurological:      Mental Status: She is alert and oriented to person, place, and time. Motor: Motor function is intact. Psychiatric:         Attention and Perception: Attention normal.         Mood and Affect: Mood normal.         Speech: Speech normal.         Behavior: Behavior normal. Behavior is cooperative. Thought Content:  Thought content normal.         Cognition and Memory: Cognition normal.         Judgment: Judgment normal.       FELICIA Godfrey

## 2023-10-10 ENCOUNTER — APPOINTMENT (OUTPATIENT)
Dept: LAB | Facility: AMBULARY SURGERY CENTER | Age: 23
End: 2023-10-10
Payer: COMMERCIAL

## 2023-10-10 DIAGNOSIS — R63.8: ICD-10-CM

## 2023-10-10 DIAGNOSIS — Z13.1 SCREENING FOR DIABETES MELLITUS: ICD-10-CM

## 2023-10-10 DIAGNOSIS — Z13.29 SCREENING FOR THYROID DISORDER: ICD-10-CM

## 2023-10-10 DIAGNOSIS — K90.49 FOOD INTOLERANCE: ICD-10-CM

## 2023-10-10 LAB
ALBUMIN SERPL BCP-MCNC: 4.3 G/DL (ref 3.5–5)
ALP SERPL-CCNC: 56 U/L (ref 34–104)
ALT SERPL W P-5'-P-CCNC: 11 U/L (ref 7–52)
ANION GAP SERPL CALCULATED.3IONS-SCNC: 9 MMOL/L
AST SERPL W P-5'-P-CCNC: 21 U/L (ref 13–39)
BASOPHILS # BLD AUTO: 0.05 THOUSANDS/ÂΜL (ref 0–0.1)
BASOPHILS NFR BLD AUTO: 1 % (ref 0–1)
BILIRUB SERPL-MCNC: 0.59 MG/DL (ref 0.2–1)
BUN SERPL-MCNC: 13 MG/DL (ref 5–25)
CALCIUM SERPL-MCNC: 9.7 MG/DL (ref 8.4–10.2)
CHLORIDE SERPL-SCNC: 103 MMOL/L (ref 96–108)
CO2 SERPL-SCNC: 26 MMOL/L (ref 21–32)
CREAT SERPL-MCNC: 0.75 MG/DL (ref 0.6–1.3)
EOSINOPHIL # BLD AUTO: 0.2 THOUSAND/ÂΜL (ref 0–0.61)
EOSINOPHIL NFR BLD AUTO: 3 % (ref 0–6)
ERYTHROCYTE [DISTWIDTH] IN BLOOD BY AUTOMATED COUNT: 11.8 % (ref 11.6–15.1)
EST. AVERAGE GLUCOSE BLD GHB EST-MCNC: 97 MG/DL
GFR SERPL CREATININE-BSD FRML MDRD: 113 ML/MIN/1.73SQ M
GLUCOSE P FAST SERPL-MCNC: 77 MG/DL (ref 65–99)
HBA1C MFR BLD: 5 %
HCT VFR BLD AUTO: 44.4 % (ref 34.8–46.1)
HGB BLD-MCNC: 14.9 G/DL (ref 11.5–15.4)
IMM GRANULOCYTES # BLD AUTO: 0.01 THOUSAND/UL (ref 0–0.2)
IMM GRANULOCYTES NFR BLD AUTO: 0 % (ref 0–2)
LYMPHOCYTES # BLD AUTO: 2.14 THOUSANDS/ÂΜL (ref 0.6–4.47)
LYMPHOCYTES NFR BLD AUTO: 33 % (ref 14–44)
MCH RBC QN AUTO: 32.1 PG (ref 26.8–34.3)
MCHC RBC AUTO-ENTMCNC: 33.6 G/DL (ref 31.4–37.4)
MCV RBC AUTO: 96 FL (ref 82–98)
MONOCYTES # BLD AUTO: 0.43 THOUSAND/ÂΜL (ref 0.17–1.22)
MONOCYTES NFR BLD AUTO: 7 % (ref 4–12)
NEUTROPHILS # BLD AUTO: 3.66 THOUSANDS/ÂΜL (ref 1.85–7.62)
NEUTS SEG NFR BLD AUTO: 56 % (ref 43–75)
NRBC BLD AUTO-RTO: 0 /100 WBCS
PLATELET # BLD AUTO: 336 THOUSANDS/UL (ref 149–390)
PMV BLD AUTO: 10 FL (ref 8.9–12.7)
POTASSIUM SERPL-SCNC: 4.3 MMOL/L (ref 3.5–5.3)
PROT SERPL-MCNC: 7.5 G/DL (ref 6.4–8.4)
RBC # BLD AUTO: 4.64 MILLION/UL (ref 3.81–5.12)
SODIUM SERPL-SCNC: 138 MMOL/L (ref 135–147)
TSH SERPL DL<=0.05 MIU/L-ACNC: 1.76 UIU/ML (ref 0.45–4.5)
WBC # BLD AUTO: 6.49 THOUSAND/UL (ref 4.31–10.16)

## 2023-10-10 PROCEDURE — 86003 ALLG SPEC IGE CRUDE XTRC EA: CPT

## 2023-10-10 PROCEDURE — 83036 HEMOGLOBIN GLYCOSYLATED A1C: CPT

## 2023-10-10 PROCEDURE — 36415 COLL VENOUS BLD VENIPUNCTURE: CPT

## 2023-10-10 PROCEDURE — 84443 ASSAY THYROID STIM HORMONE: CPT

## 2023-10-10 PROCEDURE — 85025 COMPLETE CBC W/AUTO DIFF WBC: CPT

## 2023-10-10 PROCEDURE — 82785 ASSAY OF IGE: CPT

## 2023-10-10 PROCEDURE — 80053 COMPREHEN METABOLIC PANEL: CPT

## 2023-10-11 LAB
ALMOND IGE QN: 0.15 KUA/I
CASHEW NUT IGE QN: <0.1 KUA/I
CODFISH IGE QN: <0.1 KUA/I
EGG WHITE IGE QN: <0.1 KUA/I
GLUTEN IGE QN: <0.1 KUA/I
HAZELNUT IGE QN: 2.04 KUA/L
MILK IGE QN: <0.1 KUA/I
PEANUT IGE QN: <0.1 KUA/I
SALMON IGE QN: <0.1 KUA/I
SCALLOP IGE QN: <0.1 KUA/L
SESAME SEED IGE QN: <0.1 KUA/I
SHRIMP IGE QN: <0.1 KUA/L
SOYBEAN IGE QN: <0.1 KUA/I
TOTAL IGE SMQN RAST: 58.3 KU/L (ref 0–113)
TUNA IGE QN: <0.1 KUA/I
WALNUT IGE QN: <0.1 KUA/I
WHEAT IGE QN: <0.1 KUA/I

## 2023-11-08 ENCOUNTER — PROCEDURE VISIT (OUTPATIENT)
Age: 23
End: 2023-11-08
Payer: COMMERCIAL

## 2023-11-08 ENCOUNTER — TELEPHONE (OUTPATIENT)
Dept: OBGYN CLINIC | Facility: CLINIC | Age: 23
End: 2023-11-08

## 2023-11-08 VITALS — BODY MASS INDEX: 27.32 KG/M2 | WEIGHT: 170 LBS | HEIGHT: 66 IN

## 2023-11-08 DIAGNOSIS — M99.03 SEGMENTAL DYSFUNCTION OF LUMBAR REGION: ICD-10-CM

## 2023-11-08 DIAGNOSIS — M99.02 SEGMENTAL DYSFUNCTION OF THORACIC REGION: ICD-10-CM

## 2023-11-08 DIAGNOSIS — M99.04 SEGMENTAL DYSFUNCTION OF SACRAL REGION: ICD-10-CM

## 2023-11-08 DIAGNOSIS — M99.01 SEGMENTAL DYSFUNCTION OF CERVICAL REGION: ICD-10-CM

## 2023-11-08 DIAGNOSIS — S29.019A THORACIC MYOFASCIAL STRAIN, INITIAL ENCOUNTER: Primary | ICD-10-CM

## 2023-11-08 DIAGNOSIS — M79.18 DIFFUSE MYOFASCIAL PAIN SYNDROME: ICD-10-CM

## 2023-11-08 PROCEDURE — 98941 CHIROPRACT MANJ 3-4 REGIONS: CPT | Performed by: CHIROPRACTOR

## 2023-11-08 PROCEDURE — 97110 THERAPEUTIC EXERCISES: CPT | Performed by: CHIROPRACTOR

## 2023-11-08 NOTE — TELEPHONE ENCOUNTER
Patient called, c/o Left breast discomfort in the nipple area for 1 week now. It was just sore to the touch, but now, it's sore all of the time. She is not sure if she should have an appointment or go for imaging?

## 2023-11-09 NOTE — TELEPHONE ENCOUNTER
This is a [place] patient. Please schedule her for an appointment within the next 2 weeks. This may be due to her menses. She can see any provider.    Dot works 8-4 daily, and is in our office on Thursdays

## 2023-11-09 NOTE — PROGRESS NOTES
Diagnoses and all orders for this visit:    Thoracic myofascial strain, initial encounter    Segmental dysfunction of lumbar region    Segmental dysfunction of cervical region    Diffuse myofascial pain syndrome    Segmental dysfunction of thoracic region    Segmental dysfunction of sacral region    Pt improved with reduced pain and increased ROM    TREATMENT: [id number]  Ther-ex: IASTM - discussed post procedure soreness and/or ecchymosis for up to 36 hrs, applied to affected mm hypertonicities; wall sarah, axial retraction, upper trap stretch, lev scap stretch, SCM stretch, side laying QL stretch, single knee to chest stretch, lat rows with t-band, lat pull down with t-band, abdominal bracing; greater than 15 min spent performing above mentioned ther-ex. Thoracic mobilization/manipulation: prone P-A mob, supine A-P manip; cervical mobilization/manipulation: diversified supine graded mobilization, cervical traction; R SIJ LAT and crespo drop table maneuver; lumbar supine diversified rotary mobilization B    HPI:  Marcell Ou is a 26 y.o. female   Chief Complaint   Patient presents with    Neck - Pain     Neck is tight. Pain score 4    Back Pain     Lower lumbar is tight. Pain score 4     Pt presents for tx of chronic intermittent neck/back pain. Pt reports hx of 4 concussions; whiplash associuated; c/s xrays and MRI demonstrate straightening and grade 1 listhesis C3/4/5. Pt underwent PT for neck 3 times without benefit. Pt lifts weights 5 days/wk  11/8: Pt reports feeling and moving better since last seen; never ended up going to PT but is able to perform all exercises at gym    Neck Pain   This is a chronic problem. The current episode started more than 1 year ago. The problem occurs constantly. The problem has been waxing and waning. The pain is present in the midline and right side. The quality of the pain is described as aching and shooting. Pain scale: 0-4/10.  The symptoms are aggravated by stress and position (crunches, mvmt of neck; palliative includes massage, chiro care, laying on neck support). Worse during: worse end of day. Stiffness is present All day. Associated symptoms include leg pain. Back Pain  This is a chronic problem. The current episode started more than 1 year ago. The problem occurs constantly. The pain is present in the lumbar spine, sacro-iliac, thoracic spine and gluteal. The quality of the pain is described as aching. The pain radiates to the right thigh and right knee. Pain scale: 0-4/10. Worse during: worse end of day. The symptoms are aggravated by bending and stress. Stiffness is present All day. Associated symptoms include leg pain. Leg Pain     Hip Pain       Past Medical History:   Diagnosis Date    Endometriosis       The following portions of the patient's history were reviewed and updated as appropriate: allergies, past family history, past medical history, past social history, past surgical history, and problem list.  Review of Systems   Musculoskeletal:  Positive for back pain and neck pain. Physical Exam  Neck:        Comments: Pnful and limited in ext/rrot  Musculoskeletal:      Cervical back: Pain with movement and muscular tenderness present. Decreased range of motion. Thoracic back: Spasms and tenderness present. Decreased range of motion. Lumbar back: Spasms and tenderness present. Decreased range of motion. Negative right straight leg raise test and negative left straight leg raise test.        Back:       Comments: Pnful and limited in Ext in R SIJ, Rlf    Lymphadenopathy:      Cervical: No cervical adenopathy. Skin:     General: Skin is warm and dry. Neurological:      Mental Status: She is alert and oriented to person, place, and time. Gait: Gait is intact.    Psychiatric:         Mood and Affect: Mood and affect normal.         Behavior: Behavior normal.       SOFT TISSUE ASSESSMENT: Hypertonicity and tenderness palpated B C5-T6 erector spinae, R upper traps, R lev scap, R SCM, R rhomboid, R QL, R glute med/min, R hip flexor JOINT RECTRICTIONS: C5-T6, L2-S1 and R SIJ   Return in about 1 week (around 11/15/2023) for Next scheduled follow up.

## 2023-11-14 ENCOUNTER — OFFICE VISIT (OUTPATIENT)
Dept: OBGYN CLINIC | Facility: CLINIC | Age: 23
End: 2023-11-14
Payer: COMMERCIAL

## 2023-11-14 VITALS
BODY MASS INDEX: 27.71 KG/M2 | WEIGHT: 172.4 LBS | HEIGHT: 66 IN | DIASTOLIC BLOOD PRESSURE: 74 MMHG | SYSTOLIC BLOOD PRESSURE: 110 MMHG

## 2023-11-14 DIAGNOSIS — N64.4 BREAST PAIN, LEFT: Primary | ICD-10-CM

## 2023-11-14 PROCEDURE — 99213 OFFICE O/P EST LOW 20 MIN: CPT | Performed by: OBSTETRICS & GYNECOLOGY

## 2023-11-14 NOTE — PROGRESS NOTES
Assessment/Plan    Diagnoses and all orders for this visit:    Breast pain, left  -     US breast left limited (diagnostic); Future    Reviewed SBE. Subjective     Starla Reynoso is a 26 y.o. female here for a problem visit. Patient is complaining of left breast pain below/ around nipple x 7-10 days. Denies any palpable mass or nipple discharge. States she has had nipple d/c in the past secondary to hx of nipple piercing. Taking OCP and reportedly due for menses in about a week. Patient Active Problem List   Diagnosis    ASCUS with positive high risk HPV cervical    Chronic pain due to trauma    Myalgia    Overweight (BMI 25.0-29. 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   Contraception: OCP (estrogen/progesterone)  Last Pap: 2023- NILM     Obstetric History  OB History    Para Term  AB Living   0 0 0 0 0 0   SAB IAB Ectopic Multiple Live Births   0 0 0 0 0   Obstetric Comments   Menarche: 16         Past Medical History:   Diagnosis Date    Endometriosis        Past Surgical History:   Procedure Laterality Date    LEG TENDON SURGERY      WISDOM TOOTH EXTRACTION           Family History   Problem Relation Age of Onset    No Known Problems Mother     No Known Problems Father     No Known Problems Brother     No Known Problems Maternal Grandmother     No Known Problems Maternal Grandfather     Diabetes Paternal Grandmother     Lung disease Paternal Grandmother        Social History     Socioeconomic History    Marital status: Single     Spouse name: Not on file    Number of children: Not on file    Years of education: Not on file    Highest education level: Not on file   Occupational History    Not on file   Tobacco Use    Smoking status: Never    Smokeless tobacco: Never   Vaping Use    Vaping Use: Never used   Substance and Sexual Activity    Alcohol use: Yes     Comment: social     Drug use: Not Currently     Types: Marijuana    Sexual activity: Not Currently     Partners: Male     Birth control/protection: None   Other Topics Concern    Not on file   Social History Narrative    Not on file     Social Determinants of Health     Financial Resource Strain: Not on file   Food Insecurity: Not on file   Transportation Needs: Not on file   Physical Activity: Not on file   Stress: Not on file   Social Connections: Not on file   Intimate Partner Violence: Not on file   Housing Stability: Not on file       Allergies  Apple - food allergy, Other, and Prunus persica    Medications    Current Outpatient Medications:     albuterol (Ventolin HFA) 90 mcg/act inhaler, Inhale 2 puffs every 6 (six) hours as needed for wheezing, Disp: 18 g, Rfl: 0    methocarbamol (ROBAXIN) 500 mg tablet, Take 1 tablet (500 mg total) by mouth 3 (three) times a day as needed for muscle spasms, Disp: 20 tablet, Rfl: 0    norgestrel-ethinyl estradiol (Low-Ogestrel) 0.3 mg-30 mcg per tablet, Take 1 tablet by mouth daily, Disp: 84 tablet, Rfl: 1      Review of Systems  Review of Systems   Constitutional:  Negative for activity change, chills, fever and unexpected weight change. HENT:  Negative for congestion, ear pain, hearing loss and sore throat. Respiratory:  Negative for cough, chest tightness and shortness of breath. Cardiovascular:  Negative for chest pain and leg swelling. Gastrointestinal:  Negative for abdominal pain, constipation, diarrhea, nausea and vomiting. Genitourinary:  Negative for difficulty urinating, dysuria, frequency, menstrual problem, pelvic pain, vaginal discharge and vaginal pain. Per HPI    Skin:  Negative for color change and rash. Neurological:  Negative for dizziness, numbness and headaches. Psychiatric/Behavioral:  Negative for agitation and confusion.            Objective     /74 (BP Location: Right arm, Patient Position: Sitting, Cuff Size: Standard)   Ht 5' 5.9" (1.674 m)   Wt 78.2 kg (172 lb 6.4 oz)   LMP 10/17/2023   BMI 27.91 kg/m²       Physical Exam  Constitutional:       General: She is not in acute distress. Appearance: Normal appearance. She is normal weight. Genitourinary:   Breasts:     Breasts are symmetrical.      Breasts are soft. Right: No inverted nipple, mass or nipple discharge. Left: No inverted nipple, mass or nipple discharge. HENT:      Head: Normocephalic and atraumatic. Nose: Nose normal.   Eyes:      Conjunctiva/sclera: Conjunctivae normal.      Pupils: Pupils are equal, round, and reactive to light. Cardiovascular:      Rate and Rhythm: Normal rate. Pulses: Normal pulses. Pulmonary:      Effort: Pulmonary effort is normal.   Musculoskeletal:         General: Normal range of motion. Cervical back: Normal range of motion. Neurological:      General: No focal deficit present. Mental Status: She is alert and oriented to person, place, and time. Mental status is at baseline. Skin:     General: Skin is warm and dry. Psychiatric:         Mood and Affect: Mood normal.         Behavior: Behavior normal.         Thought Content: Thought content normal.         Judgment: Judgment normal.   Vitals and nursing note reviewed.

## 2023-11-24 ENCOUNTER — OFFICE VISIT (OUTPATIENT)
Dept: FAMILY MEDICINE CLINIC | Facility: CLINIC | Age: 23
End: 2023-11-24
Payer: COMMERCIAL

## 2023-11-24 VITALS
BODY MASS INDEX: 27.64 KG/M2 | WEIGHT: 172 LBS | OXYGEN SATURATION: 97 % | TEMPERATURE: 98 F | RESPIRATION RATE: 16 BRPM | DIASTOLIC BLOOD PRESSURE: 80 MMHG | HEART RATE: 104 BPM | HEIGHT: 66 IN | SYSTOLIC BLOOD PRESSURE: 126 MMHG

## 2023-11-24 DIAGNOSIS — J01.10 ACUTE NON-RECURRENT FRONTAL SINUSITIS: Primary | ICD-10-CM

## 2023-11-24 PROCEDURE — 99213 OFFICE O/P EST LOW 20 MIN: CPT | Performed by: FAMILY MEDICINE

## 2023-11-24 RX ORDER — AMOXICILLIN AND CLAVULANATE POTASSIUM 875; 125 MG/1; MG/1
1 TABLET, FILM COATED ORAL EVERY 12 HOURS SCHEDULED
Qty: 20 TABLET | Refills: 0 | Status: SHIPPED | OUTPATIENT
Start: 2023-11-24 | End: 2023-12-04

## 2023-11-24 RX ORDER — PREDNISONE 20 MG/1
20 TABLET ORAL DAILY
Qty: 5 TABLET | Refills: 0 | Status: SHIPPED | OUTPATIENT
Start: 2023-11-24 | End: 2023-11-29

## 2023-11-24 NOTE — ASSESSMENT & PLAN NOTE
- Has been having congestion for 4 to 5 days, Works at , having congestion, cough, and swelling around R maxillary sinus. Does have history of recurrent sinus infections. Noticed sinuses starting to swell yesterday.  -Denies fevers, chills, nausea, vomiting. Denies visual changes, ringing in her ears, ear pain or discharge.

## 2023-11-27 ENCOUNTER — OFFICE VISIT (OUTPATIENT)
Dept: DERMATOLOGY | Facility: CLINIC | Age: 23
End: 2023-11-27

## 2023-11-27 VITALS — WEIGHT: 172 LBS | HEIGHT: 66 IN | TEMPERATURE: 97.3 F | BODY MASS INDEX: 27.64 KG/M2

## 2023-11-27 DIAGNOSIS — D48.5 NEOPLASM OF UNCERTAIN BEHAVIOR OF SKIN: Primary | ICD-10-CM

## 2023-11-27 DIAGNOSIS — B07.9 VERRUCA VULGARIS: ICD-10-CM

## 2023-11-27 PROCEDURE — 88305 TISSUE EXAM BY PATHOLOGIST: CPT | Performed by: DERMATOLOGY

## 2023-11-27 NOTE — PATIENT INSTRUCTIONS
POST-PROCEDURAL CARE (WHAT YOU WILL NEED TO DO "AFTER THE BIOPSY" TO OPTIMIZE HEALING)    Keep the area clean and dry. Try NOT to remove the bandage or get it wet for the first 24 hours. Gently clean the area and apply surgical ointment (such as Vaseline petrolatum ointment, which is available "over the counter" and not a prescription) to the biopsy site for up to 2 weeks straight. This acts to protect the wound from the outside world. Sutures are not usually placed in this procedure. If any sutures were placed, return for suture removal as instructed (generally 1 week for the face, 2 weeks for the body). Take Acetaminophen (Tylenol) for discomfort, if no contraindications. Ibuprofen or aspirin could make bleeding worse. Call our office immediately for signs of infection: fever, chills, increased redness, warmth, tenderness, discomfort/pain, or pus or foul smell coming from the wound. WHAT TO DO IF THERE IS ANY BLEEDING? If a small amount of bleeding is noticed, place a clean cloth over the area and apply firm pressure for ten minutes. Check the wound after 10 minutes of direct pressure. If bleeding persists, try one more time for an additional 10 minutes of direct pressure on the area. If the bleeding becomes heavier or does not stop after the second attempt, or if you have any other questions about this procedure, then please call your [facility] by calling [phone] (SKIN).

## 2023-11-27 NOTE — PROGRESS NOTES
[facility] Note     Patient Name: Ginger Echavarria  Encounter Date: 11/27/2023     Have you been cared for by a [facility]ologist in the last 3 years and, if so, which description applies to you? NO. I am considered a "new" patient and must complete all patient intake questions. I am FEMALE/of child-bearing potential.    REVIEW OF SYSTEMS:  Have you recently had or currently have any of the following? Recent fever or chills? No  Any non-healing wound? No  Are you pregnant or planning to become pregnant? No  Are you currently or planning to be nursing or breast feeding? No   PAST MEDICAL HISTORY:  Have you personally ever had or currently have any of the following? If "YES," then please provide more detail. Skin cancer (such as Melanoma, Basal Cell Carcinoma, Squamous Cell Carcinoma? No  Tuberculosis, HIV/AIDS, Hepatitis B or C: No  Radiation Treatment No   HISTORY OF IMMUNOSUPPRESSION:   Do you have a history of any of the following:  Systemic Immunosuppression such as Diabetes, Biologic or Immunotherapy, Chemotherapy, Organ Transplantation, Bone Marrow Transplantation? No    Answering "YES" requires the addition of the dotphrase "IMMUNOSUPPRESSED" as the first diagnosis of the patient's visit. FAMILY HISTORY:  Any "first degree relatives" (parent, brother, sister, or child) with the following? Skin Cancer, Pancreatic or Other Cancer? No   PATIENT EXPERIENCE:    Do you want the Dermatologist to perform a COMPLETE skin exam today including a clinical examination under the "bra and underwear" areas? NO  If necessary, do we have your permission to call and leave a detailed message on your Preferred Phone number that includes your specific medical information?   Yes      Allergies   Allergen Reactions    Apple - Food Allergy     Other Other (See Comments)     Per patient, itchy eyes, stuffy nose & sneezing.--Cats    Prunus Persica       Current Outpatient Medications: amoxicillin-clavulanate (AUGMENTIN) 875-125 mg per tablet, Take 1 tablet by mouth every 12 (twelve) hours for 10 days, Disp: 20 tablet, Rfl: 0    norgestrel-ethinyl estradiol (Low-Ogestrel) 0.3 mg-30 mcg per tablet, Take 1 tablet by mouth daily, Disp: 84 tablet, Rfl: 1    predniSONE 20 mg tablet, Take 1 tablet (20 mg total) by mouth daily for 5 days, Disp: 5 tablet, Rfl: 0    albuterol (Ventolin HFA) 90 mcg/act inhaler, Inhale 2 puffs every 6 (six) hours as needed for wheezing (Patient not taking: Reported on 11/24/2023), Disp: 18 g, Rfl: 0    methocarbamol (ROBAXIN) 500 mg tablet, Take 1 tablet (500 mg total) by mouth 3 (three) times a day as needed for muscle spasms (Patient not taking: Reported on 11/27/2023), Disp: 20 tablet, Rfl: 0          Whom besides the patient is providing clinical information about today's encounter? NO ADDITIONAL HISTORIAN (patient alone provided history)    Physical Exam and Assessment/Plan by Diagnosis:    NEOPLASM OF UNCERTAIN BEHAVIOR OF SKIN    Physical Exam:  (Anatomic Location); (Size and Morphological Description); (Differential Diagnosis):  Right ankle 6 mm pink scaly papule. Differential diagnosis: Basal cell carcinoma vs squamous cell carcinoma vs actinic keratosis vs verruca vulgaris     Pertinent Positives:  Pertinent Negatives: Additional History of Present Condition:  Present for several months     Assessment and Plan: Likely verruca vulgaris but shave removal today to confirm. Base of lesion treated with cryotherapy to try to prevent recurrence which was discussed as a common risk after shave removal of a wart. See below procedure note for cryotherapy. I have discussed with the patient that a sample of skin via a "skin biopsy” would be potentially helpful to further make a specific diagnosis under the microscope.   Based on a thorough discussion of this condition and the management approach to it (including a comprehensive discussion of the known risks, side effects and potential benefits of treatment), the patient (family) agrees to implement the following specific plan:    Procedure:  Skin Biopsy. After a thorough discussion of treatment options and risk/benefits/alternatives (including but not limited to local pain, scarring, dyspigmentation, blistering, possible superinfection, and inability to confirm a diagnosis via histopathology), verbal and written consent were obtained and portion of the rash was biopsied for tissue sample. See below for consent that was obtained from patient and subsequent Procedure Note. PROCEDURE TANGENTIAL (SHAVE) BIOPSY NOTE:    Performing Physician: Santosh Lorenz  Anatomic Location; Clinical Description with size (cm); Pre-Op Diagnosis:   Right ankle 6 mm pink scaly papule. Differential diagnosis: Basal cell carcinoma vs squamous cell carcinoma vs actinic keratosis vs verruca vulgaris   Post-op diagnosis: Same     Local anesthesia: 1% xylocaine with epi      Topical anesthesia: None    Hemostasis: Aluminum chloride       After obtaining informed consent  at which time there was a discussion about the purpose of biopsy  and low risks of infection and bleeding. The area was prepped and draped in the usual fashion. Anesthesia was obtained with 1% lidocaine with epinephrine. A shave biopsy to an appropriate sampling depth was obtained by Shave (Dermablade or 15 blade) The resulting wound was covered with surgical ointment and bandaged appropriately. The patient tolerated the procedure well without complications and was without signs of functional compromise. Specimen has been sent for review by Dermatopathology. Standard post-procedure care has been explained and has been included in written form within the patient's copy of Informed Consent.     INFORMED CONSENT DISCUSSION AND POST-OPERATIVE INSTRUCTIONS FOR PATIENT    I.  RATIONALE FOR PROCEDURE  I understand that a skin biopsy allows the Dermatologist to test a lesion or rash under the microscope to obtain a diagnosis. It usually involves numbing the area with numbing medication and removing a small piece of skin; sometimes the area will be closed with sutures. In this specific procedure, sutures are not usually needed. If any sutures are placed, then they are usually need to be removed in 2 weeks or less. I understand that my Dermatologist recommends that a skin "shave" biopsy be performed today. A local anesthetic, similar to the kind that a dentist uses when filling a cavity, will be injected with a very small needle into the skin area to be sampled. The injected skin and tissue underneath "will go to sleep” and become numb so no pain should be felt afterwards. An instrument shaped like a tiny "razor blade" (shave biopsy instrument) will be used to cut a small piece of tissue and skin from the area so that a sample of tissue can be taken and examined more closely under the microscope. A slight amount of bleeding will occur, but it will be stopped with direct pressure and a pressure bandage and any other appropriate methods. I understands that a scar will form where the wound was created. Surgical ointment will be applied to help protect the wound. Sutures are not usually needed. II.  RISKS AND POTENTIAL COMPLICATIONS   I understand the risks and potential complications of a skin biopsy include but are not limited to the following:  Bleeding  Infection  Pain  Scar/keloid  Skin discoloration  Incomplete Removal  Recurrence  Nerve Damage/Numbness/Loss of Function  Allergic Reaction to Anesthesia  Biopsies are diagnostic procedures and based on findings additional treatment or evaluation may be required  Loss or destruction of specimen resulting in no additional findings    My Dermatologist has explained to me the nature of the condition, the nature of the procedure, and the benefits to be reasonably expected compared with alternative approaches.   My Dermatologist has discussed the likelihood of major risks or complications of this procedure including the specific risks listed above, such as bleeding, infection, and scarring/keloid. I understand that a scar is expected after this procedure. I understand that my physician cannot predict if the scar will form a "keloid," which extends beyond the borders of the wound that is created. A keloid is a thick, painful, and bumpy scar. A keloid can be difficult to treat, as it does not always respond well to therapy, which includes injecting cortisone directly into the keloid every few weeks. While this usually reduces the pain and size of the scar, it does not eliminate it. I understand that photographs may be taken before and after the procedure. These will be maintained as part of the medical providers confidential records and may not be made available to me. I further authorize the medical provider to use the photographs for teaching purposes or to illustrate scientific papers, books, or lectures if in his/her judgment, medical research, education, or science may benefit from its use. I have had an opportunity to fully inquire about the risks and benefits of this procedure and its alternatives. I have been given ample time and opportunity to ask questions and to seek a second opinion if I wished to do so. I acknowledge that there have specifically been no guarantees as to the cosmetic results from the procedure. I am aware that with any procedure there is always the possibility of an unexpected complication. III. POST-PROCEDURAL CARE (WHAT YOU WILL NEED TO DO "AFTER THE BIOPSY" TO OPTIMIZE HEALING)    Keep the area clean and dry. Try NOT to remove the bandage or get it wet for the first 24 hours. Gently clean the area and apply surgical ointment (such as Vaseline petrolatum ointment, which is available "over the counter" and not a prescription) to the biopsy site for up to 2 weeks straight.   This acts to protect the wound from the outside world. Sutures are not usually placed in this procedure. If any sutures were placed, return for suture removal as instructed (generally 1 week for the face, 2 weeks for the body). Take Acetaminophen (Tylenol) for discomfort, if no contraindications. Ibuprofen or aspirin could make bleeding worse. Call our office immediately for signs of infection: fever, chills, increased redness, warmth, tenderness, discomfort/pain, or pus or foul smell coming from the wound. WHAT TO DO IF THERE IS ANY BLEEDING? If a small amount of bleeding is noticed, place a clean cloth over the area and apply firm pressure for ten minutes. Check the wound after 10 minutes of direct pressure. If bleeding persists, try one more time for an additional 10 minutes of direct pressure on the area. If the bleeding becomes heavier or does not stop after the second attempt, or if you have any other questions about this procedure, then please call your [facility] by calling [phone] (SKIN). I hereby acknowledge that I have reviewed and verified the site with my Dermatologist and have requested and authorized my Dermatologist to proceed with the procedure. VERRUCA VULGARIS ("Common Warts")    Physical Exam:  Anatomic Location Affected:  Left knee   Morphological Description:  2 mm thin verrucous papule  Pertinent Positives:  Pertinent Negatives: Additional History of Present Condition:  Several weeks; newer than right ankle lesion. Assessment and Plan:  Based on a thorough discussion of this condition and the management approach to it (including a comprehensive discussion of the known risks, side effects and potential benefits of treatment), the patient (family) agrees to implement the following specific plan:  Cryo done today in office    Verruca Vulgaris  A verruca is a common growth of the skin caused by infection by human papilloma virus (HPV).  There are many strains of the virus that cause different types of warts on the body. The virus infects the most superficial layers of the skin, causing increased production of skin cells and thickening. Warts can be spread through direct contact with infected skin and may spread to other parts of the body if scratched or picked. A verruca is more commonly called a "wart." Warts are particularly common in school-aged children but can arise at any age. Patients who have a history of eczema are especially prone due to impaired skin barrier. Those taking immunosuppressive drugs or with HIV infections may experience prolonged symptoms despite treatment. Warts generally have a rough surface with a tiny black dot sometimes observed in the middle of each scaly spot. They can range in size from a small bump to large scaly growths. Common warts are often found on the backs of fingers or toes, around the nails, and on the knees. Plantar warts can grow inwardly on the soles of the feet causing pain. There are many possible ways to treat warts and sometimes several different treatments are needed to get the warts to go away completely. There is no single perfect treatment for warts, and successful treatment can take many months. In-office treatments usually require multiple visits, and include:  Cryotherapy. a cold spray with liquid nitrogen will destroy the infected cells but may lead to discomfort and blistering. It may also leave a permanent white dagoberto or scar. Electrosurgery (curettage and cautery) can be used for large resistant warts which involves shaving the growth down and burning the base. It is performed under local anesthesia and may leave a permanent scar    Candida (“yeast”) antigen injections. These are extracts of the common yeast (Candida) that cannot cause an infection. The medication is injected into/under the wart. It is thought to stimulate the immune system to recognize the wart virus and attack it. Multiple injections are often needed about one month apart. There are also several at-home wart treatments:    Soak the warts in warm water for 5 minutes every night followed by gentle filing with a nail file or pumice stone. Topical salicylic acid or similar compounds work by removing the dead surface skin cells  Apply the medicine directly to the wart, wait for it to dry completely, then cover with duct tape overnight   Repeat until the wart is gone, which can take 2-4 months  Do not use on the face or groin area   If the wart paint makes the skin sore, stop treatment until the discomfort has settled, then recommence as above. Take care to keep the chemical off normal skin. Podophyllin is a cytotoxic agent used in some products and must not be used in pregnancy or women considering pregnancy. Some prescription medications include   Topical retinoids (adapalene, tretinoin, tazarotene), 5-fluorouracil (Efudex) or imiquimod (Aldara) creams are sometimes used to treat flat warts or warts on the face and other sensitive anatomical areas. They are usually applied directly to the warts once a day for 2-4 months and can be irritating. These treatments should only be used as directed by your health care provider. Systemic treatment with oral cimetidine (Tagamet) may help boost the immune system against the wart virus in patients, some of the time. Initiation of cimetidine therapy should ONLY be done under the supervision of your health care provider, who can discuss possible side effects and drug-to-drug interactions of this specific treatment.       PROCEDURE:  DESTRUCTION OF BENIGN LESIONS WITH CRYOTHERAPY  After a thorough discussion of treatment options and risk/benefits/alternatives (including but not limited to local pain, scarring, dyspigmentation, blistering, and possible superinfection), verbal and written consent were obtained and the aforementioned lesions were treated on with cryotherapy using liquid nitrogen x 1 cycle for 5-10 seconds. TOTAL NUMBER of 2 lesions were treated today on the ANATOMIC LOCATION: Left knee, right ankle. The patient tolerated the procedure well, and after-care instructions were provided. NEVUS    Physical Exam:  Anatomic Location Affected:  Left frontal crown  Morphological Description:  3 mm round medium brown thin papule with reassuring dermoscopy  Onset:  Recent Changes noted by patient:    Additional History of Present Condition:  Patient noticed in the last week. Assessment and Plan:  Based on a thorough discussion of this condition and the management approach to it (including a comprehensive discussion of the known risks, side effects and potential benefits of treatment), the patient (family) agrees to implement the following specific plan:  Benign; reassured  Monitor for changes and contact me if so for recheck and possible biopsy     Melanocytic Nevi  Melanocytic nevi ("moles") are caused by collections of the color producing skin cells, or melanocytes, in 1 area in the skin. They can range in color from pink to dark brown and be either raised or flat. Some moles are present at birth (I.e., "congenital nevi"), while others come up later in life (i.e., "acquired nevi"). Staci Dancer exposure also stimulates the body to make more moles, ie the more sun you get the more moles you'll grow. Clinically distinguishing a healthy mole from melanoma may be difficult. The "ABCDE's" of moles have been suggested as a means of helping to alert a person to a suspicious mole and the possible increased risk of melanoma. Asymmetry: Healthy moles tend to be symmetric, while melanomas are often asymmetric.   Asymmetry means if you draw a line through the mole, the two halves do not match in color, size, shape, or surface texture Any mole that starts to demonstrate "asymmetry" should be examined promptly by a board certified dermatologist.     Golden Petite: Healthy moles tend to have discrete, even borders. The border of a melanoma often blends into the normal skin and does not sharply delineate the mole from normal skin. Any mole that starts to demonstrate "uneven borders" should be examined promptly     Color: Healthy moles tend to be one color throughout. Melanomas tend to be made up of different colors ranging from dark black, blue, white, or red. Any mole that demonstrates a color change should be examined promptly    Diameter: Healthy moles tend to be smaller than 0.6 cm in size; an exception are "congenital nevi" that can be larger. Melanomas tend to grow and can often be greater than 0.6 cm (1/4 of an inch, or the size of a pencil eraser). This is only a guideline, and many normal moles may be larger than 0.6 cm without being unhealthy. Any mole that starts to change in size (small to bigger or bigger to smaller) should be examined promptly    Evolving: Healthy moles tend to "stay the same."  Melanomas may often show signs of change or evolution such as a change in size, shape, color, or elevation. Any mole that starts to itch, bleed, crust, burn, hurt, or ulcerate or demonstrate a change or evolution should be examined promptly by a board certified dermatologist.      What are atypical moles or dysplastic nevi? Dysplastic moles are moles that have some of the ABCDE  changes listed above but  are not cancerous  Sometimes a biopsy and microscopic examination are needed to determine the difference. They may indicate an increased risk of melanoma in that person, especially if there is a family history of melanoma. What is a Melanoma? The main concern when looking at a new or changing mole it to evaluate whether it may be a melanoma. The appearance of a "new mole" remains one of the most reliable methods for identifying a malignant melanoma. A melanoma is a type of skin cancer that can be deadly if it spreads throughout the body.  The prognosis of a Melanoma depends on how deep it has penetrated in the skin. If caught early, they generally will not have had time to grow into the deeper layers of the skin and they cure rate is then very high. Once the melanoma grows deeper into the skin, the cure rate drops dramatically. Therefore, early detection and removal of a malignant melanoma results in a much better chance of complete cure.              Scribe Attestation      I,:  Liliam Anguiano MA am acting as a scribe while in the presence of the attending physician.:       I,:  Michael De Jesus MD personally performed the services described in this documentation    as scribed in my presence.:

## 2023-11-29 ENCOUNTER — TELEPHONE (OUTPATIENT)
Dept: OTHER | Facility: OTHER | Age: 23
End: 2023-11-29

## 2023-11-29 NOTE — TELEPHONE ENCOUNTER
Patient is calling regarding cancelling an appointment.     Date/Time: 11/29/2023 10:00 am    Patient was rescheduled: YES [] NO [x]    Patient requesting call back to reschedule: YES [] NO [x]

## 2023-12-04 PROCEDURE — 88305 TISSUE EXAM BY PATHOLOGIST: CPT | Performed by: DERMATOLOGY

## 2023-12-04 NOTE — RESULT ENCOUNTER NOTE
DERMATOPATHOLOGY RESULT NOTE    Results reviewed by ordering physician. Called patient to personally discuss results. Discussed results with patient. Instructions for Clinical Derm Team:   (remember to route Result Note to appropriate staff):    None    Result & Plan by Specimen:    Specimen A: benign verruca  Plan: monitor and reassured, benign. She will contact me if lesion recurs to consider further therapy      Tissue Exam: [id number]  Order: [id number]  Status: Final result      Visible to patient: Yes (not seen)      Dx: Neoplasm of uncertain behavior of skin    0 Result Notes     Component   Case Report  Surgical Pathology Report                         Case: [id number]                                  Authorizing Provider: Emily Galarza MD     Collected:           11/27/2023 7050              Ordering Location:     [facility]      Received:            11/27/2023 [place]                                                                Pathologist:           Emily Galarza MD                                                      Specimen:    Skin, Other, Specimen A; right ankle                                                    Final Diagnosis  A. 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     Electronically signed by Emily Galarza MD on 12/4/2023 at  9:12 AM  Preliminary result electronically signed by Emily Galarza MD on 12/1/2023 at  9:49 AM

## 2023-12-13 ENCOUNTER — PROCEDURE VISIT (OUTPATIENT)
Age: 23
End: 2023-12-13
Payer: COMMERCIAL

## 2023-12-13 VITALS
WEIGHT: 172 LBS | BODY MASS INDEX: 27.64 KG/M2 | DIASTOLIC BLOOD PRESSURE: 75 MMHG | HEIGHT: 66 IN | SYSTOLIC BLOOD PRESSURE: 112 MMHG

## 2023-12-13 DIAGNOSIS — M79.18 DIFFUSE MYOFASCIAL PAIN SYNDROME: ICD-10-CM

## 2023-12-13 DIAGNOSIS — S29.019A THORACIC MYOFASCIAL STRAIN, INITIAL ENCOUNTER: Primary | ICD-10-CM

## 2023-12-13 DIAGNOSIS — M99.01 SEGMENTAL DYSFUNCTION OF CERVICAL REGION: ICD-10-CM

## 2023-12-13 DIAGNOSIS — M99.03 SEGMENTAL DYSFUNCTION OF LUMBAR REGION: ICD-10-CM

## 2023-12-13 DIAGNOSIS — M99.04 SEGMENTAL DYSFUNCTION OF SACRAL REGION: ICD-10-CM

## 2023-12-13 DIAGNOSIS — M99.02 SEGMENTAL DYSFUNCTION OF THORACIC REGION: ICD-10-CM

## 2023-12-13 PROCEDURE — 97110 THERAPEUTIC EXERCISES: CPT | Performed by: CHIROPRACTOR

## 2023-12-13 PROCEDURE — 98941 CHIROPRACT MANJ 3-4 REGIONS: CPT | Performed by: CHIROPRACTOR

## 2023-12-13 NOTE — PROGRESS NOTES
Diagnoses and all orders for this visit:    Thoracic myofascial strain, initial encounter    Segmental dysfunction of lumbar region    Segmental dysfunction of cervical region    Diffuse myofascial pain syndrome    Segmental dysfunction of thoracic region    Segmental dysfunction of sacral region    Pt suffered exacerbation but responded to tx with reduced pain and increased ROM    TREATMENT: 32362,22733  Ther-ex: IASTM - discussed post procedure soreness and/or ecchymosis for up to 36 hrs, applied to affected mm hypertonicities; wall sarah, axial retraction, upper trap stretch, lev scap stretch, SCM stretch, side laying QL stretch, single knee to chest stretch, lat rows with t-band, lat pull down with t-band, abdominal bracing; greater than 15 min spent performing above mentioned ther-ex. Thoracic mobilization/manipulation: prone P-A mob, supine A-P manip; cervical mobilization/manipulation: diversified supine graded mobilization, cervical traction; R SIJ LAT and crespo drop table maneuver; lumbar supine diversified rotary mobilization B    HPI:  Sarah Rubio is a 26 y.o. female   Chief Complaint   Patient presents with    Neck Pain     Not too bad no pain     Back Pain     Low back about a 6      Pt presents for tx of chronic intermittent neck/back pain. Pt reports hx of 4 concussions; whiplash associuated; c/s xrays and MRI demonstrate straightening and grade 1 listhesis C3/4/5. Pt underwent PT for neck 3 times without benefit. Pt lifts weights 5 days/wk  12/13: Pt reports feeling and moving better since last but suffered flare-up working 9 hr days at  facility; has been going to gym less    Neck Pain   This is a chronic problem. The current episode started more than 1 year ago. The problem occurs constantly. The problem has been waxing and waning. The pain is present in the midline and right side. The quality of the pain is described as aching and shooting. Pain scale: 0-2/10.  The symptoms are aggravated by stress and position (crunches, mvmt of neck; palliative includes massage, chiro care, laying on neck support). Worse during: worse end of day. Stiffness is present All day. Associated symptoms include leg pain. Back Pain  This is a chronic problem. The current episode started more than 1 year ago. The problem occurs constantly. The pain is present in the lumbar spine, sacro-iliac, thoracic spine and gluteal. The quality of the pain is described as aching. The pain radiates to the right thigh and right knee. Pain scale: 0-6/10. Worse during: worse end of day. The symptoms are aggravated by bending and stress. Stiffness is present All day. Associated symptoms include leg pain. Leg Pain     Hip Pain       Past Medical History:   Diagnosis Date    Endometriosis       The following portions of the patient's history were reviewed and updated as appropriate: allergies, past family history, past medical history, past social history, past surgical history, and problem list.  Review of Systems   Musculoskeletal:  Positive for back pain and neck pain. Physical Exam  Neck:        Comments: Pnful and limited in ext/rrot  Musculoskeletal:      Cervical back: Pain with movement and muscular tenderness present. Decreased range of motion. Thoracic back: Spasms and tenderness present. Decreased range of motion. Lumbar back: Spasms and tenderness present. Decreased range of motion. Negative right straight leg raise test and negative left straight leg raise test.        Back:       Comments: Pnful and limited in Ext in R SIJ, Rlf    Lymphadenopathy:      Cervical: No cervical adenopathy. Skin:     General: Skin is warm and dry. Neurological:      Mental Status: She is alert and oriented to person, place, and time. Gait: Gait is intact.    Psychiatric:         Mood and Affect: Mood and affect normal.         Behavior: Behavior normal.       SOFT TISSUE ASSESSMENT: Hypertonicity and tenderness palpated B C5-T6 erector spinae, R upper traps, R lev scap, R SCM, R rhomboid, R QL, R glute med/min, R hip flexor JOINT RECTRICTIONS: C5-T6, L2-S1 and R SIJ   Return in about 1 week (around 12/20/2023) for Next scheduled follow up.

## 2023-12-14 NOTE — PROGRESS NOTES
Name: Sharon Valiente      : 2000      MRN: [id number]  Encounter Provider: Yuly Lamb DO  Encounter Date: 2023   Encounter department: [place] [address]. Acute non-recurrent frontal sinusitis  Assessment & Plan:  - Has been having congestion for 4 to 5 days, Works at , having congestion, cough, and swelling around R maxillary sinus. Does have history of recurrent sinus infections. Noticed sinuses starting to swell yesterday.  -Denies fevers, chills, nausea, vomiting. Denies visual changes, ringing in her ears, ear pain or discharge. Orders:  -     amoxicillin-clavulanate (AUGMENTIN) 875-125 mg per tablet; Take 1 tablet by mouth every 12 (twelve) hours for 10 days  -     predniSONE 20 mg tablet; Take 1 tablet (20 mg total) by mouth daily for 5 days      BMI Counseling: Body mass index is 27.85 kg/m². The BMI is above normal. Nutrition recommendations include decreasing portion sizes, encouraging healthy choices of fruits and vegetables, decreasing fast food intake, limiting drinks that contain sugar, moderation in carbohydrate intake, increasing intake of lean protein and reducing intake of cholesterol. Exercise recommendations include moderate physical activity 150 minutes/week, exercising 3-5 times per week, obtaining a gym membership and strength training exercises. No pharmacotherapy was ordered. Patient referred to PCP. Rationale for BMI follow-up plan is due to patient being overweight or obese. Yaneli Durham is a 25-year-old female who presents today for evaluation of 4 to 5 days of congestion. She notes yesterday started having swelling in her sinuses. She does have a history of recurrent sinusitis where she swells up like this. She does relate that her mucus is changed to a yellow to greenish color yesterday. Denies any visual changes, ringing in ears, nausea, vomiting, diarrhea.   Does work at  and has been around sick kids pretty consistently. Did take at home COVID test which was negative. Sinus Problem  Associated symptoms include congestion and sinus pressure. Pertinent negatives include no chills, coughing, ear pain, shortness of breath or sore throat. Review of Systems   Constitutional:  Negative for chills and fever. HENT:  Positive for congestion, sinus pressure and sinus pain. Negative for ear pain and sore throat. Eyes:  Negative for pain and visual disturbance. Respiratory:  Negative for cough and shortness of breath. Cardiovascular:  Negative for chest pain and palpitations. Gastrointestinal:  Negative for abdominal pain and vomiting. Genitourinary:  Negative for dysuria and hematuria. Musculoskeletal:  Negative for arthralgias and back pain. Skin:  Negative for color change and rash. Neurological:  Negative for seizures and syncope. Psychiatric/Behavioral:  Negative for confusion and sleep disturbance. The patient is not nervous/anxious. All other systems reviewed and are negative.       Past Medical History:   Diagnosis Date    Endometriosis      Past Surgical History:   Procedure Laterality Date    LEG TENDON SURGERY      WISDOM TOOTH EXTRACTION       Family History   Problem Relation Age of Onset    No Known Problems Mother     No Known Problems Father     No Known Problems Brother     No Known Problems Maternal Grandmother     No Known Problems Maternal Grandfather     Diabetes Paternal Grandmother     Lung disease Paternal Grandmother      Social History     Socioeconomic History    Marital status: Single     Spouse name: None    Number of children: None    Years of education: None    Highest education level: None   Occupational History    None   Tobacco Use    Smoking status: Never    Smokeless tobacco: Never   Vaping Use    Vaping status: Never Used   Substance and Sexual Activity    Alcohol use: Yes     Comment: social     Drug use: Not Currently     Types: Marijuana    Sexual activity: Not Currently     Partners: Male     Birth control/protection: None   Other Topics Concern    None   Social History Narrative    None     Social Determinants of Health     Financial Resource Strain: Not on file   Food Insecurity: Not on file   Transportation Needs: Not on file   Physical Activity: Not on file   Stress: Not on file   Social Connections: Not on file   Intimate Partner Violence: Not on file   Housing Stability: Not on file     Current Outpatient Medications on File Prior to Visit   Medication Sig    methocarbamol (ROBAXIN) 500 mg tablet Take 1 tablet (500 mg total) by mouth 3 (three) times a day as needed for muscle spasms (Patient not taking: Reported on 11/27/2023)    norgestrel-ethinyl estradiol (Low-Ogestrel) 0.3 mg-30 mcg per tablet Take 1 tablet by mouth daily    albuterol (Ventolin HFA) 90 mcg/act inhaler Inhale 2 puffs every 6 (six) hours as needed for wheezing (Patient not taking: Reported on 11/24/2023)     Allergies   Allergen Reactions    Apple - Food Allergy     Other Other (See Comments)     Per patient, itchy eyes, stuffy nose & sneezing.--Cats    Prunus Persica      Immunization History   Administered Date(s) Administered    COVID-19 PFIZER VACCINE 0.3 ML IM 02/19/2021, 04/19/2021, 05/10/2021    DTaP / HiB 2001    DTaP / HiB / IPV 2001, 2001    DTaP 5 06/27/2002, 05/09/2006    HPV9 06/08/2021, 06/08/2021, 07/27/2021, 07/27/2021, 12/20/2021    Hep A, adult 05/10/2007, 05/12/2008    Hep B, adult 2001, 01/02/2002, 06/27/2002    Hib (PRP-OMP) 03/27/2002    INFLUENZA 2001, 11/20/2010, 11/03/2014, 02/19/2016, 01/30/2018    IPV 06/27/2002, 05/09/2006    Influenza, seasonal, injectable 11/23/2013    MMR 03/27/2002, 04/25/2005    Meningococcal MCV4P 10/18/2017    Meningococcal, Unknown Serogroups 05/14/2012    Pneumococcal Conjugate PCV 7 2001, 2001, 2001, 03/27/2002    Tdap 05/14/2012, 06/20/2022    Tuberculin Skin Test-PPD Intradermal 06/20/2022    Varicella 03/27/2002, 05/10/2007       Objective     /80 (BP Location: Left arm, Patient Position: Sitting, Cuff Size: Standard)   Pulse 104   Temp 98 °F (36.7 °C) (Tympanic)   Resp 16   Ht 5' 5.9" (1.674 m)   Wt 78 kg (172 lb)   LMP 10/17/2023   SpO2 97%   BMI 27.85 kg/m²     Physical Exam  Vitals and nursing note reviewed. Constitutional:       Appearance: Normal appearance. HENT:      Head: Normocephalic and atraumatic. Comments: Frontal and maxillary tenderness to palpation and swelling noted. Right Ear: Tympanic membrane and external ear normal.      Left Ear: Tympanic membrane and external ear normal.      Nose: Congestion present. Mouth/Throat:      Mouth: Mucous membranes are moist.      Pharynx: Posterior oropharyngeal erythema present. Eyes:      Extraocular Movements: Extraocular movements intact. Conjunctiva/sclera: Conjunctivae normal.      Pupils: Pupils are equal, round, and reactive to light. Cardiovascular:      Rate and Rhythm: Normal rate and regular rhythm. Pulses: Normal pulses. Heart sounds: Normal heart sounds. Pulmonary:      Effort: Pulmonary effort is normal.      Breath sounds: Normal breath sounds. Abdominal:      General: Bowel sounds are normal.      Palpations: Abdomen is soft. Musculoskeletal:         General: Normal range of motion. Cervical back: Normal range of motion. Lymphadenopathy:      Cervical: No cervical adenopathy. Skin:     General: Skin is warm. Capillary Refill: Capillary refill takes less than 2 seconds. Neurological:      General: No focal deficit present. Mental Status: She is alert and oriented to person, place, and time.    Psychiatric:         Mood and Affect: Mood normal.         Behavior: Behavior normal.       Nicolas King DO

## 2024-01-08 ENCOUNTER — PROCEDURE VISIT (OUTPATIENT)
Age: 24
End: 2024-01-08
Payer: COMMERCIAL

## 2024-01-08 VITALS
DIASTOLIC BLOOD PRESSURE: 75 MMHG | WEIGHT: 172 LBS | BODY MASS INDEX: 27.64 KG/M2 | SYSTOLIC BLOOD PRESSURE: 106 MMHG | HEART RATE: 69 BPM | HEIGHT: 66 IN

## 2024-01-08 DIAGNOSIS — S29.019A THORACIC MYOFASCIAL STRAIN, INITIAL ENCOUNTER: Primary | ICD-10-CM

## 2024-01-08 DIAGNOSIS — M79.18 DIFFUSE MYOFASCIAL PAIN SYNDROME: ICD-10-CM

## 2024-01-08 DIAGNOSIS — M99.02 SEGMENTAL DYSFUNCTION OF THORACIC REGION: ICD-10-CM

## 2024-01-08 DIAGNOSIS — M99.03 SEGMENTAL DYSFUNCTION OF LUMBAR REGION: ICD-10-CM

## 2024-01-08 DIAGNOSIS — M99.04 SEGMENTAL DYSFUNCTION OF SACRAL REGION: ICD-10-CM

## 2024-01-08 DIAGNOSIS — M99.01 SEGMENTAL DYSFUNCTION OF CERVICAL REGION: ICD-10-CM

## 2024-01-08 PROCEDURE — 97110 THERAPEUTIC EXERCISES: CPT | Performed by: CHIROPRACTOR

## 2024-01-08 PROCEDURE — 98941 CHIROPRACT MANJ 3-4 REGIONS: CPT | Performed by: CHIROPRACTOR

## 2024-01-08 NOTE — PROGRESS NOTES
Diagnoses and all orders for this visit:    Thoracic myofascial strain, initial encounter    Segmental dysfunction of lumbar region    Segmental dysfunction of cervical region    Diffuse myofascial pain syndrome    Segmental dysfunction of thoracic region    Segmental dysfunction of sacral region    Pt suffered exacerbation but responded to tx with reduced pain and increased ROM    TREATMENT: 96840,[id number]  Ther-ex: IASTM - discussed post procedure soreness and/or ecchymosis for up to 36 hrs, applied to affected mm hypertonicities; wall sarah, axial retraction, upper trap stretch, lev scap stretch, SCM stretch, side laying QL stretch, single knee to chest stretch, lat rows with t-band, lat pull down with t-band, abdominal bracing; greater than 15 min spent performing above mentioned ther-ex. Thoracic mobilization/manipulation: prone P-A mob, supine A-P manip; cervical mobilization/manipulation: diversified supine graded mobilization, cervical traction; R SIJ LAT and crespo drop table maneuver; lumbar supine diversified rotary mobilization B    HPI:  Madeleine Mccoy is a 23 y.o. female   Chief Complaint   Patient presents with    Neck - Pain     Neck is tight. Pain score 3    Back Pain     Lower lumbar is very tight and dull pain. Pain score 6     Pt presents for tx of chronic intermittent neck/back pain. Pt reports hx of 4 concussions; whiplash associuated; c/s xrays and MRI demonstrate straightening and grade 1 listhesis C3/4/5. Pt underwent PT for neck 3 times without benefit. Pt lifts weights 5 days/wk  1/8: Pt reports flare-up from shoveling snow and utilizing barbell with squats instead of smith machine    Neck Pain   This is a chronic problem. The current episode started more than 1 year ago. The problem occurs constantly. The problem has been waxing and waning. The pain is present in the midline and right side. The quality of the pain is described as aching and shooting. Pain scale: 0-6/10. The  symptoms are aggravated by stress and position (crunches, mvmt of neck; palliative includes massage, chiro care, laying on neck support). Worse during: worse end of day. Stiffness is present All day. Associated symptoms include leg pain.   Back Pain  This is a chronic problem. The current episode started more than 1 year ago. The problem occurs constantly. The pain is present in the lumbar spine, sacro-iliac, thoracic spine and gluteal. The quality of the pain is described as aching. The pain radiates to the right thigh and right knee. Pain scale: 0-6/10. Worse during: worse end of day. The symptoms are aggravated by bending and stress. Stiffness is present All day. Associated symptoms include leg pain.   Leg Pain     Hip Pain       Past Medical History:   Diagnosis Date    Endometriosis       The following portions of the patient's history were reviewed and updated as appropriate: allergies, past family history, past medical history, past social history, past surgical history, and problem list.  Review of Systems   Musculoskeletal:  Positive for back pain and neck pain.     Physical Exam  Neck:        Comments: Pnful and limited in ext/rrot  Musculoskeletal:      Cervical back: Pain with movement and muscular tenderness present. Decreased range of motion.      Thoracic back: Spasms and tenderness present. Decreased range of motion.      Lumbar back: Spasms and tenderness present. Decreased range of motion. Negative right straight leg raise test and negative left straight leg raise test.        Back:       Comments: Pnful and limited in Ext in R SIJ, Rlf    Lymphadenopathy:      Cervical: No cervical adenopathy.   Skin:     General: Skin is warm and dry.   Neurological:      Mental Status: She is alert and oriented to person, place, and time.      Gait: Gait is intact.   Psychiatric:         Mood and Affect: Mood and affect normal.         Behavior: Behavior normal.       SOFT TISSUE ASSESSMENT: Hypertonicity and  tenderness palpated B C5-T6 erector spinae, R upper traps, R lev scap, R SCM, R rhomboid, R QL, R glute med/min, R hip flexor JOINT RECTRICTIONS: C5-T6, L2-S1 and R SIJ   Return in about 1 week (around 1/15/2024) for Next scheduled follow up.

## 2024-01-16 DIAGNOSIS — Z30.011 ENCOUNTER FOR INITIAL PRESCRIPTION OF CONTRACEPTIVE PILLS: ICD-10-CM

## 2024-01-16 RX ORDER — NORGESTREL AND ETHINYL ESTRADIOL 0.3-0.03MG
1 KIT ORAL DAILY
Qty: 84 TABLET | Refills: 1 | Status: SHIPPED | OUTPATIENT
Start: 2024-01-16

## 2024-01-16 NOTE — TELEPHONE ENCOUNTER
Pt called for a refill of her bc sent to the [facility](now on file). Pt has her annual scheduled on 1/30/24 with you and would like a refill until her appointment.

## 2024-01-23 PROBLEM — J01.10 ACUTE NON-RECURRENT FRONTAL SINUSITIS: Status: RESOLVED | Noted: 2023-11-24 | Resolved: 2024-01-23

## 2024-01-30 ENCOUNTER — ANNUAL EXAM (OUTPATIENT)
Dept: OBGYN CLINIC | Facility: CLINIC | Age: 24
End: 2024-01-30
Payer: COMMERCIAL

## 2024-01-30 VITALS — WEIGHT: 175 LBS | SYSTOLIC BLOOD PRESSURE: 102 MMHG | DIASTOLIC BLOOD PRESSURE: 60 MMHG | BODY MASS INDEX: 28.33 KG/M2

## 2024-01-30 DIAGNOSIS — Z01.419 WOMEN'S ANNUAL ROUTINE GYNECOLOGICAL EXAMINATION: Primary | ICD-10-CM

## 2024-01-30 DIAGNOSIS — Z11.3 SCREENING FOR STD (SEXUALLY TRANSMITTED DISEASE): ICD-10-CM

## 2024-01-30 PROCEDURE — 87591 N.GONORRHOEAE DNA AMP PROB: CPT | Performed by: PHYSICIAN ASSISTANT

## 2024-01-30 PROCEDURE — 87491 CHLMYD TRACH DNA AMP PROBE: CPT | Performed by: PHYSICIAN ASSISTANT

## 2024-01-30 PROCEDURE — S0612 ANNUAL GYNECOLOGICAL EXAMINA: HCPCS | Performed by: PHYSICIAN ASSISTANT

## 2024-01-30 NOTE — PROGRESS NOTES
ASSESSMENT & PLAN:   Diagnoses and all orders for this visit:    Women's annual routine gynecological examination    Screening for STD (sexually transmitted disease)  -     HIV 1/2 AG/AB w Reflex [facility] for 2 yr old and above; Future  -     Hepatitis C antibody; Future  -     Hepatitis B surface antigen; Future  -     RPR-Syphilis Screening (Total Syphilis IGG/IGM); Future  -     Chlamydia/GC amplified DNA by PCR          The following were reviewed in today's visit: ASCCP guidelines, Gardisil vaccination, STD testing breast self exam, STD testing, exercise, and healthy diet.    Patient to return to office in yearly for annual exam.     All questions have been answered to her satisfaction.        CC:  Annual Gynecologic Examination  Chief Complaint   Patient presents with    Gynecologic Exam     Annual exm, pap not indicated. Pt is well, states there are no gyn concerns.   Requested screening for STD   Last  pap 23 neg       HPI: Madeleine Mccoy is a 23 y.o.  who presents for annual gynecologic examination.  She has the following concerns:  Patient reports she has had some breast tenderness around her period.  A breast ultrasound was ordered in November by our office but patient did not complete it. She reports her pain has resolved. She would like GC testing today.       Health Maintenance:    Exercise: frequently  Breast exams/breast awareness: yes    Past Medical History:   Diagnosis Date    Endometriosis        Past Surgical History:   Procedure Laterality Date    LEG TENDON SURGERY      WISDOM TOOTH EXTRACTION         Past OB/Gyn History:  Period Cycle (Days): 28  Period Duration (Days): 4  Period Pattern: Regular  Menstrual Flow: Light  Menstrual Control: Other (Comment), TamponPatient's last menstrual period was 2024 (approximate).    Last Pap:  : no abnormalities  History of abnormal Pap smear: yes  , ASCUS, +other HRHPV  , NILM    HPV vaccine completed: yes    Patient is  currently sexually active.   STD testing: yes  Current contraception: OCP (estrogen/progesterone)      Family History  Family History   Problem Relation Age of Onset    No Known Problems Mother     No Known Problems Father     No Known Problems Brother     No Known Problems Maternal Grandmother     No Known Problems Maternal Grandfather     Diabetes Paternal Grandmother     Lung disease Paternal Grandmother        Family history of uterine or ovarian cancer: no  Family history of breast cancer: no  Family history of colon cancer: no    Social History:  Social History     Socioeconomic History    Marital status: Single     Spouse name: Not on file    Number of children: Not on file    Years of education: Not on file    Highest education level: Not on file   Occupational History    Not on file   Tobacco Use    Smoking status: Never    Smokeless tobacco: Never   Vaping Use    Vaping status: Never Used   Substance and Sexual Activity    Alcohol use: Yes     Comment: social     Drug use: Not Currently     Types: Marijuana    Sexual activity: Yes     Partners: Male     Birth control/protection: None, OCP   Other Topics Concern    Not on file   Social History Narrative    Not on file     Social Determinants of Health     Financial Resource Strain: Not on file   Food Insecurity: Not on file   Transportation Needs: Not on file   Physical Activity: Not on file   Stress: Not on file   Social Connections: Not on file   Intimate Partner Violence: Not on file   Housing Stability: Not on file     Domestic violence screen: negative    Allergies:  Allergies   Allergen Reactions    Apple - Food Allergy     Other Other (See Comments)     Per patient, itchy eyes, stuffy nose & sneezing.--Cats    Prunus Persica        Medications:    Current Outpatient Medications:     norgestrel-ethinyl estradiol (Low-Ogestrel) 0.3 mg-30 mcg per tablet, Take 1 tablet by mouth daily, Disp: 84 tablet, Rfl: 1    Review of Systems:  Review of Systems    Constitutional:  Negative for chills, fever and unexpected weight change.   Respiratory:  Negative for shortness of breath.    Cardiovascular:  Negative for chest pain.   Gastrointestinal:  Negative for abdominal pain, diarrhea, nausea and vomiting.   Skin:  Negative for rash.   Psychiatric/Behavioral:  Negative for dysphoric mood. The patient is not nervous/anxious.          Physical Exam:  /60 (BP Location: Left arm, Patient Position: Sitting, Cuff Size: Standard)   Wt 79.4 kg (175 lb)   LMP 01/01/2024 (Approximate)   BMI 28.33 kg/m²    Physical Exam  Constitutional:       Appearance: Normal appearance.   Genitourinary:      Vulva and urethral meatus normal.      No lesions in the vagina.      Right Labia: No rash or lesions.     Left Labia: No lesions or rash.     No vaginal discharge, erythema or bleeding.        Right Adnexa: not tender and no mass present.     Left Adnexa: not tender and no mass present.     No cervical discharge or lesion.      Uterus is not tender.   Breasts:     Breasts are symmetrical.      Right: No mass or skin change.      Left: No mass or skin change.   HENT:      Head: Normocephalic and atraumatic.   Cardiovascular:      Rate and Rhythm: Normal rate and regular rhythm.      Heart sounds: Normal heart sounds. No murmur heard.     No friction rub. No gallop.   Pulmonary:      Effort: Pulmonary effort is normal.      Breath sounds: Normal breath sounds. No wheezing, rhonchi or rales.   Abdominal:      General: Abdomen is flat. There is no distension.      Palpations: Abdomen is soft.      Tenderness: There is no abdominal tenderness.   Musculoskeletal:      Cervical back: Neck supple.   Lymphadenopathy:      Upper Body:      Right upper body: No axillary adenopathy.      Left upper body: No axillary adenopathy.   Neurological:      General: No focal deficit present.      Mental Status: She is alert.   Skin:     General: Skin is warm and dry.   Psychiatric:         Mood and  Affect: Mood normal.         Behavior: Behavior normal.   Vitals reviewed.

## 2024-01-31 LAB
C TRACH DNA SPEC QL NAA+PROBE: NEGATIVE
N GONORRHOEA DNA SPEC QL NAA+PROBE: NEGATIVE

## 2024-02-14 ENCOUNTER — PROCEDURE VISIT (OUTPATIENT)
Age: 24
End: 2024-02-14
Payer: COMMERCIAL

## 2024-02-14 VITALS
HEART RATE: 70 BPM | SYSTOLIC BLOOD PRESSURE: 106 MMHG | DIASTOLIC BLOOD PRESSURE: 73 MMHG | BODY MASS INDEX: 28.12 KG/M2 | WEIGHT: 175 LBS | HEIGHT: 66 IN

## 2024-02-14 DIAGNOSIS — M99.04 SEGMENTAL DYSFUNCTION OF SACRAL REGION: ICD-10-CM

## 2024-02-14 DIAGNOSIS — M99.03 SEGMENTAL DYSFUNCTION OF LUMBAR REGION: ICD-10-CM

## 2024-02-14 DIAGNOSIS — M99.01 SEGMENTAL DYSFUNCTION OF CERVICAL REGION: ICD-10-CM

## 2024-02-14 DIAGNOSIS — M79.18 DIFFUSE MYOFASCIAL PAIN SYNDROME: ICD-10-CM

## 2024-02-14 DIAGNOSIS — S29.019A THORACIC MYOFASCIAL STRAIN, INITIAL ENCOUNTER: Primary | ICD-10-CM

## 2024-02-14 DIAGNOSIS — M99.02 SEGMENTAL DYSFUNCTION OF THORACIC REGION: ICD-10-CM

## 2024-02-14 PROCEDURE — 98941 CHIROPRACT MANJ 3-4 REGIONS: CPT | Performed by: CHIROPRACTOR

## 2024-02-14 PROCEDURE — 97110 THERAPEUTIC EXERCISES: CPT | Performed by: CHIROPRACTOR
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
## 2024-02-29 ENCOUNTER — OFFICE VISIT (OUTPATIENT)
Dept: FAMILY MEDICINE CLINIC | Facility: CLINIC | Age: 24
End: 2024-02-29
Payer: COMMERCIAL

## 2024-02-29 VITALS
HEIGHT: 66 IN | DIASTOLIC BLOOD PRESSURE: 70 MMHG | TEMPERATURE: 97.4 F | SYSTOLIC BLOOD PRESSURE: 114 MMHG | BODY MASS INDEX: 27.32 KG/M2 | OXYGEN SATURATION: 98 % | HEART RATE: 85 BPM | WEIGHT: 170 LBS

## 2024-02-29 DIAGNOSIS — J02.9 SORE THROAT: Primary | ICD-10-CM

## 2024-02-29 PROBLEM — G89.29 CHRONIC RIGHT-SIDED THORACIC BACK PAIN: Status: RESOLVED | Noted: 2023-10-06 | Resolved: 2024-02-29

## 2024-02-29 PROBLEM — F07.81 POSTCONCUSSION SYNDROME: Status: RESOLVED | Noted: 2017-01-11 | Resolved: 2024-02-29

## 2024-02-29 PROBLEM — M54.6 CHRONIC RIGHT-SIDED THORACIC BACK PAIN: Status: RESOLVED | Noted: 2023-10-06 | Resolved: 2024-02-29

## 2024-02-29 PROBLEM — M79.10 MYALGIA: Status: RESOLVED | Noted: 2021-10-18 | Resolved: 2024-02-29

## 2024-02-29 LAB — S PYO DNA THROAT QL NAA+PROBE: NOT DETECTED

## 2024-02-29 PROCEDURE — 87070 CULTURE OTHR SPECIMN AEROBIC: CPT | Performed by: NURSE PRACTITIONER

## 2024-02-29 PROCEDURE — 87147 CULTURE TYPE IMMUNOLOGIC: CPT | Performed by: NURSE PRACTITIONER

## 2024-02-29 PROCEDURE — 99213 OFFICE O/P EST LOW 20 MIN: CPT | Performed by: NURSE PRACTITIONER

## 2024-02-29 PROCEDURE — 87651 STREP A DNA AMP PROBE: CPT | Performed by: NURSE PRACTITIONER

## 2024-02-29 RX ORDER — AZITHROMYCIN 250 MG/1
TABLET, FILM COATED ORAL
Qty: 6 TABLET | Refills: 0 | Status: SHIPPED | OUTPATIENT
Start: 2024-02-29 | End: 2024-03-04

## 2024-02-29 RX ORDER — METHYLPREDNISOLONE 4 MG/1
TABLET ORAL
Qty: 21 EACH | Refills: 0 | Status: SHIPPED | OUTPATIENT
Start: 2024-02-29

## 2024-02-29 NOTE — PROGRESS NOTES
FAMILY PRACTICE OFFICE VISIT       NAME: Madeleine Mccoy  AGE: 23 y.o. SEX: female       : 2000        MRN: [id number]    DATE: 2024  TIME: 8:44 AM    Assessment and Plan   1. Sore throat  -     POCT rapid PCR strepA  -     azithromycin (ZITHROMAX) 250 mg tablet; Take 2 tablets today then 1 tablet daily x 4 days  -     methylPREDNISolone 4 MG tablet therapy pack; Use as directed on package  -     Throat culture; Future                 Chief Complaint     Chief Complaint   Patient presents with    post nasal drip     Pt c/o of post nasal drip, cough,head congestion,fatigue       History of Present Illness   Madeleine Mccoy is a 23 y.o.-year-old female who is here for illness  Started to get sick with pnd after thanksgiving  Given meds antibiotic and steroid  Resolved  Now back  Lingering for months  Last week started with cough and green mucous  Not resolving  Fatigue  Green mucous resolved  Still with mucous in chest   Trouble getting mucous to expectorate  Has sore throat  Having constipation since went on vacation to [place]     Review of Systems   Review of Systems   Constitutional:  Positive for fatigue. Negative for chills and fever.   HENT:  Positive for congestion, ear pain, postnasal drip, rhinorrhea and sore throat.    Respiratory:  Positive for cough. Negative for chest tightness, shortness of breath and wheezing.    Cardiovascular:  Negative for chest pain and palpitations.   Gastrointestinal:  Positive for constipation. Negative for diarrhea, nausea and vomiting.   Genitourinary:  Negative for dysuria and frequency.   Musculoskeletal:  Negative for arthralgias and myalgias.   Skin:  Negative for rash.   Neurological:  Negative for dizziness, light-headedness and headaches.   Hematological:  Negative for adenopathy.   Psychiatric/Behavioral:  Negative for dysphoric mood and sleep disturbance. The patient is not nervous/anxious.        Active Problem List     Patient Active  Problem List   Diagnosis    ASCUS with positive high risk HPV cervical    Chronic pain due to trauma    Overweight (BMI 25.0-29.9)    Food intolerance         Past Medical History:  Past Medical History:   Diagnosis Date    Endometriosis        Past Surgical History:  Past Surgical History:   Procedure Laterality Date    LEG TENDON SURGERY      WISDOM TOOTH EXTRACTION         Family History:  Family History   Problem Relation Age of Onset    No Known Problems Mother     No Known Problems Father     No Known Problems Brother     No Known Problems Maternal Grandmother     No Known Problems Maternal Grandfather     Diabetes Paternal Grandmother     Lung disease Paternal Grandmother        Social History:  Social History     Socioeconomic History    Marital status: Single     Spouse name: Not on file    Number of children: Not on file    Years of education: Not on file    Highest education level: Not on file   Occupational History    Not on file   Tobacco Use    Smoking status: Never    Smokeless tobacco: Never   Vaping Use    Vaping status: Never Used   Substance and Sexual Activity    Alcohol use: Yes     Comment: social     Drug use: Not Currently     Types: Marijuana    Sexual activity: Yes     Partners: Male     Birth control/protection: None, OCP   Other Topics Concern    Not on file   Social History Narrative    Not on file     Social Determinants of Health     Financial Resource Strain: Not on file   Food Insecurity: Not on file   Transportation Needs: Not on file   Physical Activity: Not on file   Stress: Not on file   Social Connections: Not on file   Intimate Partner Violence: Not on file   Housing Stability: Not on file       Objective     Vitals:    02/29/24 0818   BP: 114/70   Pulse: 85   Temp: (!) 97.4 °F (36.3 °C)   SpO2: 98%     Wt Readings from Last 3 Encounters:   02/29/24 77.1 kg (170 lb)   02/14/24 79.4 kg (175 lb)   01/30/24 79.4 kg (175 lb)       Physical Exam  Vitals and nursing note reviewed.  "  Constitutional:       Appearance: Normal appearance.   HENT:      Head: Normocephalic and atraumatic.      Right Ear: Tympanic membrane, ear canal and external ear normal.      Left Ear: Tympanic membrane, ear canal and external ear normal.      Nose: Congestion and rhinorrhea present.      Mouth/Throat:      Pharynx: Posterior oropharyngeal erythema present.   Eyes:      Conjunctiva/sclera: Conjunctivae normal.   Cardiovascular:      Rate and Rhythm: Normal rate and regular rhythm.      Heart sounds: Normal heart sounds.   Pulmonary:      Effort: Pulmonary effort is normal.      Breath sounds: Normal breath sounds.   Abdominal:      General: Bowel sounds are normal.   Musculoskeletal:         General: Normal range of motion.      Cervical back: Normal range of motion and neck supple.   Skin:     General: Skin is warm and dry.      Capillary Refill: Capillary refill takes less than 2 seconds.   Neurological:      Mental Status: She is alert and oriented to person, place, and time.   Psychiatric:         Mood and Affect: Mood normal.         Behavior: Behavior normal.         Pertinent Laboratory/Diagnostic Studies:  Lab Results   Component Value Date    BUN 13 10/10/2023    CREATININE 0.75 10/10/2023    CALCIUM 9.7 10/10/2023    K 4.3 10/10/2023    CO2 26 10/10/2023     10/10/2023     Lab Results   Component Value Date    ALT 11 10/10/2023    AST 21 10/10/2023    ALKPHOS 56 10/10/2023       Lab Results   Component Value Date    WBC 6.49 10/10/2023    HGB 14.9 10/10/2023    HCT 44.4 10/10/2023    MCV 96 10/10/2023     10/10/2023       Lab Results   Component Value Date    TSH 3.62 02/15/2018       No results found for: \"CHOL\"  No results found for: \"TRIG\"  No results found for: \"HDL\"  No results found for: \"LDLCALC\"  Lab Results   Component Value Date    HGBA1C 5.0 10/10/2023       Results for orders placed or performed in visit on 02/29/24   POCT rapid PCR strepA   Result Value Ref Range    RAPID PCR " STREP A Not Detected Not Detected       Orders Placed This Encounter   Procedures    Throat culture    POCT rapid PCR strepA       ALLERGIES:  Allergies   Allergen Reactions    Apple - Food Allergy     Other Other (See Comments)     Per patient, itchy eyes, stuffy nose & sneezing.--Cats    Prunus Persica        Current Medications     Current Outpatient Medications   Medication Sig Dispense Refill    azithromycin (ZITHROMAX) 250 mg tablet Take 2 tablets today then 1 tablet daily x 4 days 6 tablet 0    methylPREDNISolone 4 MG tablet therapy pack Use as directed on package 21 each 0    norgestrel-ethinyl estradiol (Low-Ogestrel) 0.3 mg-30 mcg per tablet Take 1 tablet by mouth daily 84 tablet 1     No current facility-administered medications for this visit.         Health Maintenance     Health Maintenance   Topic Date Due    COVID-19 Vaccine (4 - 2023-24 season) 05/29/2024 (Originally 9/1/2023)    Influenza Vaccine (1) 06/30/2024 (Originally 9/1/2023)    Annual Physical  01/30/2025    Chlamydia Screening  01/30/2025    Depression Screening  02/28/2025    Cervical Cancer Screening  01/09/2026    DTaP,Tdap,and Td Vaccines (8 - Td or Tdap) 06/20/2032    Zoster Vaccine (1 of 2) 03/25/2051    HIV Screening  Completed    Hepatitis C Screening  Completed    HIB Vaccine  Completed    IPV Vaccine  Completed    Hepatitis A Vaccine  Completed    Meningococcal ACWY Vaccine  Completed    HPV Vaccine  Completed    Pneumococcal Vaccine: Pediatrics (0 to 5 Years) and At-Risk Patients (6 to 64 Years)  Aged Out     Immunization History   Administered Date(s) Administered    COVID-19 PFIZER VACCINE 0.3 ML IM 02/19/2021, 04/19/2021, 05/10/2021    DTaP / HiB 2001    DTaP / HiB / IPV 2001, 2001    DTaP 5 06/27/2002, 05/09/2006    HPV9 06/08/2021, 06/08/2021, 07/27/2021, 07/27/2021, 12/20/2021    Hep A, adult 05/10/2007, 05/12/2008    Hep B, adult 2001, 01/02/2002, 06/27/2002    Hib (PRP-OMP) 03/27/2002    INFLUENZA  2001, 11/20/2010, 11/03/2014, 02/19/2016, 01/30/2018    IPV 06/27/2002, 05/09/2006    Influenza, seasonal, injectable 11/23/2013    MMR 03/27/2002, 04/25/2005    Meningococcal MCV4P 10/18/2017    Meningococcal, Unknown Serogroups 05/14/2012    Pneumococcal Conjugate PCV 7 2001, 2001, 2001, 03/27/2002    Tdap 05/14/2012, 06/20/2022    Tuberculin Skin Test-PPD Intradermal 06/20/2022    Varicella 03/27/2002, 05/10/2007          FELICIA Johnson

## 2024-03-02 LAB — BACTERIA THROAT CULT: ABNORMAL

## 2024-03-25 ENCOUNTER — OFFICE VISIT (OUTPATIENT)
Dept: FAMILY MEDICINE CLINIC | Facility: CLINIC | Age: 24
End: 2024-03-25
Payer: COMMERCIAL

## 2024-03-25 VITALS
DIASTOLIC BLOOD PRESSURE: 62 MMHG | OXYGEN SATURATION: 99 % | TEMPERATURE: 97.2 F | RESPIRATION RATE: 16 BRPM | WEIGHT: 167.8 LBS | SYSTOLIC BLOOD PRESSURE: 104 MMHG | BODY MASS INDEX: 27.17 KG/M2 | HEART RATE: 91 BPM

## 2024-03-25 DIAGNOSIS — J03.90 TONSILLITIS: Primary | ICD-10-CM

## 2024-03-25 PROCEDURE — 87147 CULTURE TYPE IMMUNOLOGIC: CPT | Performed by: NURSE PRACTITIONER

## 2024-03-25 PROCEDURE — 87070 CULTURE OTHR SPECIMN AEROBIC: CPT | Performed by: NURSE PRACTITIONER

## 2024-03-25 PROCEDURE — 99213 OFFICE O/P EST LOW 20 MIN: CPT | Performed by: NURSE PRACTITIONER

## 2024-03-25 NOTE — PROGRESS NOTES
Name: Madeleine Mccoy      : 2000      MRN: [id number]  Encounter Provider: FELICIA Christie  Encounter Date: 3/25/2024   Encounter department: [facility]    Assessment & Plan     1. Tonsillitis  Assessment & Plan:  Tonsils enlarged with white patches.  Mild cough, congestion.  Send out throat culture and follow up pending result.  Recommend tea with honey, salt water gargles, ibuprofen.      Orders:  -     Throat culture           Subjective      Pt is a 24 y.o. y/o female who is seen today for evaluation of swollen tonsils.  She says she was out all weekend and barely slept and she says that when she does not sleep well her tonsils usually swell up.  Her throat is sore, mild congestion, slight cough, slight body aches yesterday.  Denies fever, chills, headache.  Appetite is normal, no n/v but has loose stool.      Review of Systems   Constitutional:  Positive for fatigue. Negative for appetite change, chills and fever.   HENT:  Positive for congestion, sore throat and trouble swallowing. Negative for ear pain, postnasal drip and sinus pressure.    Respiratory:  Positive for cough. Negative for chest tightness, shortness of breath and wheezing.    Cardiovascular:  Negative for chest pain, palpitations and leg swelling.   Gastrointestinal:  Negative for diarrhea, nausea and vomiting.   Genitourinary:  Negative for dysuria.   Musculoskeletal:  Negative for myalgias.   Neurological:  Negative for dizziness and headaches.   Hematological:  Negative for adenopathy.   Psychiatric/Behavioral:  Negative for sleep disturbance.        Current Outpatient Medications on File Prior to Visit   Medication Sig    norgestrel-ethinyl estradiol (Low-Ogestrel) 0.3 mg-30 mcg per tablet Take 1 tablet by mouth daily    methylPREDNISolone 4 MG tablet therapy pack Use as directed on package (Patient not taking: Reported on 3/25/2024)       Objective     /62 (BP Location: Left arm, Patient  Position: Sitting, Cuff Size: Standard)   Pulse 91   Temp (!) 97.2 °F (36.2 °C) (Tympanic)   Resp 16   Wt 76.1 kg (167 lb 12.8 oz)   SpO2 99%   BMI 27.17 kg/m²     Physical Exam  Vitals reviewed.   Constitutional:       General: She is awake. She is not in acute distress.     Appearance: Normal appearance. She is well-developed and well-groomed. She is not ill-appearing.   HENT:      Head: Normocephalic.      Right Ear: Hearing, tympanic membrane, ear canal and external ear normal. No middle ear effusion. Tympanic membrane is not bulging.      Left Ear: Hearing, tympanic membrane, ear canal and external ear normal.  No middle ear effusion. Tympanic membrane is not bulging.      Nose: Congestion present. No mucosal edema or rhinorrhea.      Mouth/Throat:      Mouth: Mucous membranes are not dry.      Pharynx: Oropharynx is clear. No oropharyngeal exudate or posterior oropharyngeal erythema.      Tonsils: Tonsillar exudate (white patches noted on tonsils) present. No tonsillar abscesses. 2+ on the right. 2+ on the left.   Eyes:      Conjunctiva/sclera: Conjunctivae normal.   Cardiovascular:      Rate and Rhythm: Normal rate and regular rhythm.      Heart sounds: Normal heart sounds. No murmur heard.  Pulmonary:      Effort: Pulmonary effort is normal. No accessory muscle usage or respiratory distress.      Breath sounds: Normal breath sounds. No decreased breath sounds, wheezing, rhonchi or rales.   Lymphadenopathy:      Head:      Right side of head: No submental, submandibular, tonsillar, preauricular, posterior auricular or occipital adenopathy.      Left side of head: No submental, submandibular, tonsillar, preauricular, posterior auricular or occipital adenopathy.      Cervical: No cervical adenopathy.   Skin:     General: Skin is warm and dry.   Neurological:      Mental Status: She is alert and oriented to person, place, and time.   Psychiatric:         Attention and Perception: Attention normal.          Mood and Affect: Mood normal.         Speech: Speech normal.         Behavior: Behavior normal. Behavior is cooperative.         Thought Content: Thought content normal.         Cognition and Memory: Cognition normal.         Judgment: Judgment normal.       FELICIA Christie

## 2024-03-25 NOTE — ASSESSMENT & PLAN NOTE
Tonsils enlarged with white patches.  Mild cough, congestion.  Send out throat culture and follow up pending result.  Recommend tea with honey, salt water gargles, ibuprofen.

## 2024-03-26 ENCOUNTER — OFFICE VISIT (OUTPATIENT)
Dept: FAMILY MEDICINE CLINIC | Facility: CLINIC | Age: 24
End: 2024-03-26
Payer: COMMERCIAL

## 2024-03-26 VITALS
TEMPERATURE: 97.2 F | HEART RATE: 97 BPM | SYSTOLIC BLOOD PRESSURE: 108 MMHG | HEIGHT: 66 IN | DIASTOLIC BLOOD PRESSURE: 60 MMHG | RESPIRATION RATE: 17 BRPM | OXYGEN SATURATION: 96 % | WEIGHT: 166 LBS | BODY MASS INDEX: 26.68 KG/M2

## 2024-03-26 DIAGNOSIS — H10.31 ACUTE BACTERIAL CONJUNCTIVITIS OF RIGHT EYE: Primary | ICD-10-CM

## 2024-03-26 PROCEDURE — 99213 OFFICE O/P EST LOW 20 MIN: CPT | Performed by: NURSE PRACTITIONER

## 2024-03-26 RX ORDER — TOBRAMYCIN 3 MG/ML
1 SOLUTION/ DROPS OPHTHALMIC
Qty: 5 ML | Refills: 0 | Status: SHIPPED | OUTPATIENT
Start: 2024-03-26 | End: 2024-03-31

## 2024-03-26 NOTE — PROGRESS NOTES
FAMILY PRACTICE OFFICE VISIT       NAME: Madeleine Mccoy  AGE: 24 y.o. SEX: female       : 2000        MRN: [id number]    DATE: 3/26/2024  TIME: 1:09 PM    Assessment and Plan   1. Acute bacterial conjunctivitis of right eye  -     tobramycin (TOBREX) 0.3 % SOLN; Administer 1 drop to the right eye every 4 (four) hours while awake for 5 days         There are no Patient Instructions on file for this visit.        Chief Complaint     Chief Complaint   Patient presents with    Conjunctivitis     Pt been seeing for pink eye       History of Present Illness   Madeleine Mccoy is a 24 y.o.-year-old female who is here for c/o redness and drainage from right eye  No blurry vision  Exposed to pink eye at work at with friend  Yellow exudate this am   No pain  Used no meds      Review of Systems   Review of Systems   Constitutional:  Negative for fatigue and fever.   HENT:  Negative for congestion, postnasal drip and rhinorrhea.    Eyes:  Positive for discharge and redness. Negative for photophobia, pain, itching and visual disturbance.   Respiratory:  Negative for cough and shortness of breath.    Cardiovascular:  Negative for chest pain and palpitations.   Musculoskeletal:  Negative for arthralgias and myalgias.   Skin:  Negative for rash.   Neurological:  Negative for dizziness, light-headedness and headaches.   Hematological:  Negative for adenopathy.   Psychiatric/Behavioral:  Negative for dysphoric mood and sleep disturbance. The patient is not nervous/anxious.        Active Problem List     Patient Active Problem List   Diagnosis    ASCUS with positive high risk HPV cervical    Chronic pain due to trauma    Overweight (BMI 25.0-29.9)    Food intolerance    Tonsillitis         Past Medical History:  Past Medical History:   Diagnosis Date    Endometriosis        Past Surgical History:  Past Surgical History:   Procedure Laterality Date    LEG TENDON SURGERY      WISDOM TOOTH EXTRACTION          Family History:  Family History   Problem Relation Age of Onset    No Known Problems Mother     No Known Problems Father     No Known Problems Brother     No Known Problems Maternal Grandmother     No Known Problems Maternal Grandfather     Diabetes Paternal Grandmother     Lung disease Paternal Grandmother        Social History:  Social History     Socioeconomic History    Marital status: Single     Spouse name: Not on file    Number of children: Not on file    Years of education: Not on file    Highest education level: Not on file   Occupational History    Not on file   Tobacco Use    Smoking status: Never    Smokeless tobacco: Never   Vaping Use    Vaping status: Never Used   Substance and Sexual Activity    Alcohol use: Yes     Comment: social     Drug use: Not Currently     Types: Marijuana    Sexual activity: Yes     Partners: Male     Birth control/protection: None, OCP   Other Topics Concern    Not on file   Social History Narrative    Not on file     Social Determinants of Health     Financial Resource Strain: Not on file   Food Insecurity: Not on file   Transportation Needs: Not on file   Physical Activity: Not on file   Stress: Not on file   Social Connections: Not on file   Intimate Partner Violence: Not on file   Housing Stability: Not on file       Objective     Vitals:    03/26/24 1132   BP: 108/60   Pulse: 97   Resp: 17   Temp: (!) 97.2 °F (36.2 °C)   SpO2: 96%     Wt Readings from Last 3 Encounters:   03/26/24 75.3 kg (166 lb)   03/25/24 76.1 kg (167 lb 12.8 oz)   02/29/24 77.1 kg (170 lb)       Physical Exam  Vitals and nursing note reviewed.   Constitutional:       Appearance: Normal appearance.   HENT:      Head: Normocephalic and atraumatic.      Nose: Nose normal.      Mouth/Throat:      Mouth: Mucous membranes are moist.   Eyes:      General:         Right eye: Discharge present.      Conjunctiva/sclera:      Right eye: Right conjunctiva is injected.   Cardiovascular:      Rate and  "Rhythm: Normal rate and regular rhythm.      Heart sounds: Normal heart sounds.   Pulmonary:      Effort: Pulmonary effort is normal.      Breath sounds: Normal breath sounds.   Musculoskeletal:         General: Normal range of motion.      Cervical back: Normal range of motion.   Skin:     General: Skin is warm and dry.      Capillary Refill: Capillary refill takes less than 2 seconds.   Neurological:      Mental Status: She is alert and oriented to person, place, and time.   Psychiatric:         Mood and Affect: Mood normal.         Behavior: Behavior normal.         Pertinent Laboratory/Diagnostic Studies:  Lab Results   Component Value Date    BUN 13 10/10/2023    CREATININE 0.75 10/10/2023    CALCIUM 9.7 10/10/2023    K 4.3 10/10/2023    CO2 26 10/10/2023     10/10/2023     Lab Results   Component Value Date    ALT 11 10/10/2023    AST 21 10/10/2023    ALKPHOS 56 10/10/2023       Lab Results   Component Value Date    WBC 6.49 10/10/2023    HGB 14.9 10/10/2023    HCT 44.4 10/10/2023    MCV 96 10/10/2023     10/10/2023       Lab Results   Component Value Date    TSH 3.62 02/15/2018       No results found for: \"CHOL\"  No results found for: \"TRIG\"  No results found for: \"HDL\"  No results found for: \"LDLCALC\"  Lab Results   Component Value Date    HGBA1C 5.0 10/10/2023       Results for orders placed or performed in visit on 03/25/24   Throat culture    Specimen: Throat   Result Value Ref Range    Throat Culture Culture results to follow.        No orders of the defined types were placed in this encounter.      ALLERGIES:  Allergies   Allergen Reactions    Apple - Food Allergy     Other Other (See Comments)     Per patient, itchy eyes, stuffy nose & sneezing.--Cats    Prunus Persica        Current Medications     Current Outpatient Medications   Medication Sig Dispense Refill    norgestrel-ethinyl estradiol (Low-Ogestrel) 0.3 mg-30 mcg per tablet Take 1 tablet by mouth daily 84 tablet 1    tobramycin " (TOBREX) 0.3 % SOLN Administer 1 drop to the right eye every 4 (four) hours while awake for 5 days 5 mL 0    methylPREDNISolone 4 MG tablet therapy pack Use as directed on package (Patient not taking: Reported on 3/25/2024) 21 each 0     No current facility-administered medications for this visit.         Health Maintenance     Health Maintenance   Topic Date Due    COVID-19 Vaccine (4 - 2023-24 season) 05/29/2024 (Originally 9/1/2023)    Influenza Vaccine (1) 06/30/2024 (Originally 9/1/2023)    Annual Physical  01/30/2025    Chlamydia Screening  01/30/2025    Depression Screening  02/28/2025    Cervical Cancer Screening  01/09/2026    DTaP,Tdap,and Td Vaccines (8 - Td or Tdap) 06/20/2032    Zoster Vaccine (1 of 2) 03/25/2051    HIV Screening  Completed    Hepatitis C Screening  Completed    HIB Vaccine  Completed    IPV Vaccine  Completed    Hepatitis A Vaccine  Completed    Meningococcal ACWY Vaccine  Completed    HPV Vaccine  Completed    Pneumococcal Vaccine: Pediatrics (0 to 5 Years) and At-Risk Patients (6 to 64 Years)  Aged Out     Immunization History   Administered Date(s) Administered    COVID-19 PFIZER VACCINE 0.3 ML IM 02/19/2021, 04/19/2021, 05/10/2021    DTaP / HiB 2001    DTaP / HiB / IPV 2001, 2001    DTaP 5 06/27/2002, 05/09/2006    HPV9 06/08/2021, 06/08/2021, 07/27/2021, 07/27/2021, 12/20/2021    Hep A, adult 05/10/2007, 05/12/2008    Hep B, adult 2001, 01/02/2002, 06/27/2002    Hib (PRP-OMP) 03/27/2002    INFLUENZA 2001, 11/20/2010, 11/03/2014, 02/19/2016, 01/30/2018    IPV 06/27/2002, 05/09/2006    Influenza, seasonal, injectable 11/23/2013    MMR 03/27/2002, 04/25/2005    Meningococcal MCV4P 10/18/2017    Meningococcal, Unknown Serogroups 05/14/2012    Pneumococcal Conjugate PCV 7 2001, 2001, 2001, 03/27/2002    Tdap 05/14/2012, 06/20/2022    Tuberculin Skin Test-PPD Intradermal 06/20/2022    Varicella 03/27/2002, 05/10/2007          Vianca  FELICIA Almonte

## 2024-03-27 ENCOUNTER — TELEPHONE (OUTPATIENT)
Dept: FAMILY MEDICINE CLINIC | Facility: CLINIC | Age: 24
End: 2024-03-27

## 2024-03-27 DIAGNOSIS — J02.0 STREP THROAT: Primary | ICD-10-CM

## 2024-03-27 LAB — BACTERIA THROAT CULT: ABNORMAL

## 2024-03-27 RX ORDER — PENICILLIN V POTASSIUM 500 MG/1
500 TABLET ORAL EVERY 8 HOURS SCHEDULED
Qty: 30 TABLET | Refills: 0 | Status: SHIPPED | OUTPATIENT
Start: 2024-03-27 | End: 2024-04-06

## 2024-03-27 NOTE — TELEPHONE ENCOUNTER
----- Message from FELICIA Christie sent at 3/27/2024  1:04 PM EDT -----  Please let pt know she tested positive for strep throat and I am sending in antibiotics for her.  It will be penicillin and she should take the full course even though she will feel better prior to that.  Let me know if you have any questions.  Thanks.

## 2024-03-27 NOTE — TELEPHONE ENCOUNTER
FYI: Pt wanted results of throat test. Pt was given results as per note from Praveena ABDULLAHI. Pt had additional questions. Pt was transferred to Cecilia who kindly assisted pt with her needs.

## 2024-04-01 ENCOUNTER — OFFICE VISIT (OUTPATIENT)
Dept: FAMILY MEDICINE CLINIC | Facility: CLINIC | Age: 24
End: 2024-04-01
Payer: COMMERCIAL

## 2024-04-01 VITALS
HEIGHT: 66 IN | RESPIRATION RATE: 17 BRPM | BODY MASS INDEX: 26.68 KG/M2 | SYSTOLIC BLOOD PRESSURE: 100 MMHG | DIASTOLIC BLOOD PRESSURE: 60 MMHG | TEMPERATURE: 96.8 F | WEIGHT: 166 LBS | HEART RATE: 83 BPM | OXYGEN SATURATION: 97 %

## 2024-04-01 DIAGNOSIS — J06.9 ACUTE UPPER RESPIRATORY INFECTION: Primary | ICD-10-CM

## 2024-04-01 PROCEDURE — 99213 OFFICE O/P EST LOW 20 MIN: CPT | Performed by: NURSE PRACTITIONER

## 2024-04-01 RX ORDER — AZITHROMYCIN 250 MG/1
TABLET, FILM COATED ORAL
Qty: 6 TABLET | Refills: 0 | Status: SHIPPED | OUTPATIENT
Start: 2024-04-01 | End: 2024-04-05

## 2024-04-01 NOTE — PROGRESS NOTES
FAMILY PRACTICE OFFICE VISIT       NAME: Madeleine Mccoy  AGE: 24 y.o. SEX: female       : 2000        MRN: [id number]    DATE: 2024  TIME: 1:36 PM    Assessment and Plan   1. Acute upper respiratory infection  -     azithromycin (ZITHROMAX) 250 mg tablet; Take 2 tablets today then 1 tablet daily x 4 days         There are no Patient Instructions on file for this visit.        Chief Complaint     Chief Complaint   Patient presents with    Cough     Pt being seen for cough and earache       History of Present Illness   Madeleine Mccoy is a 24 y.o.-year-old female who is here for illness  Last week came in for strep  Positive for strep  Given pcn  Throat better  Now with dry cough  Thick mucous   Past three days rattling in chest  Lymph nodes swollen      Review of Systems   Review of Systems   Constitutional:  Positive for fatigue. Negative for chills and fever.   HENT:  Positive for congestion, postnasal drip and rhinorrhea. Negative for ear pain, sinus pressure, sinus pain and sore throat.    Respiratory:  Positive for cough, chest tightness, shortness of breath and wheezing.    Cardiovascular:  Negative for chest pain and palpitations.   Gastrointestinal:  Negative for constipation, diarrhea, nausea and vomiting.   Musculoskeletal:  Negative for arthralgias and myalgias.   Skin:  Negative for rash.   Neurological:  Negative for dizziness, light-headedness and headaches.   Hematological:  Positive for adenopathy.   Psychiatric/Behavioral:  Negative for dysphoric mood and sleep disturbance. The patient is not nervous/anxious.        Active Problem List     Patient Active Problem List   Diagnosis    ASCUS with positive high risk HPV cervical    Chronic pain due to trauma    Overweight (BMI 25.0-29.9)    Food intolerance    Tonsillitis         Past Medical History:  Past Medical History:   Diagnosis Date    Endometriosis        Past Surgical History:  Past Surgical History:   Procedure  Laterality Date    LEG TENDON SURGERY      WISDOM TOOTH EXTRACTION         Family History:  Family History   Problem Relation Age of Onset    No Known Problems Mother     No Known Problems Father     No Known Problems Brother     No Known Problems Maternal Grandmother     No Known Problems Maternal Grandfather     Diabetes Paternal Grandmother     Lung disease Paternal Grandmother        Social History:  Social History     Socioeconomic History    Marital status: Single     Spouse name: Not on file    Number of children: Not on file    Years of education: Not on file    Highest education level: Not on file   Occupational History    Not on file   Tobacco Use    Smoking status: Never    Smokeless tobacco: Never   Vaping Use    Vaping status: Never Used   Substance and Sexual Activity    Alcohol use: Yes     Comment: social     Drug use: Not Currently     Types: Marijuana    Sexual activity: Yes     Partners: Male     Birth control/protection: None, OCP   Other Topics Concern    Not on file   Social History Narrative    Not on file     Social Determinants of Health     Financial Resource Strain: Not on file   Food Insecurity: Not on file   Transportation Needs: Not on file   Physical Activity: Not on file   Stress: Not on file   Social Connections: Not on file   Intimate Partner Violence: Not on file   Housing Stability: Not on file       Objective     Vitals:    04/01/24 1206   BP: 100/60   Pulse: 83   Resp: 17   Temp: (!) 96.8 °F (36 °C)   SpO2: 97%     Wt Readings from Last 3 Encounters:   04/01/24 75.3 kg (166 lb)   03/26/24 75.3 kg (166 lb)   03/25/24 76.1 kg (167 lb 12.8 oz)       Physical Exam  Vitals and nursing note reviewed.   Constitutional:       Appearance: Normal appearance.   HENT:      Head: Normocephalic and atraumatic.      Right Ear: Tympanic membrane, ear canal and external ear normal.      Left Ear: Tympanic membrane, ear canal and external ear normal.      Nose: Congestion and rhinorrhea present.  "     Mouth/Throat:      Pharynx: Posterior oropharyngeal erythema present.   Eyes:      Conjunctiva/sclera: Conjunctivae normal.   Cardiovascular:      Rate and Rhythm: Normal rate and regular rhythm.      Heart sounds: Normal heart sounds.   Pulmonary:      Effort: Pulmonary effort is normal.      Breath sounds: Normal breath sounds.   Musculoskeletal:         General: Normal range of motion.      Cervical back: Normal range of motion and neck supple.   Skin:     General: Skin is warm and dry.      Capillary Refill: Capillary refill takes less than 2 seconds.   Neurological:      Mental Status: She is alert and oriented to person, place, and time.   Psychiatric:         Mood and Affect: Mood normal.         Behavior: Behavior normal.         Pertinent Laboratory/Diagnostic Studies:  Lab Results   Component Value Date    BUN 13 10/10/2023    CREATININE 0.75 10/10/2023    CALCIUM 9.7 10/10/2023    K 4.3 10/10/2023    CO2 26 10/10/2023     10/10/2023     Lab Results   Component Value Date    ALT 11 10/10/2023    AST 21 10/10/2023    ALKPHOS 56 10/10/2023       Lab Results   Component Value Date    WBC 6.49 10/10/2023    HGB 14.9 10/10/2023    HCT 44.4 10/10/2023    MCV 96 10/10/2023     10/10/2023       Lab Results   Component Value Date    TSH 3.62 02/15/2018       No results found for: \"CHOL\"  No results found for: \"TRIG\"  No results found for: \"HDL\"  No results found for: \"LDLCALC\"  Lab Results   Component Value Date    HGBA1C 5.0 10/10/2023       Results for orders placed or performed in visit on 03/25/24   Throat culture    Specimen: Throat   Result Value Ref Range    Throat Culture 3+ Growth of Beta Hemolytic Streptococcus Group A (A)        No orders of the defined types were placed in this encounter.      ALLERGIES:  Allergies   Allergen Reactions    Apple - Food Allergy     Other Other (See Comments)     Per patient, itchy eyes, stuffy nose & sneezing.--Cats    Prunus Persica        Current " Medications     Current Outpatient Medications   Medication Sig Dispense Refill    azithromycin (ZITHROMAX) 250 mg tablet Take 2 tablets today then 1 tablet daily x 4 days 6 tablet 0    norgestrel-ethinyl estradiol (Low-Ogestrel) 0.3 mg-30 mcg per tablet Take 1 tablet by mouth daily 84 tablet 1    methylPREDNISolone 4 MG tablet therapy pack Use as directed on package (Patient not taking: Reported on 3/25/2024) 21 each 0     No current facility-administered medications for this visit.         Health Maintenance     Health Maintenance   Topic Date Due    COVID-19 Vaccine (4 - 2023-24 season) 05/29/2024 (Originally 9/1/2023)    Influenza Vaccine (1) 06/30/2024 (Originally 9/1/2023)    Annual Physical  01/30/2025    Chlamydia Screening  01/30/2025    Depression Screening  02/28/2025    Cervical Cancer Screening  01/09/2026    DTaP,Tdap,and Td Vaccines (8 - Td or Tdap) 06/20/2032    Zoster Vaccine (1 of 2) 03/25/2051    HIV Screening  Completed    Hepatitis C Screening  Completed    HIB Vaccine  Completed    IPV Vaccine  Completed    Hepatitis A Vaccine  Completed    Meningococcal ACWY Vaccine  Completed    HPV Vaccine  Completed    Pneumococcal Vaccine: Pediatrics (0 to 5 Years) and At-Risk Patients (6 to 64 Years)  Aged Out     Immunization History   Administered Date(s) Administered    COVID-19 PFIZER VACCINE 0.3 ML IM 02/19/2021, 04/19/2021, 05/10/2021    DTaP / HiB 2001    DTaP / HiB / IPV 2001, 2001    DTaP 5 06/27/2002, 05/09/2006    HPV9 06/08/2021, 06/08/2021, 07/27/2021, 07/27/2021, 12/20/2021    Hep A, adult 05/10/2007, 05/12/2008    Hep B, adult 2001, 01/02/2002, 06/27/2002    Hib (PRP-OMP) 03/27/2002    INFLUENZA 2001, 11/20/2010, 11/03/2014, 02/19/2016, 01/30/2018    IPV 06/27/2002, 05/09/2006    Influenza, seasonal, injectable 11/23/2013    MMR 03/27/2002, 04/25/2005    Meningococcal MCV4P 10/18/2017    Meningococcal, Unknown Serogroups 05/14/2012    Pneumococcal Conjugate PCV  7 2001, 2001, 2001, 03/27/2002    Tdap 05/14/2012, 06/20/2022    Tuberculin Skin Test-PPD Intradermal 06/20/2022    Varicella 03/27/2002, 05/10/2007          FELICIA Johnson

## 2024-04-15 ENCOUNTER — PROCEDURE VISIT (OUTPATIENT)
Age: 24
End: 2024-04-15
Payer: COMMERCIAL

## 2024-04-15 VITALS — HEIGHT: 66 IN | BODY MASS INDEX: 26.68 KG/M2 | WEIGHT: 166 LBS

## 2024-04-15 DIAGNOSIS — M99.03 SEGMENTAL DYSFUNCTION OF LUMBAR REGION: ICD-10-CM

## 2024-04-15 DIAGNOSIS — Z30.011 ENCOUNTER FOR INITIAL PRESCRIPTION OF CONTRACEPTIVE PILLS: ICD-10-CM

## 2024-04-15 DIAGNOSIS — M99.02 SEGMENTAL DYSFUNCTION OF THORACIC REGION: ICD-10-CM

## 2024-04-15 DIAGNOSIS — S29.019A THORACIC MYOFASCIAL STRAIN, INITIAL ENCOUNTER: Primary | ICD-10-CM

## 2024-04-15 DIAGNOSIS — M99.01 SEGMENTAL DYSFUNCTION OF CERVICAL REGION: ICD-10-CM

## 2024-04-15 DIAGNOSIS — M99.04 SEGMENTAL DYSFUNCTION OF SACRAL REGION: ICD-10-CM

## 2024-04-15 DIAGNOSIS — M79.18 DIFFUSE MYOFASCIAL PAIN SYNDROME: ICD-10-CM

## 2024-04-15 PROCEDURE — 98941 CHIROPRACT MANJ 3-4 REGIONS: CPT | Performed by: CHIROPRACTOR

## 2024-04-15 PROCEDURE — 97110 THERAPEUTIC EXERCISES: CPT | Performed by: CHIROPRACTOR
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
## 2024-04-16 RX ORDER — NORGESTREL AND ETHINYL ESTRADIOL 0.3-0.03MG
1 KIT ORAL DAILY
Qty: 84 TABLET | Refills: 1 | Status: SHIPPED | OUTPATIENT
Start: 2024-04-16

## 2024-05-01 PROBLEM — J03.90 TONSILLITIS: Status: RESOLVED | Noted: 2024-03-25 | Resolved: 2024-05-01

## 2024-05-06 ENCOUNTER — PROCEDURE VISIT (OUTPATIENT)
Age: 24
End: 2024-05-06
Payer: COMMERCIAL

## 2024-05-06 VITALS
DIASTOLIC BLOOD PRESSURE: 65 MMHG | HEART RATE: 67 BPM | BODY MASS INDEX: 26.68 KG/M2 | WEIGHT: 166 LBS | SYSTOLIC BLOOD PRESSURE: 114 MMHG | HEIGHT: 66 IN

## 2024-05-06 DIAGNOSIS — M79.18 DIFFUSE MYOFASCIAL PAIN SYNDROME: ICD-10-CM

## 2024-05-06 DIAGNOSIS — M99.02 SEGMENTAL DYSFUNCTION OF THORACIC REGION: ICD-10-CM

## 2024-05-06 DIAGNOSIS — M99.01 SEGMENTAL DYSFUNCTION OF CERVICAL REGION: ICD-10-CM

## 2024-05-06 DIAGNOSIS — S29.019A THORACIC MYOFASCIAL STRAIN, INITIAL ENCOUNTER: Primary | ICD-10-CM

## 2024-05-06 DIAGNOSIS — M99.04 SEGMENTAL DYSFUNCTION OF SACRAL REGION: ICD-10-CM

## 2024-05-06 DIAGNOSIS — M99.03 SEGMENTAL DYSFUNCTION OF LUMBAR REGION: ICD-10-CM

## 2024-05-06 PROCEDURE — 97110 THERAPEUTIC EXERCISES: CPT | Performed by: CHIROPRACTOR

## 2024-05-06 PROCEDURE — 98941 CHIROPRACT MANJ 3-4 REGIONS: CPT | Performed by: CHIROPRACTOR

## 2024-05-06 NOTE — PROGRESS NOTES
Diagnoses and all orders for this visit:    Thoracic myofascial strain, initial encounter    Segmental dysfunction of lumbar region    Diffuse myofascial pain syndrome    Segmental dysfunction of cervical region    Segmental dysfunction of thoracic region    Segmental dysfunction of sacral region    Pt suffered exacerbation but responded to tx with reduced pain and increased ROM    TREATMENT: [id number]  Ther-ex: IASTM - discussed post procedure soreness and/or ecchymosis for up to 36 hrs, applied to affected mm hypertonicities; wall sarah, axial retraction, upper trap stretch, lev scap stretch, SCM stretch, side laying QL stretch, single knee to chest stretch, lat rows with t-band, lat pull down with t-band, abdominal bracing; greater than 15 min spent performing above mentioned ther-ex. Thoracic mobilization/manipulation: prone P-A mob, supine A-P manip; cervical mobilization/manipulation: diversified supine graded mobilization, cervical traction; R SIJ LAT and crespo drop table maneuver; lumbar supine diversified rotary mobilization B    HPI:  Madeleine Mccoy is a 24 y.o. female   Chief Complaint   Patient presents with    Neck - Pain     Neck pain is tight and sore  Pain score 7    Back Pain     Mid to lower lumbar pain score 5        Pt presents for tx of chronic intermittent neck/back pain. Pt reports hx of 4 concussions; whiplash associuated; c/s xrays and MRI demonstrate straightening and grade 1 listhesis C3/4/5. Pt underwent PT for neck 3 times without benefit. Pt lifts weights 5 days/wk  5/6: Pt reports feeling and moving better after last tx; using neck roll helping    Neck Pain   This is a chronic problem. The current episode started more than 1 year ago. The problem occurs constantly. The problem has been waxing and waning. The pain is present in the midline and right side. The quality of the pain is described as aching and shooting. Pain scale: 0-7/10. The symptoms are aggravated by stress  Patient saw Dr Nixon and got a shot in her shoulder and it helped her pain greatly so she has decided to cancel her procedure with Dr Gupta. If she wishes to schedule the DON in the future she is welcome to call us to reschedule. Patient was thankful and expressed understanding, nothing further discussed.   and position (crunches, mvmt of neck; palliative includes massage, chiro care, laying on neck support). Worse during: worse end of day. Stiffness is present All day. Associated symptoms include leg pain.   Back Pain  This is a chronic problem. The current episode started more than 1 year ago. The problem occurs constantly. The pain is present in the lumbar spine, sacro-iliac, thoracic spine and gluteal. The quality of the pain is described as aching. The pain radiates to the right thigh and right knee. Pain scale: 0-6/10. Worse during: worse end of day. The symptoms are aggravated by bending and stress. Stiffness is present All day. Associated symptoms include leg pain.   Leg Pain     Hip Pain       Past Medical History:   Diagnosis Date    Endometriosis       The following portions of the patient's history were reviewed and updated as appropriate: allergies, past family history, past medical history, past social history, past surgical history, and problem list.  Review of Systems   Musculoskeletal:  Positive for back pain and neck pain.     Physical Exam  Neck:        Comments: Pnful and limited in ext/rrot  Musculoskeletal:      Cervical back: Pain with movement and muscular tenderness present. Decreased range of motion.      Thoracic back: Spasms and tenderness present. Decreased range of motion.      Lumbar back: Spasms and tenderness present. Decreased range of motion. Negative right straight leg raise test and negative left straight leg raise test.        Back:       Comments: Pnful and limited in Ext in R SIJ, Rlf    Lymphadenopathy:      Cervical: No cervical adenopathy.   Skin:     General: Skin is warm and dry.   Neurological:      Mental Status: She is alert and oriented to person, place, and time.      Gait: Gait is intact.   Psychiatric:         Mood and Affect: Mood and affect normal.         Behavior: Behavior normal.       SOFT TISSUE ASSESSMENT: Hypertonicity and tenderness palpated B C5-T6 erector  spinae, R upper traps, R lev scap, R SCM, R rhomboid, R QL, R glute med/min, R hip flexor JOINT RECTRICTIONS: C5-T6, L2-S1 and R SIJ   Return in about 1 week (around 5/13/2024) for Next scheduled follow up.

## 2024-05-13 ENCOUNTER — TELEPHONE (OUTPATIENT)
Age: 24
End: 2024-05-13

## 2024-05-13 NOTE — TELEPHONE ENCOUNTER
Patient called office to ask if she should be calling office or not because she believes she has been having more food craving with birth control because it was switched to generic and she wants it switched back. I explained the order was not switched by provider, that that she should call pharmacy to ask why it was switched to generic and if it can be switched back. I also told her if she would want to switch birth controls all together I would ask provider, she said not at this time.

## 2024-05-28 ENCOUNTER — APPOINTMENT (OUTPATIENT)
Dept: LAB | Facility: CLINIC | Age: 24
End: 2024-05-28
Payer: COMMERCIAL

## 2024-05-28 ENCOUNTER — TELEPHONE (OUTPATIENT)
Age: 24
End: 2024-05-28

## 2024-05-28 DIAGNOSIS — Z11.3 SCREENING FOR STD (SEXUALLY TRANSMITTED DISEASE): ICD-10-CM

## 2024-05-28 DIAGNOSIS — Z11.3 ROUTINE SCREENING FOR STI (SEXUALLY TRANSMITTED INFECTION): ICD-10-CM

## 2024-05-28 DIAGNOSIS — D3A.8 NEUROENDOCRINE TUMOR: ICD-10-CM

## 2024-05-28 DIAGNOSIS — Z11.3 ROUTINE SCREENING FOR STI (SEXUALLY TRANSMITTED INFECTION): Primary | ICD-10-CM

## 2024-05-28 LAB
HBV SURFACE AG SER QL: NORMAL
HCV AB SER QL: NORMAL
TREPONEMA PALLIDUM IGG+IGM AB [PRESENCE] IN SERUM OR PLASMA BY IMMUNOASSAY: NORMAL

## 2024-05-28 PROCEDURE — 86803 HEPATITIS C AB TEST: CPT

## 2024-05-28 PROCEDURE — 87340 HEPATITIS B SURFACE AG IA: CPT

## 2024-05-28 PROCEDURE — 87389 HIV-1 AG W/HIV-1&-2 AB AG IA: CPT

## 2024-05-28 PROCEDURE — 36415 COLL VENOUS BLD VENIPUNCTURE: CPT

## 2024-05-28 PROCEDURE — 86780 TREPONEMA PALLIDUM: CPT

## 2024-05-28 PROCEDURE — 87491 CHLMYD TRACH DNA AMP PROBE: CPT

## 2024-05-28 PROCEDURE — 87591 N.GONORRHOEAE DNA AMP PROB: CPT

## 2024-05-28 NOTE — TELEPHONE ENCOUNTER
Placed call to patient. Advised all labs are available to get drawn at her convenience. Patient declined further questions.

## 2024-05-28 NOTE — TELEPHONE ENCOUNTER
Patient called inquiring if blood work for STD testing was still active in computer from January. Advised patient she can go to any [facility]'s lab to have blood drawn. Patient is requesting urine G/C added due to having a new sexual partner. Message routed to provider.

## 2024-05-29 LAB
C TRACH DNA SPEC QL NAA+PROBE: NEGATIVE
HIV 1+2 AB+HIV1 P24 AG SERPL QL IA: NORMAL
HIV 2 AB SERPL QL IA: NORMAL
HIV1 AB SERPL QL IA: NORMAL
HIV1 P24 AG SERPL QL IA: NORMAL
N GONORRHOEA DNA SPEC QL NAA+PROBE: NEGATIVE

## 2024-06-25 ENCOUNTER — EVALUATION (OUTPATIENT)
Dept: PHYSICAL THERAPY | Facility: REHABILITATION | Age: 24
End: 2024-06-25
Payer: COMMERCIAL

## 2024-06-25 DIAGNOSIS — M54.6 CHRONIC RIGHT-SIDED THORACIC BACK PAIN: ICD-10-CM

## 2024-06-25 DIAGNOSIS — G89.29 CHRONIC RIGHT-SIDED THORACIC BACK PAIN: ICD-10-CM

## 2024-06-25 PROCEDURE — 97161 PT EVAL LOW COMPLEX 20 MIN: CPT | Performed by: PHYSICAL THERAPIST

## 2024-06-25 PROCEDURE — 97110 THERAPEUTIC EXERCISES: CPT | Performed by: PHYSICAL THERAPIST

## 2024-06-25 NOTE — PROGRESS NOTES
PT Evaluation     Today's date: 2024  Patient name: Madeleine Mccoy  : 2000  MRN: [id number]  Referring provider: Mia Rivers C*  Dx:   Encounter Diagnosis     ICD-10-CM    1. Chronic right-sided thoracic back pain  M54.6 Ambulatory Referral to Physical Therapy    G89.29                      Assessment  Impairments: abnormal or restricted ROM, activity intolerance, impaired physical strength, lacks appropriate home exercise program, pain with function, weight-bearing intolerance and poor posture   Symptom irritability: low    Assessment details: Madeleine Mccoy is a 24 y.o. female who presents to physical therapy with diagnosis of Chronic right-sided thoracic back pain . Madeleine presents with pain, abnormal posture, and limitations in range of motion, strength, joint mobility, flexibility, and functional ability. The current limitations are affecting Madeleine's ability to function at prior level. She will benefit from skilled physical therapy to address the current impairments and functional limitations to enable her to return to daily activities at maximal level. Thank you for the referral.    Understanding of Dx/Px/POC: good     Prognosis: good    Goals  STG  Patient will decrease pain at worst to 5/10  Patient will demonstrate pain free thoracic AROM  Patient will improve UE strength 1/3 grade in all deficit planes  Patient will be independent with basic HEP    LTG  Patient will decrease pain at worst to 0-2/10  Patient will improve UE strength to at least 4+ grade in all deficit planes  Patient will return to working out without limitations  Patient will improve FOTO greater than or equal to projected score  Patient will be independent with comprehensive HEP    Plan  Patient would benefit from: skilled physical therapy  Planned modality interventions: cryotherapy and thermotherapy: hydrocollator packs    Planned therapy interventions: manual therapy, neuromuscular  re-education, patient education, therapeutic activities, therapeutic exercise, therapeutic training and home exercise program    Frequency: 2x week  Duration in weeks: 6  Plan of Care beginning date: 2024  Plan of Care expiration date: 2024  Treatment plan discussed with: patient        Subjective Evaluation    History of Present Illness  Mechanism of injury: Madeleine is a 24 y.o. female presenting to physical therapy on 24 with primary complaints of thoracic back pain R>L. She mentions that she has had 4 concussions in high school and the last one included whiplash that was untreated. She has had neck and upper back pain since. She also notes her senior year of high school she threw her right upper back out and it has been tight since. She is consistent with working out but has put it on hold due to pain. She is able to complete ADLs and household chores but does have pain while performing them. 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 She would like to return to working out without limitations.     Patient Goals  Patient goals for therapy: decreased pain, increased strength and increased motion  Patient goal: be able to workout without pain, releave back tension  Pain  Current pain ratin  At best pain ratin  At worst pain ratin  Quality: sharp, dull ache and tight  Relieving factors: ice and heat (yoga)  Aggravating factors: lifting (squatting)    Social Support  Steps to enter house: yes  Stairs in house: yes   Patient lives at: split level.    Employment status: working ()    Diagnostic Tests  No diagnostic tests performed  Treatments  Previous treatment: chiropractic and massage        Objective    Posture: fair, rounded shoulders.    Palpation: hypertonicity and trigger points along bilateral (R>L) thoracic paraspinals, upper traps, and mid traps, and suboccipitals    Cervical AROM:  Flexion: 55 degrees  Extension: 60 degrees  R Side Bendin degrees  "with pain  L Side Bendin degrees with pain  R Rotation: 65 degrees with pain  L Rotation:  65 degrees with pain    Thoracic AROM:  Flexion: 25% limited  Extension: 25% limited with pain  R Side Bending: WFL with pain  L Side Bending: WFL with pain  R Rotation: WFL with pain  L Rotation:  WFL with pain    Shoulder AROM: WFL bilaterally    UE Strength  LEFT:  Sh Flexion: 5/5  Sh Abduction:  4+/5  Sh Internal Rotation (0*): 5/5  Sh External Rotation (0*):  5/5  [place] Flexion: 5/5  [place] Ext: 5/5  Mid Trap: 4-/5  Low Trap: 3+/5     RIGHT:  Sh Flexion: 5/5  Sh Abduction:  4+/5  Sh Internal Rotation (0*): 5/5  Sh External Rotation (0*):  5/5  [place] Flexion: 5/5  Elb Ext: 5/5  Mid Trap: 3+/5  Low Trap: 3+/5     Joint Mobility: hypomobility throughout thoracic. Cervical and lumbar WFL.         Precautions: none  Acces code: [id number]     Manuals                 STM/TpR  Bilat UT, bilat T/s paraspinals                 Thoracic PAs                                                        Neuro Re-Ed                  Pt education Prognosis, diagnosis, HEP                Seated Scap Retract 5\"x10          Prone scapular depression + retraction nv                Banded row  nv          Banded I & T nv               Open books* 5\"x10                Thoracic extension 5\"x10               Prone Row nv          Prone I, T, Y nv                                Ther Ex                  UBE           Serratus punch  nv                No monies   nv               Overhead Press           Lateral Raise           Front Raise                                        Ther Activity                  Overhead carry                 Banded ER + overhead press                  Wall angels  nv                                                    Modalities                                                               "

## 2024-06-27 ENCOUNTER — APPOINTMENT (OUTPATIENT)
Dept: PHYSICAL THERAPY | Facility: REHABILITATION | Age: 24
End: 2024-06-27
Payer: COMMERCIAL

## 2024-06-27 ENCOUNTER — OFFICE VISIT (OUTPATIENT)
Dept: PHYSICAL THERAPY | Facility: REHABILITATION | Age: 24
End: 2024-06-27
Payer: COMMERCIAL

## 2024-06-27 DIAGNOSIS — G89.29 CHRONIC RIGHT-SIDED THORACIC BACK PAIN: Primary | ICD-10-CM

## 2024-06-27 DIAGNOSIS — M54.6 CHRONIC RIGHT-SIDED THORACIC BACK PAIN: Primary | ICD-10-CM

## 2024-06-27 PROCEDURE — 97140 MANUAL THERAPY 1/> REGIONS: CPT

## 2024-06-27 PROCEDURE — 97110 THERAPEUTIC EXERCISES: CPT

## 2024-06-27 PROCEDURE — 97112 NEUROMUSCULAR REEDUCATION: CPT

## 2024-06-27 NOTE — PROGRESS NOTES
"Daily Note     Today's date: 2024  Patient name: Madeleine Mccoy  : 2000  MRN: [id number]  Referring provider: Mia Rivers C*  Dx:   Encounter Diagnosis     ICD-10-CM    1. Chronic right-sided thoracic back pain  M54.6     G89.29                      Subjective: Madeleine is compliant with HEP since IE, she reports no pain to start the session.     She gets N+T in R>L with OH lifting heavy weight- holding a plate OH while doing core work at the gym.     Objective: See treatment diary below      Assessment: Tolerated treatment well. Madeleine demonstrated good thoracis mobility with extension and rotations, but poor periscapular engagement. Overactive UT noted with most tasks.  C/o N+T along the medial aspect of the R forearm with OH motion, relieved with rest- add ulnar glide PRN. Positive results to soft tissue work to UT- EDU on postural awareness/compensatory patterns with shoulder hiking. Continued PT would be beneficial to improve function.          Plan: Continue per plan of care.       Precautions: none  Acces code: [id number]     Manuals                STM/TpR  Bilat UT, bilat T/s paraspinals  Done bilat UT in supine               Thoracic PAs                                                        Neuro Re-Ed                  Pt education Prognosis, diagnosis, HEP                Seated Scap Retract 5\"x10 HEP         Prone scapular depression + retraction nv 2x10x5 sec               Banded row  nv Blue 2x10         Banded I & T nv               Open books* 5\"x10 5\"x15               Thoracic extension 5\"x10 5\"x15              Prone Row nv          Prone I, T, Y nv          Ulnar glides ?                      Ther Ex                  UBE  5' retro         Serratus punch  nv 3# 2x10x5\"               No monies   nv OTB 2x10              Overhead Press           Lateral Raise           Front Raise                                        Ther Activity                  Overhead carry "                 Banded ER + overhead press                  Wall angels  nv 2x10                                                   Modalities

## 2024-07-01 ENCOUNTER — APPOINTMENT (OUTPATIENT)
Dept: PHYSICAL THERAPY | Facility: REHABILITATION | Age: 24
End: 2024-07-01
Payer: COMMERCIAL

## 2024-07-02 DIAGNOSIS — Z30.011 ENCOUNTER FOR INITIAL PRESCRIPTION OF CONTRACEPTIVE PILLS: ICD-10-CM

## 2024-07-03 RX ORDER — NORGESTREL AND ETHINYL ESTRADIOL 0.3-0.03MG
1 KIT ORAL DAILY
Qty: 84 TABLET | Refills: 1 | Status: SHIPPED | OUTPATIENT
Start: 2024-07-03

## 2024-07-09 ENCOUNTER — OFFICE VISIT (OUTPATIENT)
Dept: PHYSICAL THERAPY | Facility: REHABILITATION | Age: 24
End: 2024-07-09
Payer: COMMERCIAL

## 2024-07-09 DIAGNOSIS — G89.29 CHRONIC RIGHT-SIDED THORACIC BACK PAIN: Primary | ICD-10-CM

## 2024-07-09 DIAGNOSIS — M54.6 CHRONIC RIGHT-SIDED THORACIC BACK PAIN: Primary | ICD-10-CM

## 2024-07-09 PROCEDURE — 97112 NEUROMUSCULAR REEDUCATION: CPT

## 2024-07-09 PROCEDURE — 97110 THERAPEUTIC EXERCISES: CPT

## 2024-07-09 NOTE — PROGRESS NOTES
"Daily Note     Today's date: 2024  Patient name: Madeleine Mccoy  : 2000  MRN: [id number]  Referring provider: Mia Rivers C*  Dx:   Encounter Diagnosis     ICD-10-CM    1. Chronic right-sided thoracic back pain  M54.6     G89.29                      Subjective: Patient reports her neck still feels pretty stiff.       Objective: See treatment diary below      Assessment: Tolerated treatment well. Patient would benefit from continued PT      Plan: Continue per plan of care.      Precautions: none  Acces code: [id number]     Manuals              STM/TpR  Bilat UT, bilat T/s paraspinals  Done bilat UT in supine  HA             Thoracic PAs                                                        Neuro Re-Ed                  Pt education Prognosis, diagnosis, HEP                Seated Scap Retract 5\"x10 HEP         Prone scapular depression + retraction nv 2x10x5 sec               Banded extensions   Yellow 2x10        Banded row  nv Blue 2x10 Blue 2x10        Banded I & T nv               Open books* 5\"x10 5\"x15               Thoracic extension 5\"x10 5\"x15 10\"x10             Prone Row nv          Prone I, T, Y nv  X20 ea        Ulnar glides ?                      Ther Ex                  UBE  5' retro 5' retro         Serratus punch  nv 3# 2x10x5\"               No monies   nv OTB 2x10 GTB 2x10             Overhead Press           Lateral Raise           Front Raise           UT stretch   20\"x3 ea        Levator stretch    20\"x3 ea             Ther Activity                  Overhead carry                 Banded ER + overhead press                  Wall angels  nv 2x10                                                   Modalities                                                                 "

## 2024-07-15 ENCOUNTER — OFFICE VISIT (OUTPATIENT)
Dept: PHYSICAL THERAPY | Facility: REHABILITATION | Age: 24
End: 2024-07-15
Payer: COMMERCIAL

## 2024-07-15 DIAGNOSIS — M54.6 CHRONIC RIGHT-SIDED THORACIC BACK PAIN: Primary | ICD-10-CM

## 2024-07-15 DIAGNOSIS — G89.29 CHRONIC RIGHT-SIDED THORACIC BACK PAIN: Primary | ICD-10-CM

## 2024-07-15 PROCEDURE — 97112 NEUROMUSCULAR REEDUCATION: CPT

## 2024-07-15 PROCEDURE — 97110 THERAPEUTIC EXERCISES: CPT

## 2024-07-15 NOTE — PROGRESS NOTES
"Daily Note     Today's date: 7/15/2024  Patient name: Madeleine Mccoy  : 2000  MRN: [id number]  Referring provider: Mia Rivers C*  Dx:   Encounter Diagnosis     ICD-10-CM    1. Chronic right-sided thoracic back pain  M54.6     G89.29           Start Time: 1730  Stop Time: 1815  Total time in clinic (min): 45 minutes    Subjective: Pt reports her back is feeling good. Her upper neck still feels tight.       Objective: See treatment diary below      Assessment: Tolerated treatment well. Pt initiated s/l cervical LF with verbal cues for neutral spine and eccentric control with noted reduction in upper trap stiffness towards end of repetitions. Pt continues to require verbal and tactile cues to maintain scapular depression and retraction with posterior trunk strengthening progressions with less upper trap activation observed. Patient would benefit from continued PT      Plan: Continue per plan of care.      Precautions: none  Acces code: [id number]     Manuals 6/25 6/27  7/9 7/15           STM/TpR  Bilat UT, bilat T/s paraspinals  Done bilat UT in supine  HA  nv           Thoracic PAs                                                        Neuro Re-Ed                  Pt education Prognosis, diagnosis, HEP                Seated Scap Retract 5\"x10 HEP         Prone scapular depression + retraction nv 2x10x5 sec               Banded extensions   Yellow 2x10 Yellow 2x10       Banded row  nv Blue 2x10 Blue 2x10 Blue 2x10       Banded I & T nv               Open books* 5\"x10 5\"x15               Thoracic extension 5\"x10 5\"x15 10\"x10 nv           Prone Row nv          Prone I, T, Y nv  X20 ea 3\"x10 ea       Ulnar glides ?           S/l cervical LF    2x15 ea        Ther Ex                  UBE  5' retro 5' retro  5' retro        Serratus punch  nv 3# 2x10x5\"               No monies   nv OTB 2x10 GTB 2x10 GTB 2x10           Overhead Press           Lateral Raise           Front Raise           UT stretch " "  20\"x3 ea 20\"x3 ea       Levator stretch    20\"x3 ea 20\"x3 ea           Ther Activity                  Overhead carry                 Banded ER + overhead press                  Wall angels  nv 2x10    nv                                               Modalities                                                                   "

## 2024-07-17 NOTE — PROGRESS NOTES
"Daily Note     Today's date: 2024  Patient name: Madeleine Mccoy  : 2000  MRN: [id number]  Referring provider: Mia Rivers C*  Dx:   Encounter Diagnosis     ICD-10-CM    1. Chronic right-sided thoracic back pain  M54.6     G89.29           Start Time: 1720  Stop Time: 1800  Total time in clinic (min): 40 minutes    Subjective: Pt reports increased neck soreness, her mid back has been feeling good.       Objective: See treatment diary below      Assessment: Tolerated treatment well. Pt initiated cervical and thoracic mobility with peanut ball, instructed pt to work through full available range while working through diaphragmatic breathing with noted reduction in stiffness observed. Pt initiated resisted extension and abduction with increased neck tension, provided verbal and tactile cues for scapular depression and neutral head position with less compensatory strategies observed. Patient exhibited good technique with therapeutic exercises and would benefit from continued PT      Plan: Continue per plan of care.      Precautions: none  Acces code: [email or web address]     Manuals 6/25 6/27  7/9 7/15  7/18         STM/TpR  Bilat UT, bilat T/s paraspinals  Done bilat UT in supine  HA  nv           Thoracic PAs                                                        Neuro Re-Ed                  Pt education Prognosis, diagnosis, HEP                Seated Scap Retract 5\"x10 HEP         Prone scapular depression + retraction nv 2x10x5 sec               Banded extensions   Yellow 2x10 Yellow 2x10       Banded row  nv Blue 2x10 Blue 2x10 Blue 2x10 Blue 2x10      Banded I & T nv     [place] 2x10 ea         Open books* 5\"x10 5\"x15               Thoracic extension 5\"x10 5\"x15 10\"x10 nv           Prone Row nv          Prone I, T, Y nv  X20 ea 3\"x10 ea nv      Ulnar glides ?           Chin tuck     Peanut ball x10      Shoulder AAROM w/ peanut ball     3x10 ea      S/l cervical LF    2x15 ea  2x15 ea       Ther Ex  " "                UBE  5' retro 5' retro  5' retro  5' retro       Serratus punch  nv 3# 2x10x5\"               No monies   nv OTB 2x10 GTB 2x10 GTB 2x10 GTB 2x10         Overhead Press           Lateral Raise           Front Raise           UT stretch   20\"x3 ea 20\"x3 ea HEP      Levator stretch    20\"x3 ea 20\"x3 ea HEP         Ther Activity                  Overhead carry                 Banded ER + overhead press                  Wall angels  nv 2x10    nv  2x10                                             Modalities                                                                     "

## 2024-07-18 ENCOUNTER — OFFICE VISIT (OUTPATIENT)
Dept: PHYSICAL THERAPY | Facility: REHABILITATION | Age: 24
End: 2024-07-18
Payer: COMMERCIAL

## 2024-07-18 DIAGNOSIS — M54.6 CHRONIC RIGHT-SIDED THORACIC BACK PAIN: Primary | ICD-10-CM

## 2024-07-18 DIAGNOSIS — G89.29 CHRONIC RIGHT-SIDED THORACIC BACK PAIN: Primary | ICD-10-CM

## 2024-07-18 PROCEDURE — 97110 THERAPEUTIC EXERCISES: CPT

## 2024-07-18 PROCEDURE — 97112 NEUROMUSCULAR REEDUCATION: CPT

## 2024-07-26 NOTE — PROGRESS NOTES
"Daily Note     Today's date: 2024  Patient name: Madeleine Mccoy  : 2000  MRN: [id number]  Referring provider: Mia Rivers C*  Dx:   Encounter Diagnosis     ICD-10-CM    1. Chronic right-sided thoracic back pain  M54.6     G89.29           Start Time: 1700  Stop Time: 1745  Total time in clinic (min): 45 minutes    Subjective: Pt reports her mid back has been feeling good however her R neck has been tense.       Objective: See treatment diary below      Assessment: Tolerated treatment well. Initiated IASTM to upper traps with increased redness and petechiae due to increased tone however noted reduction in stiffness evident during upper trap stretch. Pt initiated banded overhead press with increased upper trap activation, provided verbal and tactile cues to maintain scapular depression and retraction with less compensatory strategies observed. Patient demonstrated fatigue post treatment, exhibited good technique with therapeutic exercises, and would benefit from continued PT      Plan: Continue per plan of care.      Precautions: none  Acces code: [email or web address]     Manuals 6/25 6/27  7/9 7/15  7/18  7/29       STM/TpR  Bilat UT, bilat T/s paraspinals  Done bilat UT in supine  HA  nv           Thoracic PAs                  IASTM          Done; upper traps                          Neuro Re-Ed                  Pt education Prognosis, diagnosis, HEP                Seated Scap Retract 5\"x10 HEP         Prone scapular depression + retraction nv 2x10x5 sec               Banded extensions   Yellow 2x10 Yellow 2x10       Banded row  nv Blue 2x10 Blue 2x10 Blue 2x10 Blue 2x10 Blue 2x10     Banded I & T nv     [place] 2x10 ea [place] 2x10 ea       Open books* 5\"x10 5\"x15               Thoracic extension 5\"x10 5\"x15 10\"x10 nv           Prone Row nv          Prone I, T, Y nv  X20 ea 3\"x10 ea np      Eccentric shoulder shrugs      20# 2x15     Chin tuck     Peanut ball x10 Peanut ball x10     Shoulder AAROM w/ " "peanut ball     3x10 ea reviewed     S/l cervical LF    2x15 ea  2x15 ea  2x15 ea      Ther Ex                  UBE  5' retro 5' retro  5' retro  5' retro  5' retro      Serratus punch  nv 3# 2x10x5\"               No monies   nv OTB 2x10 GTB 2x10 GTB 2x10 GTB 2x10  GTB 2x15       Overhead Press           Lateral Raise           High row      nv     UT stretch   20\"x3 ea 20\"x3 ea HEP Post IASTM     Levator stretch    20\"x3 ea 20\"x3 ea HEP         Ther Activity                  Overhead carry                 Banded ER/IR + overhead press          Purple x15 ea       Wall angels  nv 2x10    nv  2x10  2x10       [place]namese press         Purple 2x10                          Modalities                                                                       "

## 2024-07-29 ENCOUNTER — OFFICE VISIT (OUTPATIENT)
Dept: PHYSICAL THERAPY | Facility: REHABILITATION | Age: 24
End: 2024-07-29
Payer: COMMERCIAL

## 2024-07-29 DIAGNOSIS — M54.6 CHRONIC RIGHT-SIDED THORACIC BACK PAIN: Primary | ICD-10-CM

## 2024-07-29 DIAGNOSIS — G89.29 CHRONIC RIGHT-SIDED THORACIC BACK PAIN: Primary | ICD-10-CM

## 2024-07-29 PROCEDURE — 97530 THERAPEUTIC ACTIVITIES: CPT

## 2024-07-29 PROCEDURE — 97140 MANUAL THERAPY 1/> REGIONS: CPT

## 2024-07-29 PROCEDURE — 97112 NEUROMUSCULAR REEDUCATION: CPT

## 2024-07-30 NOTE — PROGRESS NOTES
"Daily Note     Today's date: 2024  Patient name: Madeleine Mccoy  : 2000  MRN: [id number]  Referring provider: Mia Rivers C*  Dx:   Encounter Diagnosis     ICD-10-CM    1. Chronic right-sided thoracic back pain  M54.6     G89.29           Start Time: 1700  Stop Time: 1745  Total time in clinic (min): 45 minutes    Subjective: Pt reports her neck and back feeling good. She states she gets b/l occasional numbness radiating down b/l UE during back strengthening or pec stretches.       Objective: See treatment diary below   strength b/l 60 lbs    Assessment: Tolerated treatment well. Initiated STM to b/l pecs with significant TTP however noted reduction in stiffness evident during foam roller shoulder AAROM. Pt initiated pec minor strengthening with verbal cues for eccentric control and higher repetitions with noted fatigue towards end of repetitions. Patient exhibited good technique with therapeutic exercises and would benefit from continued PT      Plan: Continue per plan of care.      Precautions: none  Acces code: [email or web address]     Manuals 6/25 6/27  7/9 7/15  7/18  7/29 8/1     STM/TpR  Bilat UT, bilat T/s paraspinals  Done bilat UT in supine  HA  nv      pecs     Thoracic PAs                  IASTM          Done;upper traps                          Neuro Re-Ed                  Pt education Prognosis, diagnosis, HEP                Supine punch 5\"x10 HEP     8# 5\"x20 ea    Banded depression nv 2x10x5 sec         yell tb 2x15     Pec flys over half foam roller           Banded extensions   Yellow 2x10 Yellow 2x10       Banded row  nv Blue 2x10 Blue 2x10 Blue 2x10 Blue 2x10 Blue 2x10 Blue 2x10    Banded I & T nv     [place] 2x10 ea [place] 2x10 ea [place] 2x10 ea     Open books* 5\"x10 5\"x15               Thoracic extension 5\"x10 5\"x15 10\"x10 nv           Prone Row nv          Prone I, T, Y nv  X20 ea 3\"x10 ea np      Eccentric shoulder shrugs      20# 2x15 20# 2x15    Chin tuck     Peanut ball x10 " "Peanut ball x10     Shoulder AAROM w/ peanut ball     3x10 ea reviewed On foam roller x10 ea    S/l cervical LF    2x15 ea  2x15 ea  2x15 ea      Ther Ex                  UBE  5' retro 5' retro  5' retro  5' retro  5' retro  5' retro     Serratus punch  nv 3# 2x10x5\"               No monies   nv OTB 2x10 GTB 2x10 GTB 2x10 GTB 2x10  GTB 2x15  GTB 2x15     Overhead Press           Lateral Raise           High row       nv    UT stretch   20\"x3 ea 20\"x3 ea HEP Post IASTM     Levator stretch    20\"x3 ea 20\"x3 ea HEP         Ther Activity                  Overhead carry                 Banded ER/IR + overhead press          Purple x15 ea Purple x15 ea     Wall angels  nv 2x10    nv  2x10  2x10       Enoc press         Purple 2x10 Purple 2x10                        Modalities                                                                         " normal (ped)...

## 2024-08-01 ENCOUNTER — OFFICE VISIT (OUTPATIENT)
Dept: PHYSICAL THERAPY | Facility: REHABILITATION | Age: 24
End: 2024-08-01
Payer: COMMERCIAL

## 2024-08-01 DIAGNOSIS — G89.29 CHRONIC RIGHT-SIDED THORACIC BACK PAIN: Primary | ICD-10-CM

## 2024-08-01 DIAGNOSIS — M54.6 CHRONIC RIGHT-SIDED THORACIC BACK PAIN: Primary | ICD-10-CM

## 2024-08-01 PROCEDURE — 97140 MANUAL THERAPY 1/> REGIONS: CPT

## 2024-08-01 PROCEDURE — 97112 NEUROMUSCULAR REEDUCATION: CPT

## 2024-08-01 PROCEDURE — 97110 THERAPEUTIC EXERCISES: CPT

## 2024-08-05 ENCOUNTER — APPOINTMENT (OUTPATIENT)
Dept: PHYSICAL THERAPY | Facility: REHABILITATION | Age: 24
End: 2024-08-05
Payer: COMMERCIAL

## 2024-08-05 NOTE — PROGRESS NOTES
"Daily Note     Today's date: 2024  Patient name: Madeleine Mccoy  : 2000  MRN: [id number]  Referring provider: Mia Rivers C*  Dx: No diagnosis found.               Subjective: ***      Objective: See treatment diary below      Assessment: Tolerated treatment well. Patient demonstrated fatigue post treatment, exhibited good technique with therapeutic exercises, and would benefit from continued PT      Plan: Continue per plan of care.      Precautions: none  Acces code: [email or web address]     Manuals 6/25 6/27  7/9 7/15  7/18  7/29 8/1     STM/TpR  Bilat UT, bilat T/s paraspinals  Done bilat UT in supine  HA  nv      pecs     Thoracic PAs                  IASTM          Done;upper traps                          Neuro Re-Ed                  Pt education Prognosis, diagnosis, HEP                Supine punch 5\"x10 HEP     8# 5\"x20 ea    Banded depression nv 2x10x5 sec         yell tb 2x15     Pec flys over half foam roller           Banded extensions   Yellow 2x10 Yellow 2x10       Banded row  nv Blue 2x10 Blue 2x10 Blue 2x10 Blue 2x10 Blue 2x10 Blue 2x10    Banded I & T nv     Ferry 2x10 ea Ferry 2x10 ea Ferry 2x10 ea     Open books* 5\"x10 5\"x15               Thoracic extension 5\"x10 5\"x15 10\"x10 nv           Prone Row nv          Prone I, T, Y nv  X20 ea 3\"x10 ea np      Eccentric shoulder shrugs      20# 2x15 20# 2x15    Chin tuck     Peanut ball x10 Peanut ball x10     Shoulder AAROM w/ peanut ball     3x10 ea reviewed On foam roller x10 ea    S/l cervical LF    2x15 ea  2x15 ea  2x15 ea      Ther Ex                  UBE  5' retro 5' retro  5' retro  5' retro  5' retro  5' retro     Serratus punch  nv 3# 2x10x5\"               No monies   nv OTB 2x10 GTB 2x10 GTB 2x10 GTB 2x10  GTB 2x15  GTB 2x15     Overhead Press           Lateral Raise           High row       nv    UT stretch   20\"x3 ea 20\"x3 ea HEP Post IASTM     Levator stretch    20\"x3 ea 20\"x3 ea HEP         Ther Activity                "   Overhead carry                 Banded ER/IR + overhead press          Purple x15 ea Purple x15 ea     Wall angels  nv 2x10    nv  2x10  2x10       Enoc press         Purple 2x10 Purple 2x10                        Modalities

## 2024-08-08 ENCOUNTER — OFFICE VISIT (OUTPATIENT)
Dept: PHYSICAL THERAPY | Facility: REHABILITATION | Age: 24
End: 2024-08-08
Payer: COMMERCIAL

## 2024-08-08 DIAGNOSIS — M54.6 CHRONIC RIGHT-SIDED THORACIC BACK PAIN: Primary | ICD-10-CM

## 2024-08-08 DIAGNOSIS — G89.29 CHRONIC RIGHT-SIDED THORACIC BACK PAIN: Primary | ICD-10-CM

## 2024-08-08 PROCEDURE — 97110 THERAPEUTIC EXERCISES: CPT

## 2024-08-08 PROCEDURE — 97112 NEUROMUSCULAR REEDUCATION: CPT

## 2024-08-08 PROCEDURE — 97140 MANUAL THERAPY 1/> REGIONS: CPT

## 2024-08-08 NOTE — PROGRESS NOTES
"Daily Note     Today's date: 2024  Patient name: Madeleine Mccoy  : 2000  MRN: [id number]  Referring provider: Mia Rivers C*  Dx:   Encounter Diagnosis     ICD-10-CM    1. Chronic right-sided thoracic back pain  M54.6     G89.29                      Subjective: Patient noted no new complaints.       Objective: See treatment diary below      Assessment: Tolerated treatment fair. Patient exhibited good technique with therapeutic exercises and would benefit from continued PT  STM to pecs helped to decrease pec muscle tightness. Patient was able to perform listed exercises. Added in high rows with no patient c/o pain. Not all exercises performed due to time resume nv.       Plan: Continue per plan of care.      Precautions: none  Acces code: [email or web address]     Manuals 6/25 6/27  7/9 7/15  7/18  7/29 8/1  8/8   STM/TpR  Bilat UT, bilat T/s paraspinals  Done bilat UT in supine  HA  nv      pecs  done   Thoracic PAs                  IASTM          Done;upper traps                          Neuro Re-Ed                  Pt education Prognosis, diagnosis, HEP                Supine punch 5\"x10 HEP     8# 5\"x20 ea 8# 5\" x 20 ea   Banded depression nv 2x10x5 sec         yell tb 2x15  yellow 2 x 15   Pec flys over half foam roller           Banded extensions   Yellow 2x10 Yellow 2x10       Banded row  nv Blue 2x10 Blue 2x10 Blue 2x10 Blue 2x10 Blue 2x10 Blue 2x10 Blue 2 x 10   Banded I & T nv     [place] 2x10 ea [place] 2x10 ea [place] 2x10 ea  yell 2 x 10 ea   Open books* 5\"x10 5\"x15               Thoracic extension 5\"x10 5\"x15 10\"x10 nv           Prone Row nv          Prone I, T, Y nv  X20 ea 3\"x10 ea np      Eccentric shoulder shrugs      20# 2x15 20# 2x15    Chin tuck     Peanut ball x10 Peanut ball x10     Shoulder AAROM w/ peanut ball     3x10 ea reviewed On foam roller x10 ea On foam roller x10 ea   S/l cervical LF    2x15 ea  2x15 ea  2x15 ea      Ther Ex                  UBE  5' retro 5' retro  5' retro  " "5' retro  5' retro  5' retro  5' retro   Serratus punch  nv 3# 2x10x5\"               No monies   nv OTB 2x10 GTB 2x10 GTB 2x10 GTB 2x10  GTB 2x15  GTB 2x15     Overhead Press           Lateral Raise           High row       nv Red 2 x 10   UT stretch   20\"x3 ea 20\"x3 ea HEP Post IASTM     Levator stretch    20\"x3 ea 20\"x3 ea HEP         Ther Activity                  Overhead carry                 Banded ER/IR + overhead press          Purple x15 ea Purple x15 ea     Wall angels  nv 2x10    nv  2x10  2x10       [place]i press         Purple 2x10 Purple 2x10                        Modalities                                                                           "

## 2024-08-09 NOTE — PROGRESS NOTES
"Daily Note     Today's date: 2024  Patient name: Madeleine Mccoy  : 2000  MRN: [id number]  Referring provider: Mia Rivers C*  Dx:   Encounter Diagnosis     ICD-10-CM    1. Chronic right-sided thoracic back pain  M54.6     G89.29           Start Time: 1705  Stop Time: 1745  Total time in clinic (min): 40 minutes    Subjective: Pt reports steady progress. She states the numbness and radiating pain has been going down with her HEP and mobility. She reports L thenar soreness occasionally with no specific NATACHA.       Objective: See treatment diary below      Assessment: Tolerated treatment well. Initiated IASTM to pecs with increased TTP however noted reduction in shoulder stiffness and no increase in radiating pain. Pt required verbal and tactile cues to initiate motion with scapular setting during high row with less upper trap activation and improved lat activation palpated. Patient demonstrated fatigue post treatment, exhibited good technique with therapeutic exercises, and would benefit from continued PT      Plan: Continue per plan of care.      Precautions: none  Acces code: [id number]     Manuals 8/12   7/9 7/15  7/18  7/29 8/1  8/8   STM/TpR  Bilat UT, bilat T/s paraspinals    HA  nv      pecs  done   Thoracic PAs                 IASTM Pecs done        Done;upper traps                         Neuro Re-Ed                 Pt education                 Supine punch 10# 5\" x 20 ea      8# 5\"x20 ea 8# 5\" x 20 ea   Banded depression red 2x15          yell tb 2x15  yellow 2 x 15   Pec flys over half foam roller 8# 5\"x 2x15          Banded extensions   Yellow 2x10 Yellow 2x10       Banded row  Blue 2x10  Blue 2x10 Blue 2x10 Blue 2x10 Blue 2x10 Blue 2x10 Blue 2 x 10   Banded I & T yell 2x10 ea     [place] 2x10 ea [place] 2x10 ea Sterling 2x10 ea  yell 2 x 10 ea   Open books*                 Thoracic extension   10\"x10 nv           Prone Row           Prone I, T, Y   X20 ea 3\"x10 ea np      Eccentric " "shoulder shrugs      20# 2x15 20# 2x15    Chin tuck     Peanut ball x10 Peanut ball x10     Shoulder AAROM w/ peanut ball On foam roller x10 ea    3x10 ea reviewed On foam roller x10 ea On foam roller x10 ea   S/l cervical LF    2x15 ea  2x15 ea  2x15 ea      Ther Ex                 UBE 5' retro  5' retro  5' retro  5' retro  5' retro  5' retro  5' retro   Serratus punch                 No monies    GTB 2x10 GTB 2x10 GTB 2x10  GTB 2x15  GTB 2x15     Overhead Press           Lateral Raise           High row 35# 2x10 ea; scap set initiate      nv Red 2 x 10   UT stretch   20\"x3 ea 20\"x3 ea HEP Post IASTM     Levator stretch   20\"x3 ea 20\"x3 ea HEP         Ther Activity                 Overhead carry                 Banded ER/IR + overhead press Purple x15 ea        Purple x15 ea Purple x15 ea     Wall angels      nv  2x10  2x10       Bhutanese press Purple 2x10        Purple 2x10 Purple 2x10                       Modalities                                                                         "

## 2024-08-12 ENCOUNTER — OFFICE VISIT (OUTPATIENT)
Dept: PHYSICAL THERAPY | Facility: REHABILITATION | Age: 24
End: 2024-08-12
Payer: COMMERCIAL

## 2024-08-12 DIAGNOSIS — G89.29 CHRONIC RIGHT-SIDED THORACIC BACK PAIN: Primary | ICD-10-CM

## 2024-08-12 DIAGNOSIS — M54.6 CHRONIC RIGHT-SIDED THORACIC BACK PAIN: Primary | ICD-10-CM

## 2024-08-12 PROCEDURE — 97112 NEUROMUSCULAR REEDUCATION: CPT

## 2024-08-12 PROCEDURE — 97110 THERAPEUTIC EXERCISES: CPT

## 2024-08-12 PROCEDURE — 97140 MANUAL THERAPY 1/> REGIONS: CPT

## 2024-08-16 NOTE — PROGRESS NOTES
"Daily Note     Today's date: 2024  Patient name: Madeleine Mccoy  : 2000  MRN: [id number]  Referring provider: Mia Rivers C*  Dx: No diagnosis found.               Subjective: ***      Objective: See treatment diary below      Assessment: Tolerated treatment well. Patient demonstrated fatigue post treatment, exhibited good technique with therapeutic exercises, and would benefit from continued PT      Plan: Continue per plan of care.      Precautions: none  Acces code: [email or web address]     Manuals 8/12   7/9 7/15  7/18  7/29 8/1  8/8   STM/TpR  Bilat UT, bilat T/s paraspinals    HA  nv      pecs  done   Thoracic PAs                 IASTM Pecs done        Done;upper traps                         Neuro Re-Ed                 Pt education                 Supine punch 10# 5\" x 20 ea      8# 5\"x20 ea 8# 5\" x 20 ea   Banded depression red 2x15          yell tb 2x15  yellow 2 x 15   Pec flys over half foam roller 8# 5\"x 2x15          Banded extensions   Yellow 2x10 Yellow 2x10       Banded row  Blue 2x10  Blue 2x10 Blue 2x10 Blue 2x10 Blue 2x10 Blue 2x10 Blue 2 x 10   Banded I & T yell 2x10 ea     [place] 2x10 ea [place] 2x10 ea [place] 2x10 ea  yell 2 x 10 ea   Open books*                 Thoracic extension   10\"x10 nv           Prone Row           Prone I, T, Y   X20 ea 3\"x10 ea np      Eccentric shoulder shrugs      20# 2x15 20# 2x15    Chin tuck     Peanut ball x10 Peanut ball x10     Shoulder AAROM w/ peanut ball On foam roller x10 ea    3x10 ea reviewed On foam roller x10 ea On foam roller x10 ea   S/l cervical LF    2x15 ea  2x15 ea  2x15 ea      Ther Ex                 UBE 5' retro  5' retro  5' retro  5' retro  5' retro  5' retro  5' retro   Serratus punch                 No monies    GTB 2x10 GTB 2x10 GTB 2x10  GTB 2x15  GTB 2x15     Overhead Press           Lateral Raise           High row 35# 2x10 ea; scap set initiate      nv Red 2 x 10   UT stretch   20\"x3 ea 20\"x3 ea HEP Post IASTM     Levator " "stretch   20\"x3 ea 20\"x3 ea HEP         Ther Activity                 Overhead carry                 Banded ER/IR + overhead press Purple x15 ea        Purple x15 ea Purple x15 ea     Wall angels      nv  2x10  2x10       Enoc press Purple 2x10        Purple 2x10 Purple 2x10                       Modalities                                                                           "

## 2024-08-19 ENCOUNTER — APPOINTMENT (OUTPATIENT)
Dept: PHYSICAL THERAPY | Facility: REHABILITATION | Age: 24
End: 2024-08-19
Payer: COMMERCIAL

## 2024-08-23 NOTE — PROGRESS NOTES
"Daily Note     Today's date: 2024  Patient name: Madeleine Mccoy  : 2000  MRN: [id number]  Referring provider: Mia Rivers C*  Dx:   Encounter Diagnosis     ICD-10-CM    1. Chronic right-sided thoracic back pain  M54.6     G89.29           Start Time: 1745  Stop Time: 1830  Total time in clinic (min): 45 minutes    Subjective: Pt reports feeling good.       Objective: See treatment diary below      Assessment: Tolerated treatment well. Provided updated copy of HEP to aide with compliance with good understanding. Pt leaves with all questions answered and provided contact information for future concerns with good understanding. Patient demonstrated fatigue post treatment and exhibited good technique with therapeutic exercises      Plan: Continue per plan of care.      Precautions: none  Acces code: [id number]     Manuals 8/12 8/26  7/15  7/18  7/29 8/1  8/8   STM/TpR  Bilat UT, bilat T/s paraspinals     nv      pecs  done   Thoracic PAs                IASTM Pecs done       Done;upper traps                        Neuro Re-Ed                Pt education                Supine punch 10# 5\" x 20 ea 10# 5\" x 20 ea     8# 5\"x20 ea 8# 5\" x 20 ea   Banded depression red 2x15 red 2x15        yell tb 2x15  yellow 2 x 15   Pec flys over half foam roller 8# 5\"x 2x15 8# 5\"x 2x15         Banded extensions    Yellow 2x10       Banded row  Blue 2x10 Blue 2x10  Blue 2x10 Blue 2x10 Blue 2x10 Blue 2x10 Blue 2 x 10   Banded I & T yell 2x10 ea yell 2x10 ea    [place]oster 2x10 ea Foster 2x10 ea Foster 2x10 ea  yell 2 x 10 ea   Open books*                Thoracic extension    nv           Prone Row           Prone I, T, Y    3\"x10 ea np      Eccentric shoulder shrugs      20# 2x15 20# 2x15    Chin tuck     Peanut ball x10 Peanut ball x10     Shoulder AAROM w/ peanut ball On foam roller x10 ea On foam roller x10 ea   3x10 ea reviewed On foam roller x10 ea On foam roller x10 ea   S/l cervical LF    2x15 ea  2x15 ea  2x15 ea   " "   Ther Ex                UBE 5' retro 5' retro  5' retro  5' retro  5' retro  5' retro  5' retro   Serratus punch                No monies     GTB 2x10 GTB 2x10  GTB 2x15  GTB 2x15     Overhead Press           Lateral Raise           High row 35# 2x10 ea; scap set initiate 35# 2x10 ea; scap set initiate     nv Red 2 x 10   UT stretch    20\"x3 ea HEP Post IASTM     Levator stretch    20\"x3 ea HEP         Ther Activity                Overhead carry                Banded ER/IR + overhead press Purple x15 ea Purple x15 ea      Purple x15 ea Purple x15 ea     Wall angels     nv  2x10  2x10       Enoc press Purple 2x10 Purple 2x10      Purple 2x10 Purple 2x10                      Modalities                                                                          "

## 2024-08-26 ENCOUNTER — OFFICE VISIT (OUTPATIENT)
Dept: PHYSICAL THERAPY | Facility: REHABILITATION | Age: 24
End: 2024-08-26
Payer: COMMERCIAL

## 2024-08-26 DIAGNOSIS — G89.29 CHRONIC RIGHT-SIDED THORACIC BACK PAIN: Primary | ICD-10-CM

## 2024-08-26 DIAGNOSIS — M54.6 CHRONIC RIGHT-SIDED THORACIC BACK PAIN: Primary | ICD-10-CM

## 2024-08-26 PROCEDURE — 97140 MANUAL THERAPY 1/> REGIONS: CPT

## 2024-08-26 PROCEDURE — 97110 THERAPEUTIC EXERCISES: CPT

## 2024-08-26 PROCEDURE — 97112 NEUROMUSCULAR REEDUCATION: CPT

## 2024-09-09 ENCOUNTER — TELEPHONE (OUTPATIENT)
Age: 24
End: 2024-09-09

## 2024-09-09 ENCOUNTER — OFFICE VISIT (OUTPATIENT)
Dept: FAMILY MEDICINE CLINIC | Facility: CLINIC | Age: 24
End: 2024-09-09
Payer: COMMERCIAL

## 2024-09-09 VITALS
HEART RATE: 68 BPM | DIASTOLIC BLOOD PRESSURE: 98 MMHG | BODY MASS INDEX: 26.61 KG/M2 | TEMPERATURE: 97.7 F | OXYGEN SATURATION: 97 % | RESPIRATION RATE: 16 BRPM | WEIGHT: 165.6 LBS | SYSTOLIC BLOOD PRESSURE: 134 MMHG | HEIGHT: 66 IN

## 2024-09-09 DIAGNOSIS — J02.9 SORE THROAT: ICD-10-CM

## 2024-09-09 DIAGNOSIS — K52.9 GASTROENTERITIS: Primary | ICD-10-CM

## 2024-09-09 PROBLEM — J06.9 UPPER RESPIRATORY INFECTION: Status: ACTIVE | Noted: 2024-09-09

## 2024-09-09 LAB — S PYO AG THROAT QL: NEGATIVE

## 2024-09-09 PROCEDURE — 99213 OFFICE O/P EST LOW 20 MIN: CPT | Performed by: FAMILY MEDICINE

## 2024-09-09 PROCEDURE — 87880 STREP A ASSAY W/OPTIC: CPT | Performed by: FAMILY MEDICINE

## 2024-09-09 PROCEDURE — 87147 CULTURE TYPE IMMUNOLOGIC: CPT | Performed by: FAMILY MEDICINE

## 2024-09-09 PROCEDURE — 87070 CULTURE OTHR SPECIMN AEROBIC: CPT | Performed by: FAMILY MEDICINE

## 2024-09-09 NOTE — PROGRESS NOTES
Ambulatory Visit  Name: Madeleine Mccoy      : 2000      MRN: [id number]  Encounter Provider: Andrews Galvez DO  Encounter Date: 2024   Encounter department: [facility]    Assessment & Plan   1. Gastroenteritis  Assessment & Plan:  -Presents with concerns for sore throat, loose stool x 2 and some increased gassy symptoms that started yesterday.   -POCT strep negative.  -With loose stools and gas symptoms discussed possible gastroenteritis.  Could still be strep and will send throat culture out.  -Symptomatic management reviewed to include Tylenol 500 mg every 6 hours, Advil 400 mg every 6 hours for body aches as needed.  -Adequate hydration reviewed.  -Follow-up with culture results.    2. Sore throat  -     POCT rapid ANTIGEN strepA  -     Throat culture         History of Present Illness     Madeleine is a 24-year-old female who presents today for evaluation of sore throat, a few loose stools yesterday and increased gas.  She notes this all started yesterday.  She does admit she may have some postnasal drip and congestion as well.  Does have history of strep and wants to be tested with her sore throat.  Denies any recorded fever, chills, nausea.  Denies any shortness of breath, coughing, wheezing, tightness in her chest.  Denies palpitations or chest pains.  No rash.      Review of Systems   Constitutional:  Negative for chills and fever.   HENT:  Positive for postnasal drip and sore throat. Negative for ear pain.    Eyes:  Negative for pain and visual disturbance.   Respiratory:  Negative for cough and shortness of breath.    Cardiovascular:  Negative for chest pain and palpitations.   Gastrointestinal:  Positive for diarrhea. Negative for abdominal pain, constipation and vomiting.        Gas   Genitourinary:  Negative for dysuria and hematuria.   Musculoskeletal:  Negative for arthralgias and back pain.   Skin:  Negative for color change and rash.   Neurological:   Negative for seizures and syncope.   Psychiatric/Behavioral:  Negative for confusion and sleep disturbance. The patient is not nervous/anxious.    All other systems reviewed and are negative.    Past Medical History:   Diagnosis Date    Endometriosis      Past Surgical History:   Procedure Laterality Date    LEG TENDON SURGERY      WISDOM TOOTH EXTRACTION       Family History   Problem Relation Age of Onset    No Known Problems Mother     No Known Problems Father     No Known Problems Brother     No Known Problems Maternal Grandmother     No Known Problems Maternal Grandfather     Diabetes Paternal Grandmother     Lung disease Paternal Grandmother      Social History     Tobacco Use    Smoking status: Never    Smokeless tobacco: Never   Vaping Use    Vaping status: Never Used   Substance and Sexual Activity    Alcohol use: Yes     Comment: social     Drug use: Not Currently     Types: Marijuana    Sexual activity: Yes     Partners: Male     Birth control/protection: None, OCP     Current Outpatient Medications on File Prior to Visit   Medication Sig    norgestrel-ethinyl estradiol (Low-Ogestrel) 0.3 mg-30 mcg per tablet Take 1 tablet by mouth daily     Allergies   Allergen Reactions    Apple - Food Allergy     Other Other (See Comments)     Per patient, itchy eyes, stuffy nose & sneezing.--Cats    Prunus Persica      Immunization History   Administered Date(s) Administered    COVID-19 PFIZER VACCINE 0.3 ML IM 02/19/2021, 04/19/2021, 05/10/2021    DTaP / HiB 2000    DTaP / HiB / IPV 2000, 2001    DTaP 5 06/27/2002, 05/09/2006    HPV9 06/08/2021, 06/08/2021, 07/27/2021, 07/27/2021, 12/20/2021    Hep A, adult 05/10/2007, 05/12/2008    Hep B, adult 2000, 01/02/2002, 06/27/2002    Hib (PRP-OMP) 03/27/2002    INFLUENZA 2000, 11/20/2010, 11/03/2014, 02/19/2016, 01/30/2018    IPV 06/27/2002, 05/09/2006    Influenza, seasonal, injectable 11/23/2013    MMR 03/27/2002, 04/25/2005    Meningococcal  "MCV4P 10/18/2017    Meningococcal, Unknown Serogroups 05/14/2012    Pneumococcal Conjugate PCV 7 2001, 2001, 2001, 03/27/2002    Tdap 05/14/2012, 06/20/2022    Tuberculin Skin Test-PPD Intradermal 06/20/2022    Varicella 03/27/2002, 05/10/2007     Objective     /98 (BP Location: Left arm, Patient Position: Sitting, Cuff Size: Standard)   Pulse 68   Temp 97.7 °F (36.5 °C) (Tympanic)   Resp 16   Ht 5' 5.9\" (1.674 m)   Wt 75.1 kg (165 lb 9.6 oz)   SpO2 97%   BMI 26.81 kg/m²     Physical Exam  Vitals and nursing note reviewed.   Constitutional:       General: She is not in acute distress.     Appearance: Normal appearance.   HENT:      Head: Normocephalic and atraumatic.      Right Ear: Tympanic membrane and external ear normal.      Left Ear: Tympanic membrane and external ear normal.      Nose: Congestion present.      Mouth/Throat:      Mouth: Mucous membranes are moist.      Comments: Cobblestoning present  Eyes:      Extraocular Movements: Extraocular movements intact.      Conjunctiva/sclera: Conjunctivae normal.      Pupils: Pupils are equal, round, and reactive to light.   Cardiovascular:      Rate and Rhythm: Normal rate and regular rhythm.      Pulses: Normal pulses.      Heart sounds: Normal heart sounds. No murmur heard.  Pulmonary:      Effort: Pulmonary effort is normal.      Breath sounds: Normal breath sounds. No wheezing, rhonchi or rales.   Abdominal:      General: Bowel sounds are normal.      Palpations: Abdomen is soft.      Tenderness: There is no abdominal tenderness. There is no guarding.   Musculoskeletal:         General: Normal range of motion.      Cervical back: Normal range of motion.      Right lower leg: No edema.      Left lower leg: No edema.   Lymphadenopathy:      Cervical: No cervical adenopathy.   Skin:     General: Skin is warm.      Capillary Refill: Capillary refill takes less than 2 seconds.   Neurological:      General: No focal deficit present. "      Mental Status: She is alert and oriented to person, place, and time.   Psychiatric:         Mood and Affect: Mood normal.         Behavior: Behavior normal.

## 2024-09-09 NOTE — LETTER
September 9, 2024     Patient: Madeleine Mccoy  YOB: 2000  Date of Visit: 9/9/2024      To Whom it May Concern:    Madeleine Mccoy is under my professional care. Madeleine was seen in my office on 9/9/2024. Madeleine may return to work on 9/11/2024 .    If you have any questions or concerns, please don't hesitate to call.         Sincerely,          Andrews Galvez, DO        CC: No Recipients

## 2024-09-09 NOTE — ASSESSMENT & PLAN NOTE
-Presents with concerns for sore throat, loose stool x 2 and some increased gassy symptoms that started yesterday.   -POCT strep negative.  -With loose stools and gas symptoms discussed possible gastroenteritis.  Could still be strep and will send throat culture out.  -Symptomatic management reviewed to include Tylenol 500 mg every 6 hours, Advil 400 mg every 6 hours for body aches as needed.  -Adequate hydration reviewed.  -Follow-up with culture results.

## 2024-09-11 ENCOUNTER — OFFICE VISIT (OUTPATIENT)
Dept: FAMILY MEDICINE CLINIC | Facility: CLINIC | Age: 24
End: 2024-09-11
Payer: COMMERCIAL

## 2024-09-11 VITALS
DIASTOLIC BLOOD PRESSURE: 60 MMHG | OXYGEN SATURATION: 98 % | HEART RATE: 93 BPM | WEIGHT: 164 LBS | RESPIRATION RATE: 16 BRPM | TEMPERATURE: 97.3 F | BODY MASS INDEX: 26.36 KG/M2 | HEIGHT: 66 IN | SYSTOLIC BLOOD PRESSURE: 102 MMHG

## 2024-09-11 DIAGNOSIS — J01.11 ACUTE RECURRENT FRONTAL SINUSITIS: Primary | ICD-10-CM

## 2024-09-11 PROCEDURE — 99213 OFFICE O/P EST LOW 20 MIN: CPT | Performed by: FAMILY MEDICINE

## 2024-09-11 RX ORDER — AZITHROMYCIN 250 MG/1
TABLET, FILM COATED ORAL
Qty: 6 TABLET | Refills: 0 | Status: SHIPPED | OUTPATIENT
Start: 2024-09-11 | End: 2024-09-15

## 2024-09-11 NOTE — PROGRESS NOTES
Ambulatory Visit  Name: Madeleine Mccoy      : 2000      MRN: [id number]  Encounter Provider: Andrews Galvez DO  Encounter Date: 2024   Encounter department: [facility]    Assessment & Plan  Acute recurrent frontal sinusitis  -Notices congestion has worsened over the past few days.  Notes her mucus has changed to dark yellow, greenish color.  Has significant pressure behind both her eyes.  Does have history of recurrent sinus infections previously.  -Will treat with antibiotics as below.  -Continue over-the-counter decongestions, Flonase 1 spray nightly in each nostril, continue Zyrtec Zyrtec for allergic component.  -Discussed adequate hydration  -Follow-up as needed.  Orders:    azithromycin (ZITHROMAX) 250 mg tablet; Take 2 tablets today then 1 tablet daily x 4 days         History of Present Illness     Madeleine is a 24-year-old female who presents today for continued follow-up of her ongoing congestion.  She notes over the past few days has worsened.  She has significant sinus pressure behind both her eyes and her mucus has changed to a dark yellow or greenish color.  She denies any blood or mucus.  She denies any difficulty breathing.  No wheezing or shortness of breath.  Denies palpitations.  No fevers, chills.  No nausea, vomiting      Review of Systems   Constitutional:  Negative for chills and fever.   HENT:  Positive for congestion, sinus pressure and sinus pain. Negative for ear pain and sore throat.    Eyes:  Negative for pain and visual disturbance.   Respiratory:  Negative for cough, shortness of breath and wheezing.    Cardiovascular:  Negative for chest pain and palpitations.   Gastrointestinal:  Negative for abdominal pain and vomiting.   Genitourinary:  Negative for dysuria and hematuria.   Musculoskeletal:  Negative for arthralgias and back pain.   Skin:  Negative for color change and rash.   Neurological:  Negative for seizures and syncope.    Psychiatric/Behavioral:  Negative for confusion. The patient is not nervous/anxious.    All other systems reviewed and are negative.    Past Medical History:   Diagnosis Date    Endometriosis      Past Surgical History:   Procedure Laterality Date    LEG TENDON SURGERY      WISDOM TOOTH EXTRACTION       Family History   Problem Relation Age of Onset    No Known Problems Mother     No Known Problems Father     No Known Problems Brother     No Known Problems Maternal Grandmother     No Known Problems Maternal Grandfather     Diabetes Paternal Grandmother     Lung disease Paternal Grandmother      Social History     Tobacco Use    Smoking status: Never    Smokeless tobacco: Never   Vaping Use    Vaping status: Never Used   Substance and Sexual Activity    Alcohol use: Yes     Comment: social     Drug use: Not Currently     Types: Marijuana    Sexual activity: Yes     Partners: Male     Birth control/protection: None, OCP     Current Outpatient Medications on File Prior to Visit   Medication Sig    norgestrel-ethinyl estradiol (Low-Ogestrel) 0.3 mg-30 mcg per tablet Take 1 tablet by mouth daily     Allergies   Allergen Reactions    Apple - Food Allergy     Other Other (See Comments)     Per patient, itchy eyes, stuffy nose & sneezing.--Cats    Prunus Persica      Immunization History   Administered Date(s) Administered    COVID-19 PFIZER VACCINE 0.3 ML IM 02/19/2021, 04/19/2021, 05/10/2021    DTaP / HiB 2001    DTaP / HiB / IPV 2001, 2001    DTaP 5 06/27/2002, 05/09/2006    HPV9 06/08/2021, 06/08/2021, 07/27/2021, 07/27/2021, 12/20/2021    Hep A, adult 05/10/2007, 05/12/2008    Hep B, adult 2001, 01/02/2002, 06/27/2002    Hib (PRP-OMP) 03/27/2002    INFLUENZA 2001, 11/20/2010, 11/03/2014, 02/19/2016, 01/30/2018    IPV 06/27/2002, 05/09/2006    Influenza, seasonal, injectable 11/23/2013    MMR 03/27/2002, 04/25/2005    Meningococcal MCV4P 10/18/2017    Meningococcal, Unknown Serogroups  "05/14/2012    Pneumococcal Conjugate PCV 7 2001, 2001, 2001, 03/27/2002    Tdap 05/14/2012, 06/20/2022    Tuberculin Skin Test-PPD Intradermal 06/20/2022    Varicella 03/27/2002, 05/10/2007     Objective     /60 (BP Location: Left arm, Patient Position: Sitting, Cuff Size: Standard)   Pulse 93   Temp (!) 97.3 °F (36.3 °C) (Tympanic)   Resp 16   Ht 5' 5.9\" (1.674 m)   Wt 74.4 kg (164 lb)   SpO2 98%   BMI 26.55 kg/m²     Physical Exam  Vitals and nursing note reviewed.   Constitutional:       General: She is not in acute distress.     Appearance: Normal appearance.   HENT:      Head: Normocephalic and atraumatic.      Right Ear: Tympanic membrane and external ear normal.      Left Ear: Tympanic membrane and external ear normal.      Nose:      Right Sinus: Maxillary sinus tenderness and frontal sinus tenderness present.      Left Sinus: Maxillary sinus tenderness and frontal sinus tenderness present.      Mouth/Throat:      Mouth: Mucous membranes are moist.   Eyes:      Extraocular Movements: Extraocular movements intact.      Conjunctiva/sclera: Conjunctivae normal.      Pupils: Pupils are equal, round, and reactive to light.   Cardiovascular:      Rate and Rhythm: Normal rate and regular rhythm.      Pulses: Normal pulses.      Heart sounds: No murmur heard.  Pulmonary:      Effort: Pulmonary effort is normal.      Breath sounds: Normal breath sounds. No wheezing, rhonchi or rales.   Abdominal:      General: Bowel sounds are normal.      Palpations: Abdomen is soft.      Tenderness: There is no abdominal tenderness. There is no guarding.   Musculoskeletal:         General: Normal range of motion.      Cervical back: Normal range of motion.      Right lower leg: No edema.      Left lower leg: No edema.   Lymphadenopathy:      Cervical: No cervical adenopathy.   Skin:     General: Skin is warm.      Capillary Refill: Capillary refill takes less than 2 seconds.   Neurological:      " General: No focal deficit present.      Mental Status: She is alert and oriented to person, place, and time.   Psychiatric:         Mood and Affect: Mood normal.         Behavior: Behavior normal.

## 2024-09-12 LAB — BACTERIA THROAT CULT: ABNORMAL

## 2024-09-12 NOTE — RESULT ENCOUNTER NOTE
Blaise Salvador,    Your throat culture did come back positive for strep.  Please continue the antibiotic that we started as that should cover.  Please let us know if you not feeling better or any changes.    Thank you  -Dr. CHOW

## 2024-10-04 ENCOUNTER — TELEPHONE (OUTPATIENT)
Dept: OTHER | Facility: OTHER | Age: 24
End: 2024-10-04

## 2024-10-04 NOTE — TELEPHONE ENCOUNTER
Patient is calling to schedule an appointment because she has a bump on her vaginal area . Please follow up and schedule appointment. Thank you in advance .

## 2024-10-07 NOTE — TELEPHONE ENCOUNTER
Pt called after office hours, states she could not make appt on 10/8 due to work schedule. She rescheduled for 10/17. Please follow-up with pt for sooner available appt if possible.

## 2024-10-09 PROBLEM — K52.9 GASTROENTERITIS: Status: RESOLVED | Noted: 2024-09-09 | Resolved: 2024-10-09

## 2024-10-30 ENCOUNTER — OFFICE VISIT (OUTPATIENT)
Dept: FAMILY MEDICINE CLINIC | Facility: CLINIC | Age: 24
End: 2024-10-30
Payer: COMMERCIAL

## 2024-10-30 VITALS
TEMPERATURE: 97.3 F | WEIGHT: 161.6 LBS | RESPIRATION RATE: 16 BRPM | HEART RATE: 121 BPM | HEIGHT: 65 IN | OXYGEN SATURATION: 98 % | SYSTOLIC BLOOD PRESSURE: 112 MMHG | DIASTOLIC BLOOD PRESSURE: 64 MMHG | BODY MASS INDEX: 26.92 KG/M2

## 2024-10-30 DIAGNOSIS — J02.0 STREP THROAT: Primary | ICD-10-CM

## 2024-10-30 DIAGNOSIS — J02.9 SORE THROAT: ICD-10-CM

## 2024-10-30 LAB — S PYO AG THROAT QL: POSITIVE

## 2024-10-30 PROCEDURE — 99213 OFFICE O/P EST LOW 20 MIN: CPT | Performed by: NURSE PRACTITIONER

## 2024-10-30 PROCEDURE — 87880 STREP A ASSAY W/OPTIC: CPT | Performed by: NURSE PRACTITIONER

## 2024-10-30 RX ORDER — CEFIXIME 400 MG/1
400 CAPSULE ORAL DAILY
Qty: 10 CAPSULE | Refills: 0 | Status: SHIPPED | OUTPATIENT
Start: 2024-10-30 | End: 2024-11-09

## 2024-10-30 NOTE — PROGRESS NOTES
Ambulatory Visit  Name: Madeleine Mccoy      : 2000      MRN: [id number]  Encounter Provider: FELICIA Christie  Encounter Date: 10/30/2024   Encounter department: [facility]    Assessment & Plan  Strep throat  Positive rapid GAS test in office, had strep A last in March, has had non-group A strep infections multiple times this year as well.  Will treat with cefixime x 10 day.  She will follow up with her ent to revisit the option for tonsillectomy due to the history of recurrent strep a infections.  Pt instructed to call for reevaluation if sx worsen or persist.    Orders:    cefixime (SUPRAX) 400 mg; Take 1 capsule (400 mg total) by mouth daily for 10 days       History of Present Illness     Pt is a 24 y.o. female who is seen today for evaluation of sore throat.  She started to fee sick yesterday.  When she woke up yesterday she had slightly red left tonsil but now both are swollen and white.  She states she had a slight fever yesterday morning, 100.5.  she feels fatigued and has slight body aches.  Her left ear was bothering her yesterday but does not today, denies cough, congestion, headache.  Appetite is decreased but she is still eating.  Denies n/v/d abdominal pain.  She is using lozenges and a throat spray, she took advil when she had a fever.          Review of Systems   Constitutional:  Positive for appetite change, fatigue and fever. Negative for chills.   HENT:  Positive for sore throat. Negative for congestion, ear pain, postnasal drip and sinus pressure.    Respiratory:  Negative for cough and shortness of breath.    Cardiovascular:  Negative for chest pain and palpitations.   Gastrointestinal:  Negative for diarrhea, nausea and vomiting.   Musculoskeletal:  Positive for myalgias.   Neurological:  Positive for headaches. Negative for dizziness.   Hematological:  Negative for adenopathy.   Psychiatric/Behavioral:  Negative for sleep disturbance.   "          Objective     /64 (BP Location: Left arm, Patient Position: Sitting, Cuff Size: Standard)   Pulse (!) 121   Temp (!) 97.3 °F (36.3 °C) (Tympanic)   Resp 16   Ht 5' 5\" (1.651 m)   Wt 73.3 kg (161 lb 9.6 oz)   SpO2 98%   BMI 26.89 kg/m²     Physical Exam  Vitals reviewed.   Constitutional:       General: She is not in acute distress.     Appearance: Normal appearance. She is well-developed.   HENT:      Right Ear: Hearing, tympanic membrane, ear canal and external ear normal. No middle ear effusion. Tympanic membrane is not bulging.      Left Ear: Hearing, tympanic membrane, ear canal and external ear normal.  No middle ear effusion. Tympanic membrane is not bulging.      Nose: No mucosal edema or rhinorrhea.      Mouth/Throat:      Lips: Pink.      Mouth: Mucous membranes are moist. Mucous membranes are not dry.      Pharynx: Oropharyngeal exudate (white patches on B tonsils) present. No posterior oropharyngeal erythema.      Tonsils: No tonsillar abscesses. 2+ on the right. 2+ on the left.   Eyes:      Conjunctiva/sclera: Conjunctivae normal.   Cardiovascular:      Rate and Rhythm: Regular rhythm. Tachycardia present.      Heart sounds: Normal heart sounds. No murmur heard.  Pulmonary:      Effort: Pulmonary effort is normal. No accessory muscle usage or respiratory distress.      Breath sounds: Normal breath sounds. No decreased breath sounds, wheezing, rhonchi or rales.   Lymphadenopathy:      Head:      Right side of head: No submental, submandibular, tonsillar, preauricular, posterior auricular or occipital adenopathy.      Left side of head: No submental, submandibular, tonsillar, preauricular, posterior auricular or occipital adenopathy.      Cervical: No cervical adenopathy.   Skin:     General: Skin is warm and dry.   Neurological:      Mental Status: She is alert and oriented to person, place, and time.   Psychiatric:         Attention and Perception: Attention normal.         Mood " and Affect: Mood normal.         Speech: Speech normal.         Behavior: Behavior normal. Behavior is cooperative.         Thought Content: Thought content normal.         Cognition and Memory: Cognition normal.         Judgment: Judgment normal.

## 2024-12-16 DIAGNOSIS — Z30.011 ENCOUNTER FOR INITIAL PRESCRIPTION OF CONTRACEPTIVE PILLS: ICD-10-CM

## 2024-12-17 RX ORDER — NORGESTREL AND ETHINYL ESTRADIOL 0.3-0.03MG
1 KIT ORAL DAILY
Qty: 84 TABLET | Refills: 0 | Status: SHIPPED | OUTPATIENT
Start: 2024-12-17